# Patient Record
Sex: MALE | Race: WHITE | NOT HISPANIC OR LATINO | Employment: OTHER | URBAN - METROPOLITAN AREA
[De-identification: names, ages, dates, MRNs, and addresses within clinical notes are randomized per-mention and may not be internally consistent; named-entity substitution may affect disease eponyms.]

---

## 2019-09-09 ENCOUNTER — NURSING HOME VISIT (OUTPATIENT)
Dept: GERIATRICS | Facility: OTHER | Age: 84
End: 2019-09-09
Payer: MEDICARE

## 2019-09-09 DIAGNOSIS — G47.00 INSOMNIA, UNSPECIFIED TYPE: ICD-10-CM

## 2019-09-09 DIAGNOSIS — F41.9 ANXIETY: ICD-10-CM

## 2019-09-09 DIAGNOSIS — F01.50 VASCULAR DEMENTIA WITHOUT BEHAVIORAL DISTURBANCE (HCC): Primary | ICD-10-CM

## 2019-09-09 DIAGNOSIS — R26.2 AMBULATORY DYSFUNCTION: ICD-10-CM

## 2019-09-09 DIAGNOSIS — F32.89 OTHER DEPRESSION: ICD-10-CM

## 2019-09-09 PROBLEM — F03.90 DEMENTIA (HCC): Status: ACTIVE | Noted: 2019-09-09

## 2019-09-09 PROBLEM — F32.A DEPRESSION: Status: ACTIVE | Noted: 2019-09-09

## 2019-09-09 PROCEDURE — 99336 PR DOM/R-HOME E/M EST PT MOD HI SEVERITY 40 MINUTES: CPT | Performed by: NURSE PRACTITIONER

## 2019-09-09 NOTE — PROGRESS NOTES
[facility]  [address]  [phone]  [place]   POS: 31: SNF/Short Term Rehab        NAME: Vivienne Marquez  AGE: 80 y o  SEX: male    DATE OF ENCOUNTER: 9/9/2019    Assessment and Plan     Dementia  Continue supportive care, redirection, reorientation, assist with ADLs as needed, memantine  Depression  Stable  Continue Wellbutrin, BuSpar, citalopram     Anxiety  Continue Ativan  Ambulatory dysfunction  Fall precautions  Insomnia  Continue melatonin  Chief Complaint      Progress Note    History of Present Illness     Resident seen examined  Stable  Denies chest pain, shortness of breath, abdominal pain, nausea, vomiting, constipation, diarrhea, headache, dizziness, pain  He ambulates without assistance  Staff denies any recent falls  Staff denies any complaints  He is unable to provide history due to dementia  He participates in activities  Medications reviewed  Review of Systems     ROS as per noted in HPI  Objective     Vitals:  Blood pressure 111/62, pulse 65, respirations 18, temperature 97 7°  Physical Exam   Constitutional: He appears well-developed and well-nourished  Neck: Neck supple  Cardiovascular: Normal rate and normal heart sounds  Exam reveals no gallop and no friction rub  No murmur heard  Pulmonary/Chest: Effort normal and breath sounds normal  He has no wheezes  He has no rales  Abdominal: Soft  Bowel sounds are normal  There is no tenderness  There is no rebound and no guarding  Musculoskeletal: Normal range of motion  He exhibits no edema  Neurological: He is alert  He is oriented to self and to date  He needed a hint for name of place  Skin: Skin is warm and dry  Nursing note and vitals reviewed  Current Medications   Medications were reviewed and updated in facility chart       PREETI Mcconnell  9/9/2019 12:36 PM

## 2019-10-11 ENCOUNTER — TELEPHONE (OUTPATIENT)
Dept: OTHER | Facility: OTHER | Age: 84
End: 2019-10-11

## 2019-10-12 NOTE — TELEPHONE ENCOUNTER
Sent this Message to Dr Madie Horta via [address] [place] [address] Rd connect @ 8519    [phone]/ Diaz Gold from [address]/ Pt   Soledad Fry  / Medication needed, drop Shipment    Told Tiny to call back in 20-30 minutes if no call back from Provider

## 2019-11-10 ENCOUNTER — TELEPHONE (OUTPATIENT)
Dept: OTHER | Facility: OTHER | Age: 84
End: 2019-11-10

## 2019-11-14 ENCOUNTER — NURSING HOME VISIT (OUTPATIENT)
Dept: GERIATRICS | Facility: OTHER | Age: 84
End: 2019-11-14
Payer: MEDICARE

## 2019-11-14 DIAGNOSIS — F01.50 VASCULAR DEMENTIA WITHOUT BEHAVIORAL DISTURBANCE (HCC): Primary | ICD-10-CM

## 2019-11-14 DIAGNOSIS — F41.9 ANXIETY: ICD-10-CM

## 2019-11-14 DIAGNOSIS — F32.89 OTHER DEPRESSION: ICD-10-CM

## 2019-11-14 DIAGNOSIS — G47.00 INSOMNIA, UNSPECIFIED TYPE: ICD-10-CM

## 2019-11-14 DIAGNOSIS — R26.2 AMBULATORY DYSFUNCTION: ICD-10-CM

## 2019-11-14 PROCEDURE — 99336 PR DOM/R-HOME E/M EST PT MOD HI SEVERITY 40 MINUTES: CPT | Performed by: FAMILY MEDICINE

## 2019-11-15 NOTE — PROGRESS NOTES
[facility]  [address]  [phone]  [facility] SNF List: [facility]      NAME: Reji Mendosa  AGE: 80 y o  SEX: male [id number]    DATE OF ENCOUNTER: 11/18/2019    Assessment and Plan   1  Dementia, moderate to severe, vascular type: no behaviors noted     - redirection, reorientation, assistance with ADLs  - tolerating Memantine well     - cont Buspirone and Bupropion  2  Amb dysfunction: fall precautions  3  Depression: stable on Lexapro  4  Anxiety: stable on Lorazepam PRN and Buspirone  5  Insomnia: cont Melatonin     - sleep hygiene discussed with staff  Disposition: he is a long term resident at [address] [place] [address] dementia [facility]  - Counseling Documentation: patient was counseled regarding: prognosis    Chief Complaint     Resident seen during routine follow up visit  History of Present Illness     HPI   Resident seen and examined at bedside  Stable, he can't give much history due to dementia  He walks around mostly, does not use assistive devices  Staff has no complaints at this time  He has no falls or hospitalizations  He need assistance with ADLs, able to feed himself  He likes to participate in activities  He denies any pain  The following portions of the patient's history were reviewed and updated as appropriate: allergies, current medications, past family history, past medical history, past social history, past surgical history and problem list     Review of Systems     Review of Systems   Unable to perform ROS: Dementia   as in HPI  Active Problem List     Patient Active Problem List   Diagnosis    Dementia ([facility] [address] )    Depression    Anxiety    Ambulatory dysfunction    Insomnia       Objective     Vitals: T: 97 9, P: 76, R: 16, BP: 120/74, 94% on RA  Physical Exam   Constitutional: He appears well-developed and well-nourished  No distress  HENT:   Head: Normocephalic  Mouth/Throat: Oropharynx is clear and moist  No oropharyngeal exudate  Eyes: Conjunctivae and EOM are normal  Right eye exhibits no discharge  Left eye exhibits no discharge  No scleral icterus  Neck: Normal range of motion  Neck supple  Cardiovascular: Normal rate, regular rhythm and normal heart sounds  Exam reveals no gallop and no friction rub  No murmur heard  Pulmonary/Chest: Effort normal and breath sounds normal  No respiratory distress  He has no wheezes  He exhibits no tenderness  Abdominal: Soft  Bowel sounds are normal  He exhibits no distension  There is no tenderness  Musculoskeletal: Normal range of motion  He exhibits no edema, tenderness or deformity  Neurological: He is alert  No cranial nerve deficit or sensory deficit  Oriented to self  Skin: Skin is warm and dry  He is not diaphoretic  Psychiatric: He has a normal mood and affect  Confused, minimally verbal     Nursing note and vitals reviewed  Pertinent Laboratory/Diagnostic Studies:  He had CBC, BMP, TSH done on 10/11/19, within normal limits  Current Medications   Medications reviewed and updated in facility chart  DOS: 11/18/2019  Facility: [facility] SNF List: [facility]  BILLING CODE: [id number]  Nursing Home Place of Service: nursing home place of service: POS 32 Unskilled- No Part A Coverage  Diagnoses:   Diagnosis ICD-10-CM Associated Orders   1  Vascular dementia without behavioral disturbance (HCC) F01 50    2  Other depression F32 89    3  Insomnia, unspecified type G47 00    4  Anxiety F41 9    5   Ambulatory dysfunction R26 2

## 2020-01-09 ENCOUNTER — NURSING HOME VISIT (OUTPATIENT)
Dept: GERIATRICS | Facility: OTHER | Age: 85
End: 2020-01-09
Payer: MEDICARE

## 2020-01-09 DIAGNOSIS — F01.50 VASCULAR DEMENTIA WITHOUT BEHAVIORAL DISTURBANCE (HCC): Primary | ICD-10-CM

## 2020-01-09 DIAGNOSIS — F32.89 OTHER DEPRESSION: ICD-10-CM

## 2020-01-09 DIAGNOSIS — F41.9 ANXIETY: ICD-10-CM

## 2020-01-09 PROCEDURE — 99335 PR DOM/R-HOME E/M EST PT LW MOD SEVERITY 25 MINUTES: CPT | Performed by: PHYSICIAN ASSISTANT

## 2020-01-09 RX ORDER — BUPROPION HYDROCHLORIDE 100 MG/1
100 TABLET ORAL 2 TIMES DAILY
COMMUNITY
End: 2020-10-08 | Stop reason: SDUPTHER

## 2020-01-09 RX ORDER — LORAZEPAM 0.5 MG/1
0.5 TABLET ORAL 2 TIMES DAILY
COMMUNITY
End: 2020-03-27 | Stop reason: SDUPTHER

## 2020-01-09 RX ORDER — BUSPIRONE HYDROCHLORIDE 10 MG/1
10 TABLET ORAL 3 TIMES DAILY
COMMUNITY
End: 2020-10-08 | Stop reason: SDUPTHER

## 2020-01-09 RX ORDER — ESCITALOPRAM OXALATE 20 MG/1
20 TABLET ORAL DAILY
COMMUNITY
End: 2020-10-08 | Stop reason: SDUPTHER

## 2020-01-09 RX ORDER — MEMANTINE HYDROCHLORIDE 10 MG/1
10 TABLET ORAL 2 TIMES DAILY
COMMUNITY
End: 2020-07-23 | Stop reason: ALTCHOICE

## 2020-01-09 NOTE — PROGRESS NOTES
[facility]  [address]  [phone]  [facility]   Code 13        NAME: Linnette Mora  AGE: 80 y o  SEX: male    DATE OF ENCOUNTER: 1/9/2020    Assessment and Plan     Dementia  Continue redirection and reorientation  Continue to assist in ADLs at facility  Continue memantine 10mg PO BID  Depression  Continue buproprion 100mg PO QD  Continue lexapro 20mg PO QD  Anxiety  Continue lorazepam 0 5mg BID and 0 5mg Q24H prn for anxiety  All medications and routine orders were reviewed and updated as needed  Chief Complaint     long term Follow-up     History of Present Illness     MP is an 79 yo CM with multiple medical comorbidities including but not limited to vascular dementia, anxiety, and depression, interviewed and examined in collaboration with nursing for JARRETT follow-up  Unable to obtain history from pt due to dementia  No concerns from nursing  The patient's allergies, past medical, surgical, social and family history were reviewed and unchanged  Review of Systems     Review of Systems   Unable to perform ROS: Dementia         Objective     Vitals: 98 [id number]    Physical Exam   Constitutional: No distress  HENT:   Head: Normocephalic and atraumatic  Nose: Nose normal    Mouth/Throat: Oropharynx is clear and moist  No oropharyngeal exudate  Eyes: Pupils are equal, round, and reactive to light  Conjunctivae are normal  Right eye exhibits no discharge  Left eye exhibits no discharge  No scleral icterus  Cardiovascular: Normal rate and regular rhythm  Exam reveals no gallop and no friction rub  No murmur heard  Pulmonary/Chest: Effort normal and breath sounds normal  No stridor  No respiratory distress  He has no wheezes  He has no rales  Abdominal: Soft  Bowel sounds are normal  He exhibits no distension  There is no tenderness  There is no rebound and no guarding  Musculoskeletal: He exhibits no edema or tenderness     5/5 strength BUE and BLE    FROM BUE, BLE    Able to ambulate without assistive device  Neurological: He is alert  Confused, often referring to his being here to visit a friend's mother  Skin: He is not diaphoretic  Psychiatric:   Pleasant and cooperative during exam  Able to follow one step commands  Nursing note and vitals reviewed  Pertinent Laboratory/Diagnostic Studies:  Labs:  CMP, TSH, CBC 10/11/19 reviewed in facility chart     Current Medications   Medications reviewed and updated see facility chart for details        Current Outpatient Medications:     buPROPion (WELLBUTRIN) 100 mg tablet, Take 100 mg by mouth daily, Disp: , Rfl:     busPIRone (BUSPAR) 10 mg tablet, Take 10 mg by mouth 3 (three) times a day, Disp: , Rfl:     escitalopram (LEXAPRO) 20 mg tablet, Take 20 mg by mouth daily, Disp: , Rfl:     LORazepam (ATIVAN) 0 5 mg tablet, Take 0 5 mg by mouth 2 (two) times a day Take 1 tab PO BID AND 1 tab Q24H prn for anxiety, Disp: , Rfl:     memantine (NAMENDA) 10 mg tablet, Take 10 mg by mouth 2 (two) times a day, Disp: , Rfl:       Zack Olivera PA-C  1/9/2020 11:29 AM

## 2020-01-09 NOTE — ASSESSMENT & PLAN NOTE
Continue redirection and reorientation  Continue to assist in ADLs at facility  Continue memantine 10mg PO BID

## 2020-03-12 ENCOUNTER — NURSING HOME VISIT (OUTPATIENT)
Dept: GERIATRICS | Facility: OTHER | Age: 85
End: 2020-03-12
Payer: MEDICARE

## 2020-03-12 DIAGNOSIS — G47.00 INSOMNIA, UNSPECIFIED TYPE: ICD-10-CM

## 2020-03-12 DIAGNOSIS — F41.9 ANXIETY: ICD-10-CM

## 2020-03-12 DIAGNOSIS — F01.50 VASCULAR DEMENTIA WITHOUT BEHAVIORAL DISTURBANCE (HCC): Primary | ICD-10-CM

## 2020-03-12 DIAGNOSIS — R26.2 AMBULATORY DYSFUNCTION: ICD-10-CM

## 2020-03-12 DIAGNOSIS — F32.89 OTHER DEPRESSION: ICD-10-CM

## 2020-03-12 PROCEDURE — 99336 PR DOM/R-HOME E/M EST PT MOD HI SEVERITY 40 MINUTES: CPT | Performed by: FAMILY MEDICINE

## 2020-03-27 DIAGNOSIS — F41.9 ANXIETY: Primary | ICD-10-CM

## 2020-03-27 RX ORDER — LORAZEPAM 0.5 MG/1
0.5 TABLET ORAL 2 TIMES DAILY
Qty: 60 TABLET | Refills: 0 | Status: SHIPPED | OUTPATIENT
Start: 2020-03-27 | End: 2020-04-13 | Stop reason: SDUPTHER

## 2020-04-13 DIAGNOSIS — F41.9 ANXIETY: ICD-10-CM

## 2020-04-13 RX ORDER — LORAZEPAM 1 MG/1
1 TABLET ORAL 2 TIMES DAILY
Qty: 60 TABLET | Refills: 0 | Status: SHIPPED | OUTPATIENT
Start: 2020-04-13 | End: 2020-04-30 | Stop reason: ALTCHOICE

## 2020-04-30 DIAGNOSIS — F41.9 ANXIETY: Primary | ICD-10-CM

## 2020-04-30 RX ORDER — LORAZEPAM 0.5 MG/1
0.5 TABLET ORAL 2 TIMES DAILY
Qty: 60 TABLET | Refills: 0 | Status: SHIPPED | OUTPATIENT
Start: 2020-04-30 | End: 2020-06-29 | Stop reason: SDUPTHER

## 2020-05-21 ENCOUNTER — TELEMEDICINE (OUTPATIENT)
Dept: GERIATRICS | Facility: OTHER | Age: 85
End: 2020-05-21
Payer: MEDICARE

## 2020-05-21 DIAGNOSIS — R26.2 AMBULATORY DYSFUNCTION: ICD-10-CM

## 2020-05-21 DIAGNOSIS — F41.9 ANXIETY: ICD-10-CM

## 2020-05-21 DIAGNOSIS — G47.00 INSOMNIA, UNSPECIFIED TYPE: ICD-10-CM

## 2020-05-21 DIAGNOSIS — F32.89 OTHER DEPRESSION: ICD-10-CM

## 2020-05-21 DIAGNOSIS — F01.50 VASCULAR DEMENTIA WITHOUT BEHAVIORAL DISTURBANCE (HCC): Primary | ICD-10-CM

## 2020-05-21 PROCEDURE — 99442 PR PHYS/QHP TELEPHONE EVALUATION 11-20 MIN: CPT | Performed by: FAMILY MEDICINE

## 2020-06-29 DIAGNOSIS — F41.9 ANXIETY: ICD-10-CM

## 2020-06-29 RX ORDER — LORAZEPAM 0.5 MG/1
0.5 TABLET ORAL 2 TIMES DAILY
Qty: 60 TABLET | Refills: 0 | Status: SHIPPED | OUTPATIENT
Start: 2020-06-29 | End: 2020-10-29 | Stop reason: SDUPTHER

## 2020-07-23 ENCOUNTER — TELEMEDICINE (OUTPATIENT)
Dept: GERIATRICS | Facility: OTHER | Age: 85
End: 2020-07-23
Payer: MEDICARE

## 2020-07-23 VITALS
OXYGEN SATURATION: 97 % | SYSTOLIC BLOOD PRESSURE: 114 MMHG | TEMPERATURE: 98.9 F | RESPIRATION RATE: 20 BRPM | DIASTOLIC BLOOD PRESSURE: 71 MMHG | HEART RATE: 81 BPM

## 2020-07-23 DIAGNOSIS — F01.50 VASCULAR DEMENTIA WITHOUT BEHAVIORAL DISTURBANCE (HCC): Primary | ICD-10-CM

## 2020-07-23 DIAGNOSIS — F32.89 OTHER DEPRESSION: ICD-10-CM

## 2020-07-23 DIAGNOSIS — F41.9 ANXIETY: ICD-10-CM

## 2020-07-23 PROCEDURE — 99446 NTRPROF PH1/NTRNET/EHR 5-10: CPT | Performed by: PHYSICIAN ASSISTANT

## 2020-07-23 NOTE — PROGRESS NOTES
Virtual Regular Visit      Assessment/Plan:    Problem List Items Addressed This Visit        Nervous and Auditory    Dementia ([facility] [address] ) - Primary     - Continue redirection and reorientation   - continue to participate in engaging, group activities  - Continue to assist with ADLs at facility   - Continue fall precautions              Other    Depression     - Continue Wellbutrin 100mg PO BID  - Continue lexapro 20 mg PO QD  - Denies SI/HI         Anxiety     - Continue lorazepam 0 5 mg PO BID   - Continue Buspar 10 mg PO TID                     Reason for visit is   Chief Complaint   Patient presents with    Virtual Regular Visit        Encounter provider Jr Murphy PA-C    Provider located at [address] [place] [address] [address] [facility]   [phone] [address]   Code 13     Recent Visits  No visits were found meeting these conditions  Showing recent visits within past 7 days and meeting all other requirements     Today's Visits  Date Type Provider Dept   07/23/20 Telemedicine Jr Murphy PA-C  Nursing Home Vir   Showing today's visits and meeting all other requirements     Future Appointments  No visits were found meeting these conditions  Showing future appointments within next 150 days and meeting all other requirements        The patient was identified by name and date of birth  Lia Shay and nurse were informed that this is a telemedicine visit and that the visit is being conducted through [address] [place] [address] and patient was informed that this is not a secure, HIPAA-complaint platform  They agrees to proceed     My office door was closed  No one else was in the room  They acknowledged consent and understanding of privacy and security of the video platform  The patient has agreed to participate and understands they can discontinue the visit at any time  Patient is aware this is a billable service  Subjective  Lia Shay is a 80 y o  male with multiple medical comorbidities including but not limited to vascular dementia, depression, and anxiety, interviewed and examined in collaboration with nursing for care home follow-up  He is unable to provide history due to dementia  Nursing without concerns at this time  He has had no recent falls  No recent hospitalizations  Past Medical History:   Diagnosis Date    Insomnia 9/9/2019       History reviewed  No pertinent surgical history  Current Outpatient Medications   Medication Sig Dispense Refill    buPROPion (WELLBUTRIN) 100 mg tablet Take 100 mg by mouth 2 (two) times a day      busPIRone (BUSPAR) 10 mg tablet Take 10 mg by mouth 3 (three) times a day      escitalopram (LEXAPRO) 20 mg tablet Take 20 mg by mouth daily      LORazepam (ATIVAN) 0 5 mg tablet Take 1 tablet (0 5 mg total) by mouth 2 (two) times a day 60 tablet 0     No current facility-administered medications for this visit  Allergies   Allergen Reactions    Bee Venom        Review of Systems   Unable to perform ROS: Dementia       Video Exam    Vitals:    07/23/20 1602   BP: 114/71   Pulse: 81   Resp: 20   Temp: 98 9 °F (37 2 °C)   SpO2: 97%       Physical Exam   Constitutional: No distress  Chronically ill appearing elderly male lying comfortably in bed    HENT:   Head: Normocephalic and atraumatic  Pulmonary/Chest: Effort normal    Neurological: He is alert  Oriented to self only    Skin: He is not diaphoretic  Psychiatric:   Pleasant, unable to fully cooperate with exam due to dementia   Nursing note and vitals reviewed  I spent 10 minutes directly with the patient during this visit      VIRTUAL VISIT DISCLAIMER    Patience Kim and nurse acknowledge that he has consented to an online visit or consultation   They understands that the online visit is based solely on information provided by him, and that, in the absence of a face-to-face physical evaluation by the physician, the diagnosis he receives is both limited and provisional in terms of accuracy and completeness  This is not intended to replace a full medical face-to-face evaluation by the physician  Cody Webber and nurse understand and accept these terms

## 2020-07-23 NOTE — ASSESSMENT & PLAN NOTE
- Continue redirection and reorientation   - continue to participate in engaging, group activities  - Continue to assist with ADLs at facility   - Continue fall precautions

## 2020-09-17 ENCOUNTER — NURSING HOME VISIT (OUTPATIENT)
Dept: GERIATRICS | Facility: OTHER | Age: 85
End: 2020-09-17
Payer: MEDICARE

## 2020-09-17 DIAGNOSIS — F41.9 ANXIETY: ICD-10-CM

## 2020-09-17 DIAGNOSIS — G47.00 INSOMNIA, UNSPECIFIED TYPE: ICD-10-CM

## 2020-09-17 DIAGNOSIS — F01.50 VASCULAR DEMENTIA WITHOUT BEHAVIORAL DISTURBANCE (HCC): Primary | ICD-10-CM

## 2020-09-17 DIAGNOSIS — F32.89 OTHER DEPRESSION: ICD-10-CM

## 2020-09-17 DIAGNOSIS — R26.2 AMBULATORY DYSFUNCTION: ICD-10-CM

## 2020-09-17 PROCEDURE — 99336 PR DOM/R-HOME E/M EST PT MOD HI SEVERITY 40 MINUTES: CPT | Performed by: FAMILY MEDICINE

## 2020-09-17 NOTE — PROGRESS NOTES
[facility]  [address]  [phone]  [facility]  Long term      NAME: Joseph Leon  AGE: 80 y o  SEX: male [id number]    DATE OF ENCOUNTER: 9/18/2020    Assessment and Plan     1  Vascular dementia without behavioral disturbance (HCC)     - redirection, reorientation     - assistance with ADLs    2  Other depression     - stable     - cont Lexapro 20 mg po qd     - cont Bupropion 100 mg po bid    3  Insomnia, unspecified type     - improving     - cont Melatonin 5 mg po qhs    4  Anxiety     - stable     - cont Buspar 10 mg po TID     - cont Lorazepam 0 5 mg po bid    5  Ambulatory dysfunction/fall      - Right rib x ray ordered     - start Tylenol 975 mg po TIDx 5 days , then PRN     - start Aspercreme topical to right ribs BID      - Counseling Documentation: patient was counseled regarding: prognosis    Chief Complaint     Routine long term follow up visit  History of Present Illness     Joseph Leon, a 81 y/o male seen and evaluated during routine follow up visit  He fell yesterday, c/o right sided lower rib pain, shooting pain, worsens with movement and breathing  He walks independent, no assistive devices used  Staff has no concerns at this time  He needs remainders and minimal assistance with ADLs  The following portions of the patient's history were reviewed and updated as appropriate: allergies, current medications, past family history, past medical history, past social history, past surgical history and problem list     Review of Systems     Review of Systems   Constitutional: Positive for activity change and fatigue  HENT: Positive for hearing loss  Negative for trouble swallowing  Eyes: Negative  Respiratory: Negative  Cardiovascular: Negative  Gastrointestinal: Negative  Genitourinary: Negative  Musculoskeletal: Positive for arthralgias, gait problem and myalgias  Neurological: Positive for weakness     Psychiatric/Behavioral: Negative  All other systems reviewed and are negative  As in HPI  Active Problem List     Patient Active Problem List   Diagnosis    Dementia ([facility] [address] )    Depression    Anxiety    Ambulatory dysfunction    Insomnia       Objective     Vitals: T; 97 8, P: 70, R: 16, BP: 142/84, 94% on RA, Wt: 154 2 lbs    Physical Exam  Vitals signs and nursing note reviewed  Constitutional:       General: He is not in acute distress  Appearance: Normal appearance  He is well-developed  He is not diaphoretic  HENT:      Head: Normocephalic and atraumatic  Nose: Nose normal       Mouth/Throat:      Mouth: Mucous membranes are moist       Pharynx: Oropharynx is clear  No oropharyngeal exudate  Eyes:      General: No scleral icterus  Right eye: No discharge  Left eye: No discharge  Extraocular Movements: Extraocular movements intact  Conjunctiva/sclera: Conjunctivae normal    Neck:      Musculoskeletal: Normal range of motion and neck supple  Cardiovascular:      Rate and Rhythm: Normal rate and regular rhythm  Heart sounds: Normal heart sounds  No murmur  No friction rub  No gallop  Pulmonary:      Effort: Pulmonary effort is normal  No respiratory distress  Breath sounds: Normal breath sounds  No wheezing  Chest:      Chest wall: No tenderness  Abdominal:      General: Bowel sounds are normal  There is no distension  Palpations: Abdomen is soft  Tenderness: There is no abdominal tenderness  There is no guarding  Musculoskeletal: Normal range of motion  General: Tenderness present  No deformity  Right lower leg: No edema  Left lower leg: No edema  Comments: Tenderness over right lower rib cage  Skin:     General: Skin is warm and dry  Neurological:      General: No focal deficit present  Mental Status: He is alert and oriented to person, place, and time  Mental status is at baseline        Cranial Nerves: No cranial nerve deficit  Motor: No abnormal muscle tone  Coordination: Coordination normal    Psychiatric:         Mood and Affect: Mood normal          Behavior: Behavior normal          Pertinent Laboratory/Diagnostic Studies:  Refer to facility chart  Current Medications   Medications reviewed and updated in facility chart

## 2020-09-18 ENCOUNTER — TELEPHONE (OUTPATIENT)
Dept: OTHER | Facility: OTHER | Age: 85
End: 2020-09-18

## 2020-09-18 NOTE — TELEPHONE ENCOUNTER
[facility] Pt: Luis Enrique Ruffin : 10/8/1930 Msg: Nasreen Timi results they want to go over     On Call Provider Paged

## 2020-10-08 DIAGNOSIS — F32.89 OTHER DEPRESSION: ICD-10-CM

## 2020-10-08 DIAGNOSIS — F41.9 ANXIETY: Primary | ICD-10-CM

## 2020-10-08 RX ORDER — BUSPIRONE HYDROCHLORIDE 10 MG/1
10 TABLET ORAL 3 TIMES DAILY
Qty: 270 TABLET | Refills: 0 | Status: SHIPPED | OUTPATIENT
Start: 2020-10-08 | End: 2020-12-23 | Stop reason: SDUPTHER

## 2020-10-08 RX ORDER — ESCITALOPRAM OXALATE 20 MG/1
20 TABLET ORAL DAILY
Qty: 90 TABLET | Refills: 0 | Status: SHIPPED | OUTPATIENT
Start: 2020-10-08 | End: 2020-12-23 | Stop reason: SDUPTHER

## 2020-10-08 RX ORDER — BUPROPION HYDROCHLORIDE 100 MG/1
100 TABLET ORAL 2 TIMES DAILY
Qty: 180 TABLET | Refills: 0 | Status: SHIPPED | OUTPATIENT
Start: 2020-10-08 | End: 2020-12-23 | Stop reason: SDUPTHER

## 2020-10-29 ENCOUNTER — TELEMEDICINE (OUTPATIENT)
Dept: GERIATRICS | Facility: OTHER | Age: 85
End: 2020-10-29
Payer: MEDICARE

## 2020-10-29 VITALS
TEMPERATURE: 97.4 F | RESPIRATION RATE: 20 BRPM | OXYGEN SATURATION: 99 % | DIASTOLIC BLOOD PRESSURE: 74 MMHG | SYSTOLIC BLOOD PRESSURE: 127 MMHG | WEIGHT: 154 LBS | HEART RATE: 76 BPM

## 2020-10-29 DIAGNOSIS — F41.9 ANXIETY: ICD-10-CM

## 2020-10-29 DIAGNOSIS — F01.50 VASCULAR DEMENTIA WITHOUT BEHAVIORAL DISTURBANCE (HCC): Primary | ICD-10-CM

## 2020-10-29 DIAGNOSIS — G47.00 INSOMNIA, UNSPECIFIED TYPE: ICD-10-CM

## 2020-10-29 DIAGNOSIS — F32.89 OTHER DEPRESSION: ICD-10-CM

## 2020-10-29 PROCEDURE — 99335 PR DOM/R-HOME E/M EST PT LW MOD SEVERITY 25 MINUTES: CPT | Performed by: PHYSICIAN ASSISTANT

## 2020-10-29 RX ORDER — LORAZEPAM 0.5 MG/1
0.5 TABLET ORAL 2 TIMES DAILY
Qty: 60 TABLET | Refills: 0 | Status: SHIPPED | OUTPATIENT
Start: 2020-10-29 | End: 2020-10-30 | Stop reason: SDUPTHER

## 2020-10-30 DIAGNOSIS — F41.9 ANXIETY: ICD-10-CM

## 2020-10-30 RX ORDER — LORAZEPAM 0.5 MG/1
0.5 TABLET ORAL 2 TIMES DAILY
Qty: 10 TABLET | Refills: 0 | Status: SHIPPED | OUTPATIENT
Start: 2020-10-30 | End: 2020-12-02 | Stop reason: SDUPTHER

## 2020-12-02 DIAGNOSIS — F41.9 ANXIETY: ICD-10-CM

## 2020-12-02 RX ORDER — LORAZEPAM 0.5 MG/1
0.5 TABLET ORAL 2 TIMES DAILY
Qty: 60 TABLET | Refills: 0 | Status: SHIPPED | OUTPATIENT
Start: 2020-12-02 | End: 2020-12-03 | Stop reason: SDUPTHER

## 2020-12-03 DIAGNOSIS — F41.9 ANXIETY: ICD-10-CM

## 2020-12-03 RX ORDER — LORAZEPAM 0.5 MG/1
0.5 TABLET ORAL 2 TIMES DAILY
Qty: 12 TABLET | Refills: 0 | Status: SHIPPED | OUTPATIENT
Start: 2020-12-03 | End: 2021-02-03 | Stop reason: SDUPTHER

## 2020-12-23 ENCOUNTER — TELEMEDICINE (OUTPATIENT)
Dept: GERIATRICS | Facility: OTHER | Age: 85
End: 2020-12-23
Payer: MEDICARE

## 2020-12-23 DIAGNOSIS — F32.89 OTHER DEPRESSION: ICD-10-CM

## 2020-12-23 DIAGNOSIS — F41.9 ANXIETY: ICD-10-CM

## 2020-12-23 DIAGNOSIS — F01.50 VASCULAR DEMENTIA WITHOUT BEHAVIORAL DISTURBANCE (HCC): Primary | ICD-10-CM

## 2020-12-23 PROCEDURE — 99336 PR DOM/R-HOME E/M EST PT MOD HI SEVERITY 40 MINUTES: CPT | Performed by: FAMILY MEDICINE

## 2020-12-23 RX ORDER — ESCITALOPRAM OXALATE 20 MG/1
20 TABLET ORAL DAILY
Qty: 90 TABLET | Refills: 0 | Status: SHIPPED | OUTPATIENT
Start: 2020-12-23 | End: 2021-07-29 | Stop reason: ALTCHOICE

## 2020-12-23 RX ORDER — BUPROPION HYDROCHLORIDE 100 MG/1
100 TABLET ORAL 2 TIMES DAILY
Qty: 180 TABLET | Refills: 0 | Status: SHIPPED | OUTPATIENT
Start: 2020-12-23 | End: 2020-12-24 | Stop reason: SDUPTHER

## 2020-12-23 RX ORDER — BUSPIRONE HYDROCHLORIDE 10 MG/1
10 TABLET ORAL 3 TIMES DAILY
Qty: 270 TABLET | Refills: 0 | Status: SHIPPED | OUTPATIENT
Start: 2020-12-23 | End: 2022-03-17

## 2020-12-23 NOTE — PROGRESS NOTES
Virtual Regular Visit  [facility]  [facility]    Assessment/Plan:    1  Dementia, mild to moderate, likely Alzheimer's dx:     - assistance with ADLs     - Fall precautions    2  Depression/Anxiety:     - stable     - cont Buspar 10 mg po tid     - cont Bupropion 100 mg po bid     - decrease Lexapro to 10 mg po qd     - change Lorazepam 0 5 mg as needed  Problem List Items Addressed This Visit     None           Reason for visit is routine long term follow up visit  Chief Complaint   Patient presents with    Virtual Regular Visit        Encounter provider Asim Arellano MD    Provider located at [address] [place] [address] VIRTUAL  [address] [place] [address] RD  [address] [facility]  [phone]      Recent Visits  No visits were found meeting these conditions  Showing recent visits within past 7 days and meeting all other requirements     Today's Visits  Date Type Provider Dept   12/23/20 Telemedicine Asim Arellano MD  Nursing Home [facility]   Showing today's visits and meeting all other requirements     Future Appointments  No visits were found meeting these conditions  Showing future appointments within next 150 days and meeting all other requirements        The patient was identified by name and date of birth  Bay Diane and staff was informed that this is a telemedicine visit and that the visit is being conducted through [address] [place] [address] and patient was informed that this is not a secure, HIPAA-compliant platform  He agrees to proceed     My office door was closed  No one else was in the room  He acknowledged consent and understanding of privacy and security of the video platform  The patient has agreed to participate and understands they can discontinue the visit at any time  Patient is aware this is a billable service  Subjective/HPI  Bay Diane is a 80 y o  male is a long term resident at [facility] [employer]  Says "I'm doing okay"  He is independent of most of the ADLs   He doesnot use any assistive devices  He is eating and sleeping well  He is aware that this is holiday season  He is wishing "Claudia Harsh [place] and Happy New Year"  He denies any pain  He had no recent falls or hospitalizations  Staff have no concerns at this time  Past Medical History:   Diagnosis Date    Insomnia 9/9/2019       No past surgical history on file  Current Outpatient Medications   Medication Sig Dispense Refill    buPROPion (WELLBUTRIN) 100 mg tablet Take 1 tablet (100 mg total) by mouth 2 (two) times a day 180 tablet 0    busPIRone (BUSPAR) 10 mg tablet Take 1 tablet (10 mg total) by mouth 3 (three) times a day 270 tablet 0    escitalopram (LEXAPRO) 20 mg tablet Take 1 tablet (20 mg total) by mouth daily 90 tablet 0    LORazepam (ATIVAN) 0 5 mg tablet Take 1 tablet (0 5 mg total) by mouth 2 (two) times a day 12 tablet 0     No current facility-administered medications for this visit  Allergies   Allergen Reactions    Bee Venom        Review of Systems   Constitutional: Negative for activity change and fatigue  HENT: Negative for dental problem, hearing loss and trouble swallowing  Eyes: Negative  Respiratory: Negative  Cardiovascular: Negative  Gastrointestinal: Negative  Genitourinary: Negative  Musculoskeletal: Negative for arthralgias and gait problem  Neurological: Negative for weakness  Psychiatric/Behavioral: Negative  All other systems reviewed and are negative  As in HPI  Video Exam    Vitals: T; 98 9, P: 91, R: 16, BP: 112/70, 94% on RA    Physical Exam  Vitals signs and nursing note reviewed  Constitutional:       General: He is not in acute distress  Appearance: He is well-developed  He is not diaphoretic  HENT:      Head: Normocephalic and atraumatic  Nose: Nose normal       Mouth/Throat:      Mouth: Mucous membranes are moist       Pharynx: Oropharynx is clear  No oropharyngeal exudate     Eyes:      General: No scleral icterus  Right eye: No discharge  Left eye: No discharge  Extraocular Movements: Extraocular movements intact  Conjunctiva/sclera: Conjunctivae normal    Neck:      Musculoskeletal: Normal range of motion  Musculoskeletal: Normal range of motion  General: No tenderness or deformity  Right lower leg: No edema  Left lower leg: No edema  Skin:     General: Skin is warm and dry  Neurological:      Mental Status: He is alert and oriented to person, place, and time  Mental status is at baseline  Psychiatric:         Mood and Affect: Mood normal          Behavior: Behavior normal           I spent >30 minutes with patient today in which greater than 50% of the time was spent in counseling/coordination of care regarding fall preventions  VIRTUAL VISIT DISCLAIMER    Bonnie Rehman acknowledges that he has consented to an online visit or consultation  He understands that the online visit is based solely on information provided by him, and that, in the absence of a face-to-face physical evaluation by the physician, the diagnosis he receives is both limited and provisional in terms of accuracy and completeness  This is not intended to replace a full medical face-to-face evaluation by the physician  Bonnie Rehman understands and accepts these terms

## 2020-12-24 DIAGNOSIS — F32.89 OTHER DEPRESSION: ICD-10-CM

## 2020-12-28 RX ORDER — BUPROPION HYDROCHLORIDE 100 MG/1
100 TABLET ORAL 2 TIMES DAILY
Qty: 180 TABLET | Refills: 1 | Status: SHIPPED | OUTPATIENT
Start: 2020-12-28 | End: 2021-01-11 | Stop reason: SDUPTHER

## 2020-12-29 ENCOUNTER — TELEPHONE (OUTPATIENT)
Dept: GERIATRICS | Age: 85
End: 2020-12-29

## 2021-01-11 DIAGNOSIS — F32.89 OTHER DEPRESSION: ICD-10-CM

## 2021-01-11 RX ORDER — BUPROPION HYDROCHLORIDE 100 MG/1
100 TABLET ORAL 2 TIMES DAILY
Qty: 180 TABLET | Refills: 1 | Status: SHIPPED | OUTPATIENT
Start: 2021-01-11 | End: 2021-01-12 | Stop reason: ALTCHOICE

## 2021-01-12 RX ORDER — BUPROPION HYDROCHLORIDE 100 MG/1
100 TABLET, EXTENDED RELEASE ORAL 2 TIMES DAILY
Qty: 180 TABLET | Refills: 0 | Status: SHIPPED | OUTPATIENT
Start: 2021-01-12

## 2021-01-13 RX ORDER — ACETAMINOPHEN 325 MG/1
975 TABLET ORAL EVERY 8 HOURS PRN
COMMUNITY

## 2021-01-13 RX ORDER — LIDOCAINE 4 G/G
1 PATCH TOPICAL DAILY
COMMUNITY
End: 2022-02-16 | Stop reason: DRUGHIGH

## 2021-01-13 RX ORDER — EPINEPHRINE 0.15 MG/.3ML
0.15 INJECTION INTRAMUSCULAR ONCE
COMMUNITY

## 2021-01-13 RX ORDER — CHOLECALCIFEROL (VITAMIN D3) 125 MCG
CAPSULE ORAL
COMMUNITY
End: 2021-07-29 | Stop reason: ALTCHOICE

## 2021-02-03 DIAGNOSIS — F41.9 ANXIETY: ICD-10-CM

## 2021-02-03 RX ORDER — LORAZEPAM 0.5 MG/1
0.5 TABLET ORAL 2 TIMES DAILY
Qty: 60 TABLET | Refills: 0 | Status: SHIPPED | OUTPATIENT
Start: 2021-02-03 | End: 2021-03-03 | Stop reason: SDUPTHER

## 2021-02-11 ENCOUNTER — NURSING HOME VISIT (OUTPATIENT)
Dept: GERIATRICS | Facility: OTHER | Age: 86
End: 2021-02-11
Payer: MEDICARE

## 2021-02-11 DIAGNOSIS — F01.50 VASCULAR DEMENTIA WITHOUT BEHAVIORAL DISTURBANCE (HCC): Primary | ICD-10-CM

## 2021-02-11 DIAGNOSIS — M19.011 PRIMARY OSTEOARTHRITIS OF BOTH SHOULDERS: ICD-10-CM

## 2021-02-11 DIAGNOSIS — F41.9 ANXIETY: ICD-10-CM

## 2021-02-11 DIAGNOSIS — G47.00 INSOMNIA, UNSPECIFIED TYPE: ICD-10-CM

## 2021-02-11 DIAGNOSIS — M19.012 PRIMARY OSTEOARTHRITIS OF BOTH SHOULDERS: ICD-10-CM

## 2021-02-11 DIAGNOSIS — F32.89 OTHER DEPRESSION: ICD-10-CM

## 2021-02-11 PROCEDURE — 99336 PR DOM/R-HOME E/M EST PT MOD HI SEVERITY 40 MINUTES: CPT | Performed by: FAMILY MEDICINE

## 2021-02-11 NOTE — PROGRESS NOTES
[address] [facility]  [address]  [address]  [phone]  [address]  [address]      NAME: Brody Perez  AGE: 80 y o  SEX: male [id number]    DATE OF ENCOUNTER: 2/11/2021    Assessment and Plan     1  Vascular dementia without behavioral disturbance (HCC)     -  Mild to moderate degree     -  Redirection reorientation as needed     -  Assistance and supervision with ADLs     -  Fall precautions     -  Monitor monthly weights    2  Anxiety     -  Stable     -  Continue lorazepam mg twice a day, attempt to wean off     -  Continue buspirone 10 mg p o  3 times a day    3  Other depression     -  Stable     -  Continue bupropion 100 mg twice a day     -  Continue escitalopram 20 mg p o  daily    4  Insomnia, unspecified type     -  Sleep hygiene discussed with staff     -  Continue melatonin 5 mg at bedtime    5  Primary osteoarthritis of both shoulders     -  ROM exercises recommended     -  Continue Tylenol as needed      - Counseling Documentation: patient was counseled regarding: prognosis    Chief Complaint     Routine long term follow up visit  History of Present Illness     HPI  Brody Perez, a 81 y/o male seen during routine follow up visit, stable  He is able to give some history  He does not have any complaints this time except for chronic shoulder pain  He does not use any assistive devices  He needs mod assistance with ADLs  staff have no concerns at this time  Denies any falls or recent hospitalizations  The following portions of the patient's history were reviewed and updated as appropriate: allergies, current medications, past family history, past medical history, past social history, past surgical history and problem list     Review of Systems     Review of Systems   Constitutional: Positive for activity change and fatigue  HENT: Positive for hearing loss  Negative for dental problem and trouble swallowing  Eyes: Negative  Respiratory: Negative      Cardiovascular: Negative  Gastrointestinal: Negative  Genitourinary: Negative  Musculoskeletal: Positive for arthralgias and gait problem  Neurological: Negative for weakness  Psychiatric/Behavioral: Negative  All other systems reviewed and are negative  As in HPI  Active Problem List     Patient Active Problem List   Diagnosis    Dementia ([facility] [address] )    Depression    Anxiety    Ambulatory dysfunction    Insomnia       Objective     Vitals: T: 97 4, P; 74, R: 16, BP: 110/68, 96% on RA    Physical Exam  Vitals signs and nursing note reviewed  Constitutional:       General: He is not in acute distress  Appearance: Normal appearance  He is well-developed  He is not diaphoretic  HENT:      Head: Normocephalic and atraumatic  Nose: Nose normal       Mouth/Throat:      Mouth: Mucous membranes are moist       Pharynx: Oropharynx is clear  No oropharyngeal exudate  Eyes:      General: No scleral icterus  Right eye: No discharge  Left eye: No discharge  Extraocular Movements: Extraocular movements intact  Conjunctiva/sclera: Conjunctivae normal       Pupils: Pupils are equal, round, and reactive to light  Comments: Wears glasses  Neck:      Musculoskeletal: Normal range of motion and neck supple  Cardiovascular:      Rate and Rhythm: Normal rate and regular rhythm  Heart sounds: Normal heart sounds  No murmur  No friction rub  No gallop  Pulmonary:      Effort: Pulmonary effort is normal  No respiratory distress  Breath sounds: Normal breath sounds  No wheezing  Chest:      Chest wall: No tenderness  Abdominal:      General: Bowel sounds are normal  There is no distension  Palpations: Abdomen is soft  Tenderness: There is no abdominal tenderness  There is no guarding  Musculoskeletal: Normal range of motion  General: No tenderness or deformity  Right lower leg: No edema  Left lower leg: No edema     Skin:     General: Skin is warm and dry  Neurological:      General: No focal deficit present  Mental Status: He is alert  Mental status is at baseline  Cranial Nerves: No cranial nerve deficit  Motor: No abnormal muscle tone  Coordination: Coordination normal       Comments: Oriented to person and place  Verbal, able to give good history  Psychiatric:         Mood and Affect: Mood normal          Behavior: Behavior normal          Pertinent Laboratory/Diagnostic Studies:  Refer to facility chart  Current Medications   Medications reviewed and updated in facility chart

## 2021-02-23 PROBLEM — M19.011 PRIMARY OSTEOARTHRITIS OF BOTH SHOULDERS: Status: ACTIVE | Noted: 2021-02-23

## 2021-02-23 PROBLEM — M19.012 PRIMARY OSTEOARTHRITIS OF BOTH SHOULDERS: Status: ACTIVE | Noted: 2021-02-23

## 2021-03-03 DIAGNOSIS — F41.9 ANXIETY: ICD-10-CM

## 2021-03-03 RX ORDER — LORAZEPAM 0.5 MG/1
0.5 TABLET ORAL 2 TIMES DAILY
Qty: 60 TABLET | Refills: 0 | Status: SHIPPED | OUTPATIENT
Start: 2021-03-03 | End: 2021-04-01 | Stop reason: SDUPTHER

## 2021-04-01 DIAGNOSIS — F41.9 ANXIETY: ICD-10-CM

## 2021-04-01 RX ORDER — LORAZEPAM 0.5 MG/1
0.5 TABLET ORAL 2 TIMES DAILY
Qty: 60 TABLET | Refills: 0 | Status: SHIPPED | OUTPATIENT
Start: 2021-04-01 | End: 2021-04-19

## 2021-04-04 ENCOUNTER — TELEPHONE (OUTPATIENT)
Dept: OTHER | Facility: OTHER | Age: 86
End: 2021-04-04

## 2021-04-15 DIAGNOSIS — F32.89 OTHER DEPRESSION: ICD-10-CM

## 2021-04-15 DIAGNOSIS — F41.9 ANXIETY: ICD-10-CM

## 2021-04-19 DIAGNOSIS — F41.9 ANXIETY: ICD-10-CM

## 2021-04-19 RX ORDER — ESCITALOPRAM OXALATE 20 MG/1
20 TABLET ORAL DAILY
Qty: 90 TABLET | Refills: 3 | OUTPATIENT
Start: 2021-04-19

## 2021-04-19 RX ORDER — BUSPIRONE HYDROCHLORIDE 10 MG/1
TABLET ORAL
Qty: 270 TABLET | Refills: 0 | OUTPATIENT
Start: 2021-04-19

## 2021-04-19 RX ORDER — BUSPIRONE HYDROCHLORIDE 10 MG/1
10 TABLET ORAL 3 TIMES DAILY
Qty: 270 TABLET | Refills: 0 | OUTPATIENT
Start: 2021-04-19

## 2021-04-19 RX ORDER — LORAZEPAM 0.5 MG/1
TABLET ORAL
Qty: 60 TABLET | Refills: 3 | Status: SHIPPED | OUTPATIENT
Start: 2021-04-19 | End: 2021-09-08 | Stop reason: SDUPTHER

## 2021-06-03 ENCOUNTER — NURSING HOME VISIT (OUTPATIENT)
Dept: GERIATRICS | Facility: OTHER | Age: 86
End: 2021-06-03
Payer: MEDICARE

## 2021-06-03 DIAGNOSIS — M19.011 PRIMARY OSTEOARTHRITIS OF BOTH SHOULDERS: ICD-10-CM

## 2021-06-03 DIAGNOSIS — F41.9 ANXIETY: ICD-10-CM

## 2021-06-03 DIAGNOSIS — F01.50 VASCULAR DEMENTIA WITHOUT BEHAVIORAL DISTURBANCE (HCC): Primary | ICD-10-CM

## 2021-06-03 DIAGNOSIS — M19.012 PRIMARY OSTEOARTHRITIS OF BOTH SHOULDERS: ICD-10-CM

## 2021-06-03 DIAGNOSIS — F32.89 OTHER DEPRESSION: ICD-10-CM

## 2021-06-03 PROCEDURE — 99336 PR DOM/R-HOME E/M EST PT MOD HI SEVERITY 40 MINUTES: CPT | Performed by: FAMILY MEDICINE

## 2021-06-03 NOTE — PROGRESS NOTES
[facility]  [address]  [phone]  [address]  [address]      NAME: Joy Gallardo  AGE: 80 y o  SEX: male [phone]    DATE OF ENCOUNTER: 6/3/2021    Assessment and Plan     1  Vascular dementia without behavioral disturbance ([facility] [address] )     -  Redirection, reorientation     -  Assistance and reminders with ADLs     -  Fall precautions in place     -  Monitor weights    2  Other depression     -  Stable     -  Continue escitalopram 10 mg p o  daily     -  Continue bupropion 100 mg p o  b i d     3  Primary osteoarthritis of both shoulders     -  Stable     -  Continue Tylenol as needed    4  Anxiety    -  Stable     -  Continue buspirone 10 mg p o  t i d      -  Continue lorazepam 0 5 mg p o  b i d       - Counseling Documentation: patient was counseled regarding: prognosis    Chief Complaint      routine long-term follow-up visit  History of Present Illness     HPI  Joy Gallardo, a 79 y/o male a long-term resident at [facility], seen and examined, stable  He is pleasant and cooperative  He found walking in hallways, no assistive devices used  No falls or recent hospitalizations  He is eating and sleeping well  He denies any pain  staff have no concerns at this time  The following portions of the patient's history were reviewed and updated as appropriate: allergies, current medications, past family history, past medical history, past social history, past surgical history and problem list     Review of Systems     Review of Systems   Unable to perform ROS: Dementia   HENT: Negative for dental problem and trouble swallowing  Eyes: Negative for visual disturbance  Respiratory: Negative for cough, chest tightness and shortness of breath  Cardiovascular: Negative for chest pain, palpitations and leg swelling  Gastrointestinal: Negative for abdominal pain, constipation, diarrhea, nausea and vomiting  Musculoskeletal: Negative for arthralgias and gait problem  Neurological: Negative for weakness  As in HPI  Active Problem List     Patient Active Problem List   Diagnosis    Dementia ([facility] [address] )    Depression    Anxiety    Ambulatory dysfunction    Insomnia    Primary osteoarthritis of both shoulders       Objective     Vitals: T: 97 4, P: 79, R: 16, BP: 109/60, 94% on RA    Physical Exam  Vitals and nursing note reviewed  Constitutional:       General: He is not in acute distress  Appearance: Normal appearance  He is well-developed  He is not diaphoretic  HENT:      Head: Normocephalic and atraumatic  Nose: Nose normal       Mouth/Throat:      Mouth: Mucous membranes are moist       Pharynx: Oropharynx is clear  No oropharyngeal exudate  Eyes:      General: No scleral icterus  Right eye: No discharge  Left eye: No discharge  Extraocular Movements: Extraocular movements intact  Conjunctiva/sclera: Conjunctivae normal    Cardiovascular:      Rate and Rhythm: Normal rate and regular rhythm  Heart sounds: Normal heart sounds  No murmur heard  No friction rub  No gallop  Pulmonary:      Effort: Pulmonary effort is normal  No respiratory distress  Breath sounds: Normal breath sounds  No wheezing  Chest:      Chest wall: No tenderness  Abdominal:      General: Bowel sounds are normal  There is no distension  Palpations: Abdomen is soft  Tenderness: There is no abdominal tenderness  There is no guarding  Musculoskeletal:         General: No tenderness or deformity  Normal range of motion  Cervical back: Normal range of motion and neck supple  Right lower leg: No edema  Left lower leg: No edema  Comments:  Does not use any assistive devices  Skin:     General: Skin is warm and dry  Neurological:      Mental Status: He is alert  Mental status is at baseline  Cranial Nerves: No cranial nerve deficit  Motor: No abnormal muscle tone        Coordination: Coordination normal  Comments:   Oriented to self   verbal   answers to questions     Psychiatric:         Mood and Affect: Mood normal          Behavior: Behavior normal          Pertinent Laboratory/Diagnostic Studies:   refer to facility chart  Current Medications   Medications reviewed and updated in facility chart

## 2021-06-05 ENCOUNTER — TELEPHONE (OUTPATIENT)
Dept: OTHER | Facility: OTHER | Age: 86
End: 2021-06-05

## 2021-06-05 NOTE — TELEPHONE ENCOUNTER
The pt needs a new order of Ativan because the pharmacy is not going to have it on time  The pt needs a drop shipment until on or before the 10 th and the pt takes it BID  The on call provider was paged regarding the new order

## 2021-06-06 NOTE — TELEPHONE ENCOUNTER
1) Joseph Leon (10/8/1930) - called by nursing staff requesting a refill of his routine lorazepam ordered  I called in a 1 month refill of his lorazepam, plus a drop shipment, to the pharmacy

## 2021-07-29 ENCOUNTER — NURSING HOME VISIT (OUTPATIENT)
Dept: GERIATRICS | Facility: OTHER | Age: 86
End: 2021-07-29
Payer: MEDICARE

## 2021-07-29 DIAGNOSIS — M19.012 PRIMARY OSTEOARTHRITIS OF BOTH SHOULDERS: ICD-10-CM

## 2021-07-29 DIAGNOSIS — F01.50 VASCULAR DEMENTIA WITHOUT BEHAVIORAL DISTURBANCE (HCC): Primary | ICD-10-CM

## 2021-07-29 DIAGNOSIS — M19.011 PRIMARY OSTEOARTHRITIS OF BOTH SHOULDERS: ICD-10-CM

## 2021-07-29 DIAGNOSIS — F32.89 OTHER DEPRESSION: ICD-10-CM

## 2021-07-29 DIAGNOSIS — F41.9 ANXIETY: ICD-10-CM

## 2021-07-29 DIAGNOSIS — M79.651 THIGH PAIN, MUSCULOSKELETAL, RIGHT: ICD-10-CM

## 2021-07-29 PROCEDURE — 99336 PR DOM/R-HOME E/M EST PT MOD HI SEVERITY 40 MINUTES: CPT | Performed by: NURSE PRACTITIONER

## 2021-07-29 RX ORDER — ESCITALOPRAM OXALATE 10 MG/1
10 TABLET ORAL DAILY
COMMUNITY

## 2021-07-29 NOTE — ASSESSMENT & PLAN NOTE
Stable  Patient denies any mood/sleep and appetite changes  Acknowledged that when sleep hours are disrupted he finds it hard to sleep  Continue Bupropion 100mg BID  Nursing to continue to monitor

## 2021-07-29 NOTE — PROGRESS NOTES
[address], [address], [place], [address] [facility]  [phone]    NAME: Pina Zurita  AGE: 80 y o  SEX: male    Progress Note    Location: [facility]  POS: 13    Assessment/Plan:    Dementia  Stable  Continue to redirect and reorient as often as needed  Anxiety  Stable  Continue Lorazepam 0 5mg BID + PRN  Continue Buspar 10mg TID    Depression  Stable  Patient denies any mood/sleep and appetite changes  Acknowledged that when sleep hours are disrupted he finds it hard to sleep  Continue Bupropion 100mg BID  Nursing to continue to monitor  Primary osteoarthritis of both shoulders  Per patient does not like to take his pain reliever  Encouraged to do so if needed to relieved the pain  Continue Acetaminophen 975mg Q8 hours PRN    Thigh pain, musculoskeletal, right  Start Aspercreme 4% patch daily  Nursing to assess for pain Q shift  Offer PRN Acetaminophen  Consult PT/OT    Chief complaint / Reason for visit: Follow-up visit    History of Present Illness: This is a 80 y o  male patient admitted at [facility] for Dementia  Patient is seen and examined today to follow-up acute and chronic medical conditions: Depression, OA to B/L Shoulders and anxiety  Patient is out of bed for this visit - patient independently ambulatory - denies any problem with ambulation  Patient is hospital help with clear coherent speech - oriented to name/birtgday/date/place  Patient reported acute pain to Right mid anterior thigh - described as worse in the morning, improved and resolved with ambulation  Patient denies any hx of trauma or injury/ fall  On assessment, no erythema, non tender on gentle palpation, no swelling noted  Patient denies any other acute medical concerns for this visit during ROS assessment - "Otherwise I feel pretty good"  Per nursing, other than the right thigh pain, no acute medical concerns for this visit      Review of Systems:  Per history of present illness, all other systems reviewed and negative  HISTORY:  Medical Hx: Reviewed, unchanged  Family Hx: Reviewed, unchanged  Soc Hx: Reviewed,  unchanged    ALLERGY: Reviewed, unchanged  Allergies   Allergen Reactions    Bee Venom         PHYSICAL EXAM:  Vital Signs: T97 8F -P77 -R17 BP: 124/59 SpO2: 94% RA  Weight: 154 0 lbs (7/29/2021)    General: NAD  Head: Atraumatic  Normocephalic  Eye Exam: anicteric sclera, no discharge, PERRLA, No injection  Wear Glasses  Oral Exam: moist mucous membranes, no buccaloropharyngeal erythema, palatine tonsils WNL  Neck Exam: no anterior cervical lymphadenopathy noted, neck supple  Cardiovascular: regular rate, regular rhythm, no murmurs, rubs, or gallops  Pulmonary: no wheeze, no rhonchi, no rales  No chest tenderness  Abdominal: soft, non-tender, nondistended, bowel sounds audible x 4 quadrants  : Non distended bladder  Extremities and skin: no edema noted, no rashes  Intact skin  Slight tenderness to anterior mid right thigh  Neurological: alert, cooperative and responsive, Oriented x 3, moving all 4 extremities symmetrically    Laboratory results / Imaging reviewed: No new lab results to review at this time after Oct, 2020 results    Current Medications: All medications reviewed and updated in Nursing Home Chart    Please note:  Voice-recognition software may have been used in the preparation of this document  Occasional wrong word or "sound-alike" substitutions may have occurred due to the inherent limitations of voice recognition software  Interpretation should be guided by context      PREETI Rodríguez  7/29/2021

## 2021-07-29 NOTE — ASSESSMENT & PLAN NOTE
Per patient does not like to take his pain reliever  Encouraged to do so if needed to relieved the pain  Continue Acetaminophen 975mg Q8 hours PRN

## 2021-07-29 NOTE — ASSESSMENT & PLAN NOTE
Start Aspercreme 4% patch daily  Nursing to assess for pain Q shift  Offer PRN Acetaminophen  Consult PT/OT

## 2021-09-08 ENCOUNTER — TELEPHONE (OUTPATIENT)
Dept: OTHER | Facility: OTHER | Age: 86
End: 2021-09-08

## 2021-09-08 DIAGNOSIS — F41.9 ANXIETY: ICD-10-CM

## 2021-09-08 RX ORDER — LORAZEPAM 0.5 MG/1
TABLET ORAL
Qty: 60 TABLET | Refills: 3 | Status: SHIPPED | OUTPATIENT
Start: 2021-09-08 | End: 2021-12-21 | Stop reason: SDUPTHER

## 2021-09-27 ENCOUNTER — NURSING HOME VISIT (OUTPATIENT)
Dept: GERIATRICS | Facility: OTHER | Age: 86
End: 2021-09-27
Payer: MEDICARE

## 2021-09-27 DIAGNOSIS — R26.2 AMBULATORY DYSFUNCTION: ICD-10-CM

## 2021-09-27 DIAGNOSIS — F41.9 ANXIETY: ICD-10-CM

## 2021-09-27 DIAGNOSIS — F01.50 VASCULAR DEMENTIA WITHOUT BEHAVIORAL DISTURBANCE (HCC): Primary | ICD-10-CM

## 2021-09-27 DIAGNOSIS — M79.651 THIGH PAIN, MUSCULOSKELETAL, RIGHT: ICD-10-CM

## 2021-09-27 PROCEDURE — 99336 PR DOM/R-HOME E/M EST PT MOD HI SEVERITY 40 MINUTES: CPT | Performed by: FAMILY MEDICINE

## 2021-09-28 NOTE — PROGRESS NOTES
[facility]  [address]  [phone]  [address]  [address]      NAME: Darlin Edwards  AGE: 80 y o  SEX: male [id number]    DATE OF ENCOUNTER: 9/27/2021    Assessment and Plan     1  Vascular dementia without behavioral disturbance (HCC)     - redirection, reorientation     - assistance with ADLs, supervision    2  Anxiety/depression     - cont Bupropion 100 mg po bid     - cont Buspirone 10 mg po tid     - cont Escitalopram 10 mg po qd     - cont Lorazepam 0 5 mg po bid    3  Ambulatory dysfunction     - fall precautions in place    4  Thigh pain, musculoskeletal, right     - cont Aspercreme as ordered     - cont Tylenol 975 mg po q8 prn        - Counseling Documentation: patient was counseled regarding: prognosis    Chief Complaint     Routine long term follow up visit  History of Present Illness     HPI  Darlin Edwards, a 79 y/o male is a long term resident at [facility], seen and examined at bedside, stable  He is able to give some history  He is c/o right leg pain, improving, still has band like tightness in the thigh  He is able to walk, doesnot use assistive devices  He denies any falls  He is independent of ADLs under supervision  He is eating and sleeping well  Staff have no concerns at this time  The following portions of the patient's history were reviewed and updated as appropriate: allergies, current medications, past family history, past medical history, past social history, past surgical history and problem list     Review of Systems     Review of Systems   Constitutional: Positive for activity change  Negative for fatigue  HENT: Positive for hearing loss  Negative for dental problem and trouble swallowing  Eyes: Negative  Respiratory: Negative  Cardiovascular: Negative  Gastrointestinal: Negative  Genitourinary: Negative  Musculoskeletal: Positive for arthralgias and gait problem  Neurological: Positive for weakness  Psychiatric/Behavioral: Negative  As in HPI  Active Problem List     Patient Active Problem List   Diagnosis    Dementia ([facility] [address] )    Depression    Anxiety    Ambulatory dysfunction    Insomnia    Primary osteoarthritis of both shoulders    Thigh pain, musculoskeletal, right       Objective     Vitals: T: 98 2, P: 98, R: 16, BP: 122/66, 95% on RA    Physical Exam  Vitals and nursing note reviewed  Constitutional:       General: He is not in acute distress  Appearance: Normal appearance  He is well-developed  He is not diaphoretic  HENT:      Head: Normocephalic and atraumatic  Nose: Nose normal       Mouth/Throat:      Mouth: Mucous membranes are moist       Pharynx: Oropharynx is clear  No oropharyngeal exudate  Eyes:      General: No scleral icterus  Right eye: No discharge  Left eye: No discharge  Extraocular Movements: Extraocular movements intact  Conjunctiva/sclera: Conjunctivae normal    Cardiovascular:      Rate and Rhythm: Normal rate and regular rhythm  Heart sounds: Normal heart sounds  No murmur heard  Pulmonary:      Effort: Pulmonary effort is normal  No respiratory distress  Breath sounds: Normal breath sounds  No wheezing  Chest:      Chest wall: No tenderness  Abdominal:      General: Bowel sounds are normal  There is no distension  Palpations: Abdomen is soft  Tenderness: There is no abdominal tenderness  There is no guarding  Musculoskeletal:         General: No tenderness or deformity  Normal range of motion  Right lower leg: No edema  Left lower leg: No edema  Skin:     General: Skin is warm and dry  Neurological:      Mental Status: He is alert  Cranial Nerves: No cranial nerve deficit  Motor: No abnormal muscle tone  Coordination: Coordination normal       Comments: Oriented to self and place  Able to follow commands     Psychiatric:         Mood and Affect: Mood normal  Behavior: Behavior normal          Pertinent Laboratory/Diagnostic Studies:  Refer to facility chart  Labs done at 8/8/21    Current Medications   Medications reviewed and updated in facility chart

## 2021-11-18 ENCOUNTER — NURSING HOME VISIT (OUTPATIENT)
Dept: GERIATRICS | Facility: OTHER | Age: 86
End: 2021-11-18
Payer: MEDICARE

## 2021-11-18 DIAGNOSIS — F01.50 VASCULAR DEMENTIA WITHOUT BEHAVIORAL DISTURBANCE (HCC): Primary | ICD-10-CM

## 2021-11-18 DIAGNOSIS — M19.012 PRIMARY OSTEOARTHRITIS OF BOTH SHOULDERS: ICD-10-CM

## 2021-11-18 DIAGNOSIS — N49.2 FURUNCLE OF SCROTUM: ICD-10-CM

## 2021-11-18 DIAGNOSIS — M79.651 THIGH PAIN, MUSCULOSKELETAL, RIGHT: ICD-10-CM

## 2021-11-18 DIAGNOSIS — F41.9 ANXIETY: ICD-10-CM

## 2021-11-18 DIAGNOSIS — M19.011 PRIMARY OSTEOARTHRITIS OF BOTH SHOULDERS: ICD-10-CM

## 2021-11-18 DIAGNOSIS — F32.89 OTHER DEPRESSION: ICD-10-CM

## 2021-11-18 PROCEDURE — 99336 PR DOM/R-HOME E/M EST PT MOD HI SEVERITY 40 MINUTES: CPT | Performed by: NURSE PRACTITIONER

## 2021-11-21 PROBLEM — N49.2: Status: ACTIVE | Noted: 2021-11-21

## 2021-12-21 DIAGNOSIS — F41.9 ANXIETY: ICD-10-CM

## 2021-12-21 RX ORDER — LORAZEPAM 0.5 MG/1
0.5 TABLET ORAL 2 TIMES DAILY
Qty: 60 TABLET | Refills: 0 | Status: SHIPPED | OUTPATIENT
Start: 2021-12-21 | End: 2022-02-16 | Stop reason: SDUPTHER

## 2022-01-13 ENCOUNTER — NURSING HOME VISIT (OUTPATIENT)
Dept: GERIATRICS | Facility: OTHER | Age: 87
End: 2022-01-13
Payer: MEDICARE

## 2022-01-13 DIAGNOSIS — G47.00 INSOMNIA, UNSPECIFIED TYPE: ICD-10-CM

## 2022-01-13 DIAGNOSIS — F32.89 OTHER DEPRESSION: ICD-10-CM

## 2022-01-13 DIAGNOSIS — F01.50 VASCULAR DEMENTIA WITHOUT BEHAVIORAL DISTURBANCE (HCC): Primary | ICD-10-CM

## 2022-01-13 DIAGNOSIS — F41.9 ANXIETY: ICD-10-CM

## 2022-01-13 PROCEDURE — 99335 PR DOM/R-HOME E/M EST PT LW MOD SEVERITY 25 MINUTES: CPT | Performed by: NURSE PRACTITIONER

## 2022-01-16 VITALS
RESPIRATION RATE: 18 BRPM | HEART RATE: 72 BPM | SYSTOLIC BLOOD PRESSURE: 122 MMHG | WEIGHT: 147.6 LBS | OXYGEN SATURATION: 98 % | DIASTOLIC BLOOD PRESSURE: 68 MMHG | TEMPERATURE: 97.2 F

## 2022-01-16 RX ORDER — LANOLIN ALCOHOL/MO/W.PET/CERES
6 CREAM (GRAM) TOPICAL
COMMUNITY
End: 2022-03-17 | Stop reason: ALTCHOICE

## 2022-01-16 NOTE — PROGRESS NOTES
[facility]  [address]  [phone]  [address]  [address]    NAME: Fredi Bullock  AGE: 80 y o  SEX: male   DATE OF ENCOUNTER: 1/13/2022    Assessment and Plan     1  Vascular dementia without behavioral disturbance (HCC)  Assessment & Plan:  · Stable   · Ambulatory without device  · AAOx 2 on exam- forgetful pleasant   · No concerns from staff   Provide redirection, reorientation, distraction techniques  Fall Precautions  Assist with ADLs/IADLs  Avoid deliriogenic medications such as tramadol, benzodiazepines, anticholinergics, benadryl  Encourage Hydration/ Nutrition  Implement sleep hygiene, limit night time interuptions   Encourage participation in group activities        2  Anxiety  Assessment & Plan:  · Stable  · Continue Buspirone 10mg TID  · Continue BuPROPion 100 mg Q 12 hrs   · Lorazepam 0 5mg BID       3  Other depression  Assessment & Plan:  · Stable   · Smiling on exam - working on a puzzle   · Continue Lexapro 10 mg daily  · Continue buPROPion  mg Q 12 hrs       4  Insomnia, unspecified type  Assessment & Plan:  · Patient states he is sleeping well   · Continue PRN Melatonin          All medications and routine orders were reviewed and updated as needed  Chief Complaint     LTC follow up visit     History of Present Illness     Fredi Bullock is a 80year old male, he is a LTC resident of [address] [place] [address]  Past Medical Hx including but not limited to Dementia Anxiety/Depression, Ambulatory dysfuncion, Insomnia  seen in collaboration with nursing for medical mgmt and LTC follow up  Seen and examined at bedside today  Patient is a poor historian due to Alzheimer's/Dementia  He is working on a puzzle  History is obtained from review of records and staff and patient report  Patient denies any pain on exam  He states he does get some Right thigh pain which is not new and states Tylenol helps  Patient states he is eating and drinking well   Patient appears well nourished  Staff have no concerns and states Mr Mk Hirsch is doing well, no bowel or bladder issues  The patient's allergies, past medical, surgical, social and family history were reviewed and unchanged  Review of Systems     Review of Systems   Constitutional: Negative for activity change, appetite change, fatigue and fever  HENT: Negative for ear pain, rhinorrhea and trouble swallowing  Eyes: Negative for pain and visual disturbance  Respiratory: Negative for cough, shortness of breath and wheezing  Cardiovascular: Negative for chest pain and palpitations  Gastrointestinal: Negative for abdominal distention, abdominal pain, blood in stool, constipation, diarrhea, nausea and vomiting  Genitourinary: Negative for decreased urine volume, difficulty urinating, dysuria, frequency and hematuria  Musculoskeletal: Positive for myalgias  Negative for arthralgias, back pain and gait problem  Skin: Negative for color change and rash  Neurological: Negative for dizziness, syncope, weakness, light-headedness, numbness and headaches  Psychiatric/Behavioral: Negative for dysphoric mood and sleep disturbance  Objective     Vitals:   Vitals:    01/13/22 1329   BP: 122/68   Pulse: 72   Resp: 18   Temp: (!) 97 2 °F (36 2 °C)   SpO2: 98%         Physical Exam  Vitals and nursing note reviewed  Constitutional:       General: He is not in acute distress  Appearance: Normal appearance  HENT:      Head: Normocephalic and atraumatic  Nose: No congestion or rhinorrhea  Mouth/Throat:      Mouth: Mucous membranes are moist    Eyes:      General: No scleral icterus  Extraocular Movements: Extraocular movements intact  Conjunctiva/sclera: Conjunctivae normal       Pupils: Pupils are equal, round, and reactive to light  Cardiovascular:      Rate and Rhythm: Normal rate and regular rhythm  Pulses: Normal pulses  Heart sounds: Normal heart sounds   No murmur heard  Pulmonary:      Effort: Pulmonary effort is normal       Breath sounds: Normal breath sounds  No wheezing, rhonchi or rales  Abdominal:      General: Bowel sounds are normal  There is no distension  Palpations: Abdomen is soft  Tenderness: There is no abdominal tenderness  Musculoskeletal:         General: No swelling or signs of injury  Lymphadenopathy:      Cervical: No cervical adenopathy  Skin:     General: Skin is warm and dry  Findings: No erythema  Neurological:      Mental Status: He is alert  Mental status is at baseline  He is disoriented  Sensory: No sensory deficit  Motor: No weakness  Gait: Gait normal       Comments: AAOx person and place - clear speech  Forgetful  Psychiatric:         Mood and Affect: Mood normal          Behavior: Behavior normal          Pertinent Laboratory/Diagnostic Studies:  Reviewed in facility chart-stable     Current Medications   Medications reviewed and updated see facility [facility] for details        Current Outpatient Medications:     melatonin 3 mg, Take 6 mg by mouth daily at bedtime as needed, Disp: , Rfl:     acetaminophen (TYLENOL) 325 mg tablet, Take 975 mg by mouth every 8 (eight) hours as needed for mild pain, Disp: , Rfl:     buPROPion (WELLBUTRIN SR) 100 mg 12 hr tablet, Take 1 tablet (100 mg total) by mouth 2 (two) times a day, Disp: 180 tablet, Rfl: 0    busPIRone (BUSPAR) 10 mg tablet, Take 1 tablet (10 mg total) by mouth 3 (three) times a day, Disp: 270 tablet, Rfl: 0    EPINEPHrine (EPIPEN JR) 0 15 mg/0 3 mL SOAJ, Inject 0 15 mg into a muscle once, Disp: , Rfl:     escitalopram (LEXAPRO) 10 mg tablet, Take 10 mg by mouth daily, Disp: , Rfl:     Lidocaine 4 % PTCH, Apply 1 patch topically daily  Apply to Right anterior mid thigh daily (On AM/ OFF HS), Disp: , Rfl:     LORazepam (ATIVAN) 0 5 mg tablet, Take 1 tablet (0 5 mg total) by mouth 2 (two) times a day Take 1 tab po twice a day, Disp: 60 tablet, Rfl: 0     Plan discussed with Dr Cely Amaral noted agreement with assessment and plan  Please note:  Voice-recognition software may have been used in the preparation of this document  Occasional wrong word or "sound-alike" substitutions may have occurred due to the inherent limitations of voice recognition software  Interpretation should be guided by PREETI Godinez  1/13/2022 1:31 PM

## 2022-01-16 NOTE — ASSESSMENT & PLAN NOTE
· Stable   · Ambulatory without device  · AAOx 2 on exam- forgetful pleasant   · No concerns from staff   Provide redirection, reorientation, distraction techniques  Fall Precautions  Assist with ADLs/IADLs  Avoid deliriogenic medications such as tramadol, benzodiazepines, anticholinergics, benadryl  Encourage Hydration/ Nutrition  Implement sleep hygiene, limit night time interuptions   Encourage participation in group activities

## 2022-01-16 NOTE — ASSESSMENT & PLAN NOTE
· Stable  · Continue Buspirone 10mg TID  · Continue BuPROPion 100 mg Q 12 hrs   · Lorazepam 0 5mg BID

## 2022-01-16 NOTE — ASSESSMENT & PLAN NOTE
· Stable   · Smiling on exam - working on a puzzle   · Continue Lexapro 10 mg daily  · Continue buPROPion  mg Q 12 hrs

## 2022-02-16 DIAGNOSIS — F41.9 ANXIETY: ICD-10-CM

## 2022-02-16 DIAGNOSIS — M19.011 PRIMARY OSTEOARTHRITIS OF BOTH SHOULDERS: Primary | ICD-10-CM

## 2022-02-16 DIAGNOSIS — M19.012 PRIMARY OSTEOARTHRITIS OF BOTH SHOULDERS: Primary | ICD-10-CM

## 2022-02-16 RX ORDER — LIDOCAINE HCL 4% 4 G/100G
1 CREAM TOPICAL 4 TIMES DAILY PRN
Qty: 80 ML | Refills: 2 | Status: SHIPPED | OUTPATIENT
Start: 2022-02-16

## 2022-02-16 RX ORDER — LIDOCAINE HCL 4% 4 G/100G
1 CREAM TOPICAL 4 TIMES DAILY PRN
COMMUNITY
End: 2022-02-16 | Stop reason: SDUPTHER

## 2022-02-16 RX ORDER — LORAZEPAM 0.5 MG/1
0.5 TABLET ORAL 2 TIMES DAILY
Qty: 60 TABLET | Refills: 0 | Status: SHIPPED | OUTPATIENT
Start: 2022-02-16 | End: 2022-02-25 | Stop reason: SDUPTHER

## 2022-02-24 ENCOUNTER — TELEPHONE (OUTPATIENT)
Dept: OTHER | Facility: OTHER | Age: 87
End: 2022-02-24

## 2022-02-24 NOTE — TELEPHONE ENCOUNTER
Paula from [facility] called to report the patient is vomiting and has loose BM  Tiger Text  On call  Physician  Arabella Schumacher

## 2022-02-25 DIAGNOSIS — F41.9 ANXIETY: ICD-10-CM

## 2022-02-25 RX ORDER — LORAZEPAM 0.5 MG/1
0.5 TABLET ORAL 2 TIMES DAILY
Qty: 60 TABLET | Refills: 0 | Status: SHIPPED | OUTPATIENT
Start: 2022-02-25 | End: 2022-05-03 | Stop reason: SDUPTHER

## 2022-03-17 ENCOUNTER — NURSING HOME VISIT (OUTPATIENT)
Dept: GERIATRICS | Facility: OTHER | Age: 87
End: 2022-03-17
Payer: MEDICARE

## 2022-03-17 VITALS
TEMPERATURE: 97.6 F | RESPIRATION RATE: 18 BRPM | WEIGHT: 147 LBS | SYSTOLIC BLOOD PRESSURE: 148 MMHG | OXYGEN SATURATION: 95 % | HEART RATE: 82 BPM | DIASTOLIC BLOOD PRESSURE: 77 MMHG

## 2022-03-17 DIAGNOSIS — M19.012 PRIMARY OSTEOARTHRITIS OF BOTH SHOULDERS: ICD-10-CM

## 2022-03-17 DIAGNOSIS — M79.651 THIGH PAIN, MUSCULOSKELETAL, RIGHT: ICD-10-CM

## 2022-03-17 DIAGNOSIS — K59.01 SLOW TRANSIT CONSTIPATION: ICD-10-CM

## 2022-03-17 DIAGNOSIS — M19.011 PRIMARY OSTEOARTHRITIS OF BOTH SHOULDERS: ICD-10-CM

## 2022-03-17 DIAGNOSIS — F01.50 VASCULAR DEMENTIA WITHOUT BEHAVIORAL DISTURBANCE (HCC): Primary | ICD-10-CM

## 2022-03-17 DIAGNOSIS — F41.8 ANXIETY WITH DEPRESSION: ICD-10-CM

## 2022-03-17 PROCEDURE — 99335 PR DOM/R-HOME E/M EST PT LW MOD SEVERITY 25 MINUTES: CPT | Performed by: NURSE PRACTITIONER

## 2022-03-17 RX ORDER — DOCUSATE SODIUM 100 MG/1
100 CAPSULE, LIQUID FILLED ORAL DAILY
COMMUNITY
End: 2022-04-04 | Stop reason: SDUPTHER

## 2022-03-17 RX ORDER — BUSPIRONE HYDROCHLORIDE 10 MG/1
10 TABLET ORAL 2 TIMES DAILY
Qty: 180 TABLET | Refills: 3
Start: 2022-03-17

## 2022-03-17 NOTE — PROGRESS NOTES
[facility]  [address]  [phone]  Manjeet Courts  Code 13        NAME: Tremayne Chiang  AGE: 80 y o  SEX: male CODE STATUS: CPR    DATE OF ENCOUNTER: 3/17/2022    Assessment and Plan     1  Vascular dementia without behavioral disturbance (HCC)  Assessment & Plan:  · Stable   · Ambulatory without device  · AAOx 2 on exam- forgetful pleasant   · No concerns from staff   Provide redirection, reorientation, distraction techniques  Fall Precautions  Assist with ADLs/IADLs  Avoid deliriogenic medications such as tramadol, benzodiazepines, anticholinergics, benadryl  Encourage Hydration/ Nutrition  Implement sleep hygiene, limit night time interuptions   Encourage participation in group activities        2  Thigh pain, musculoskeletal, right  Assessment & Plan:  · Chronic - able to bear weight   · Aspercreme 4% patch daily  · Nursing to assess for pain Q shift  · Offer PRN Acetaminophen        3  Anxiety with depression  Assessment & Plan:  · Stable  · Continue BusPIRone (Buspar) 10mg TID  · Continue BuPROPion  mg Q 12 hrs   · Lorazepam 0 5mg BID   · Lexapro 10 mg daily    Orders:  -     busPIRone (BUSPAR) 10 mg tablet; Take 1 tablet (10 mg total) by mouth 2 (two) times a day    4  Primary osteoarthritis of both shoulders  Assessment & Plan:  · Denies pain on exam  · Continue Tylenol PRN   · Can use Lidocaine 4% cream PRN       5  Slow transit constipation  Assessment & Plan:  · Patient c/o occasional constipation  · States he had bm yesterday  · No concerns from staff  · Start colace 100 mg daily - hold for loose stools          All medications and routine orders were reviewed and updated as needed  Chief Complaint     LTC follow up visit     History of Present Illness     Tremayne Chiang is a 80year old male, he is a LTC resident of [address] [place] [address]   Past Medical Hx including but not limited to Dementia Anxiety/Depression, Ambulatory dysfuncion, Insomnia  seen in collaboration with nursing for medical mgmt and LTC follow up  Seen and examined in activities room  Patient is sitting at table, he uses a rollator at times to ambulate  No reported falls per staff  Patient is a poor historian due to Alzheimer's/Dementia  Patient continues with complaints of his Right thigh pain, this is chronic, states Tylenol helps at times  Describes pain as "shooting" and it comes and goes  He states he had hip surgery in past on right side  He is able to bear weight  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  Will trial Lidocaine cream BID to thigh  Patient states he is eating and drinking well  Patient appears well nourished  He also complains of occasional constipation  Staff have no concerns and states Mr Carlo Henley is doing well, no bowel or bladder issues  The patient's allergies, past medical, surgical, social and family history were reviewed and unchanged  Review of Systems     Review of Systems   Unable to perform ROS: Dementia   Constitutional: Negative for fatigue  Respiratory: Negative for cough and shortness of breath  Cardiovascular: Negative for chest pain and leg swelling  Gastrointestinal: Positive for constipation  Negative for abdominal pain, diarrhea and nausea  Genitourinary: Negative for difficulty urinating, flank pain and frequency  Musculoskeletal: Positive for myalgias  Negative for arthralgias, back pain and gait problem  Neurological: Negative for dizziness and weakness  Objective     Vitals:   Vitals:    03/17/22 1951   BP: 148/77   Pulse: 82   Resp: 18   Temp: 97 6 °F (36 4 °C)   SpO2: 95%         Physical Exam  Vitals and nursing note reviewed  Constitutional:       General: He is not in acute distress  Appearance: Normal appearance  HENT:      Head: Normocephalic and atraumatic  Nose: No congestion or rhinorrhea        Mouth/Throat:      Mouth: Mucous membranes are moist    Eyes:      General: No scleral icterus  Extraocular Movements: Extraocular movements intact  Conjunctiva/sclera: Conjunctivae normal       Pupils: Pupils are equal, round, and reactive to light  Cardiovascular:      Rate and Rhythm: Normal rate and regular rhythm  Pulses: Normal pulses  Heart sounds: Normal heart sounds  No murmur heard  Pulmonary:      Effort: Pulmonary effort is normal       Breath sounds: Normal breath sounds  No wheezing, rhonchi or rales  Abdominal:      General: Bowel sounds are normal  There is no distension  Palpations: Abdomen is soft  Tenderness: There is no abdominal tenderness  Musculoskeletal:         General: No swelling or signs of injury  Lymphadenopathy:      Cervical: No cervical adenopathy  Skin:     General: Skin is warm and dry  Findings: No erythema  Neurological:      Mental Status: He is alert  Mental status is at baseline  He is disoriented  Sensory: No sensory deficit  Motor: No weakness  Gait: Gait normal       Comments: AAOx person and place - clear speech  Forgetful  Psychiatric:         Mood and Affect: Mood normal          Behavior: Behavior normal          Pertinent Laboratory/Diagnostic Studies:  Reviewed in facility chart-stable     Current Medications   Medications reviewed and updated see facility [facility] for details        Current Outpatient Medications:     docusate sodium (COLACE) 100 mg capsule, Take 100 mg by mouth in the morning, Disp: , Rfl:     acetaminophen (TYLENOL) 325 mg tablet, Take 975 mg by mouth every 8 (eight) hours as needed for mild pain, Disp: , Rfl:     buPROPion (WELLBUTRIN SR) 100 mg 12 hr tablet, Take 1 tablet (100 mg total) by mouth 2 (two) times a day, Disp: 180 tablet, Rfl: 0    busPIRone (BUSPAR) 10 mg tablet, Take 1 tablet (10 mg total) by mouth 2 (two) times a day, Disp: 180 tablet, Rfl: 3    EPINEPHrine (EPIPEN JR) 0 15 mg/0 3 mL SOAJ, Inject 0 15 mg into a muscle once, Disp: , Rfl:    escitalopram (LEXAPRO) 10 mg tablet, Take 10 mg by mouth daily, Disp: , Rfl:     Lidocaine HCl 4 % CREA, Apply 1 application topically 4 (four) times a day as needed (arthritis pain) Please deliver to [facility] in [place], [place], Disp: 80 mL, Rfl: 2    LORazepam (ATIVAN) 0 5 mg tablet, Take 1 tablet (0 5 mg total) by mouth 2 (two) times a day Take 1 tab po twice a day, Disp: 60 tablet, Rfl: 0     Plan discussed with Dr Jarred Malin noted agreement with assessment and plan  Please note:  Voice-recognition software may have been used in the preparation of this document  Occasional wrong word or "sound-alike" substitutions may have occurred due to the inherent limitations of voice recognition software  Interpretation should be guided by PREETI Roberts  3/17/2022 7:12 AM

## 2022-03-17 NOTE — ASSESSMENT & PLAN NOTE
· Stable  · Continue BusPIRone (Buspar) 10mg TID  · Continue BuPROPion  mg Q 12 hrs   · Lorazepam 0 5mg BID   · Lexapro 10 mg daily

## 2022-03-17 NOTE — ASSESSMENT & PLAN NOTE
· Chronic - able to bear weight   · Aspercreme 4% patch daily  · Nursing to assess for pain Q shift  · Offer PRN Acetaminophen

## 2022-03-18 NOTE — ASSESSMENT & PLAN NOTE
· Patient c/o occasional constipation  · States he had bm yesterday  · No concerns from staff  · Start colace 100 mg daily - hold for loose stools

## 2022-04-04 DIAGNOSIS — K59.01 SLOW TRANSIT CONSTIPATION: Primary | ICD-10-CM

## 2022-04-04 RX ORDER — DOCUSATE SODIUM 100 MG/1
100 CAPSULE, LIQUID FILLED ORAL DAILY
Qty: 90 CAPSULE | Refills: 3 | Status: SHIPPED | OUTPATIENT
Start: 2022-04-04

## 2022-05-01 ENCOUNTER — TELEPHONE (OUTPATIENT)
Dept: OTHER | Facility: OTHER | Age: 87
End: 2022-05-01

## 2022-05-03 DIAGNOSIS — F41.9 ANXIETY: ICD-10-CM

## 2022-05-03 RX ORDER — LORAZEPAM 0.5 MG/1
0.5 TABLET ORAL 2 TIMES DAILY
Qty: 60 TABLET | Refills: 0 | Status: SHIPPED | OUTPATIENT
Start: 2022-05-03 | End: 2022-05-11 | Stop reason: SDUPTHER

## 2022-05-12 ENCOUNTER — NURSING HOME VISIT (OUTPATIENT)
Dept: GERIATRICS | Facility: OTHER | Age: 87
End: 2022-05-12
Payer: MEDICARE

## 2022-05-12 DIAGNOSIS — M79.651 THIGH PAIN, MUSCULOSKELETAL, RIGHT: ICD-10-CM

## 2022-05-12 DIAGNOSIS — F41.8 ANXIETY WITH DEPRESSION: ICD-10-CM

## 2022-05-12 DIAGNOSIS — M19.011 PRIMARY OSTEOARTHRITIS OF BOTH SHOULDERS: ICD-10-CM

## 2022-05-12 DIAGNOSIS — F32.89 OTHER DEPRESSION: ICD-10-CM

## 2022-05-12 DIAGNOSIS — M19.012 PRIMARY OSTEOARTHRITIS OF BOTH SHOULDERS: ICD-10-CM

## 2022-05-12 DIAGNOSIS — F01.50 VASCULAR DEMENTIA WITHOUT BEHAVIORAL DISTURBANCE (HCC): Primary | ICD-10-CM

## 2022-05-12 PROCEDURE — 99336 PR DOM/R-HOME E/M EST PT MOD HI SEVERITY 40 MINUTES: CPT | Performed by: NURSE PRACTITIONER

## 2022-05-12 NOTE — PROGRESS NOTES
[address], [address] [place] [address], [address] [place] [address]  [phone]    NAME: Cristiano Simon  AGE: 80 y o  SEX: male    Progress Note    Location:   POS: 32 ([facility])    Assessment/Plan:    Dementia  Stable  Continue to redirect and reorient as often as needed      Anxiety  Continue Lorazepam 0 5mg BID  Continue Buspar 10mg TID     Depression  Stable  Patient denies any mood/sleep and appetite changes  Continue Bupropion 100mg BID/ Escitalopram 10mg daily     Primary osteoarthritis of both shoulders  Patient denies any pain on this visit  Continue Acetaminophen 975mg Q8 hours PRN     Thigh pain, musculoskeletal, right  Still have "pulling" sensation when standing up but resolved when walking  Per patient, feels awkward and have to be careful putting his Right foot down when ambulating  Continue Aspercreme 4% patch daily  Consult PT for evaluation and management    Chief complaint / Reason for visit: Follow-up visit    History of Present Illness: This is a 80 y  o  male patient admitted at [facility] for Dementia  Patient is seen and examined today to follow-up acute and chronic medical conditions: Depression, OA to B/L Shoulders and anxiety      Patient is out of bed for this visit - actively ambulatory - alert, cooperative, calm, pleasant and not in distress  Patient is verbal with clear coherent speech - oriented to name/birthday/current date and place  Patient reported persistent/ chronic Right thigh mid anterior thigh "not feeling right", non tender and resolves when ambulating  Per patient it "feels awkward when he stands up and I have to put my right foot carefully" when he ambulates for fear of falling  On assessment, no erythema, no nodules/mass/wound/abscess seen  Both thighs equal in size and muscle mass  Will consult PT  Otherwise patient denies any other acute medical concerns with this visit during ROS assessment    Per nursing no acute medical concerns for this visit as well - described as a baseline and stable  Review of Systems:  Per history of present illness, all other systems reviewed and negative  HISTORY:  Medical Hx: Reviewed, unchanged  Family Hx: Reviewed, unchanged  Soc Hx: Reviewed,  unchanged    ALLERGY: Reviewed, unchanged  Allergies   Allergen Reactions    Bee Venom         PHYSICAL EXAM:  Vital Signs: T97 6F -P68 -R19 BP: 128/60 SpO2: 99% RA  Weight: 147 6 lbs (4/6/2022) <= 147 0 lbs (3/7/2022) <= 149 8 lbs (3/8/2022)    General: NAD  Head: Atraumatic  Normocephalic  Eye Exam: anicteric sclera, no discharge, PERRLA, No injection  Wear glasses  Oral Exam: moist mucous membranes, no buccaloropharyngeal erythema, palatine tonsils WNL  Neck Exam: no anterior cervical lymphadenopathy noted, neck supple  Cardiovascular: regular rate, regular rhythm, no murmurs, rubs, or gallops  Pulmonary: no wheeze, no rhonchi, no rales  No chest tenderness  Abdominal: soft, non-tender, nondistended, bowel sounds audible x 4 quadrants  : Non distended bladder  Continent  Extremities and skin: no edema noted, no rashes  Neurological: alert, cooperative and responsive, Oriented x 3, moving all 4 extremities symmetrically  Independently ambulatory without AD  Laboratory results / Imaging reviewed: Hard copy/ies in medical chart:    * CBC wo diff (3/18/2022) = WNL    * CMP (3/18/2022) = WNL except:  Alb: 3 4 (L)    * Mg level (3/18/2022) = 2 3 (H)    Current Medications: All medications reviewed and updated in Nursing Home Chart    Please note:  Voice-recognition software may have been used in the preparation of this document  Occasional wrong word or "sound-alike" substitutions may have occurred due to the inherent limitations of voice recognition software  Interpretation should be guided by context      PREETI Montoya  5/12/2022

## 2022-05-14 DIAGNOSIS — F41.9 ANXIETY: ICD-10-CM

## 2022-05-17 DIAGNOSIS — F41.9 ANXIETY: ICD-10-CM

## 2022-05-17 RX ORDER — LORAZEPAM 0.5 MG/1
0.5 TABLET ORAL 2 TIMES DAILY
Qty: 10 TABLET | Refills: 0 | Status: SHIPPED | OUTPATIENT
Start: 2022-05-17 | End: 2022-07-14 | Stop reason: SDUPTHER

## 2022-05-17 RX ORDER — LORAZEPAM 0.5 MG/1
0.5 TABLET ORAL 2 TIMES DAILY
Qty: 60 TABLET | Refills: 0 | Status: CANCELLED | OUTPATIENT
Start: 2022-05-17

## 2022-05-17 NOTE — TELEPHONE ENCOUNTER
Patient uses [facility] , script was sent 5/5/22- spoke with [facility]- they state delivery is in transit but listed as "expect a delay"- I did send a 10 day supply to local [facility] for staff to  until delivery is made

## 2022-07-14 ENCOUNTER — NURSING HOME VISIT (OUTPATIENT)
Dept: GERIATRICS | Facility: OTHER | Age: 87
End: 2022-07-14
Payer: MEDICARE

## 2022-07-14 VITALS
RESPIRATION RATE: 18 BRPM | OXYGEN SATURATION: 97 % | DIASTOLIC BLOOD PRESSURE: 77 MMHG | TEMPERATURE: 98.6 F | SYSTOLIC BLOOD PRESSURE: 128 MMHG | HEART RATE: 71 BPM

## 2022-07-14 DIAGNOSIS — F01.50 VASCULAR DEMENTIA WITHOUT BEHAVIORAL DISTURBANCE (HCC): Primary | ICD-10-CM

## 2022-07-14 DIAGNOSIS — R26.2 AMBULATORY DYSFUNCTION: ICD-10-CM
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
## 2022-07-14 DIAGNOSIS — F41.9 ANXIETY: ICD-10-CM

## 2022-07-14 PROCEDURE — 99335 PR DOM/R-HOME E/M EST PT LW MOD SEVERITY 25 MINUTES: CPT | Performed by: NURSE PRACTITIONER

## 2022-07-14 RX ORDER — LORAZEPAM 0.5 MG/1
0.5 TABLET ORAL 2 TIMES DAILY
Qty: 60 TABLET | Refills: 0 | Status: SHIPPED | OUTPATIENT
Start: 2022-07-14 | End: 2022-08-19 | Stop reason: SDUPTHER

## 2022-07-14 NOTE — ASSESSMENT & PLAN NOTE
· Long term use for Hx of Anxiety  · Per staff he is doing well on this dose  · Needs Refill- sent e-script to [address] for 30 day supply

## 2022-07-14 NOTE — ASSESSMENT & PLAN NOTE
· Ambulating around facility without device  · One reported fall in the last 6 months without injury  · Patient denies any pain on exam  · Nursing have no concerns- resident was able to get himself up on his own after fall - denies LOC or headstrike

## 2022-07-14 NOTE — PROGRESS NOTES
[facility]  [address]  [phone]  [address]je[address] Courts  Code 13        NAME: Hailey Ramirez  AGE: 80 y o  SEX: male CODE STATUS: CPR    DATE OF ENCOUNTER: 7/14/2022    Assessment and Plan     1  Vascular dementia without behavioral disturbance (HCC)  Assessment & Plan:  · Stable   · Ambulatory without device  · AAOx 2 on exam- forgetful pleasant   · No concerns from staff   Provide redirection, reorientation, distraction techniques  Fall Precautions  Assist with ADLs/IADLs  Avoid deliriogenic medications such as tramadol, benzodiazepines, anticholinergics, benadryl  Encourage Hydration/ Nutrition  Implement sleep hygiene, limit night time interuptions   Encourage participation in group activities        2  Anxiety  Assessment & Plan:  · Long term use for Hx of Anxiety  · Per staff he is doing well on this dose  · Needs Refill- sent e-script to [address] for 30 day supply    Orders:  -     LORazepam (ATIVAN) 0 5 mg tablet; Take 1 tablet (0 5 mg total) by mouth 2 (two) times a day Take 1 tab po twice a day    3  Ambulatory dysfunction  Assessment & Plan:  · Ambulating around facility without device  · One reported fall in the last 6 months without injury  · Patient denies any pain on exam  · Nursing have no concerns- resident was able to get himself up on his own after fall - denies LOC or headstrike       4  Fall, initial encounter  Assessment & Plan:  · Nursing reports patient fell this week, no injuries  · Patient states his right knee "gave out"   · No swelling/pain/heat noted on exam to Right knee- good ROM  · Continue Tylenol PRN and Aspercream PRN  · Fall Precautions          All medications and routine orders were reviewed and updated as needed  Chief Complaint     LTC follow up visit     History of Present Illness     Hailey Ramirez is a 80year old male, he is a LTC resident of [address] [place] [address]   Past Medical Hx including but not limited to Dementia Anxiety/Depression, Ambulatory dysfuncion, Insomnia  seen in collaboration with nursing for medical mgmt and LTC follow up  Seen and examined at [facility]  Patient ambulating in [place], carrying a watering can  Patient states his knee gave out this week and he fell down, he denies any pain/tenderness/swelling to knee  He denies any pain at all on exam  He states he feels well and offers no complaints  Staff state he was able to get up on his own after fall  Staff have no concerns at this time, they are requesting a refill for his Ativan  The patient's allergies, past medical, surgical, social and family history were reviewed and unchanged  Review of Systems     Review of Systems   Unable to perform ROS: Dementia   Constitutional: Negative for fatigue  Respiratory: Negative for cough and shortness of breath  Cardiovascular: Negative for chest pain and leg swelling  Gastrointestinal: Negative for abdominal pain, constipation, diarrhea and nausea  Genitourinary: Negative for difficulty urinating, flank pain and frequency  Musculoskeletal: Negative for arthralgias, back pain, gait problem and myalgias  Neurological: Negative for dizziness and weakness  Objective     Vitals:   Vitals:    07/14/22 1525   BP: 128/77   Pulse: 71   Resp: 18   Temp: 98 6 °F (37 °C)   SpO2: 97%         Physical Exam  Vitals and nursing note reviewed  Constitutional:       General: He is not in acute distress  Appearance: Normal appearance  HENT:      Head: Normocephalic and atraumatic  Nose: No congestion or rhinorrhea  Mouth/Throat:      Mouth: Mucous membranes are moist    Eyes:      General: No scleral icterus  Extraocular Movements: Extraocular movements intact  Conjunctiva/sclera: Conjunctivae normal       Pupils: Pupils are equal, round, and reactive to light  Cardiovascular:      Rate and Rhythm: Normal rate and regular rhythm  Pulses: Normal pulses        Heart sounds: Normal heart sounds  No murmur heard  Pulmonary:      Effort: Pulmonary effort is normal       Breath sounds: Normal breath sounds  No wheezing, rhonchi or rales  Abdominal:      General: Bowel sounds are normal  There is no distension  Palpations: Abdomen is soft  Tenderness: There is no abdominal tenderness  Musculoskeletal:         General: No swelling or signs of injury  Lymphadenopathy:      Cervical: No cervical adenopathy  Skin:     General: Skin is warm and dry  Findings: No erythema  Neurological:      Mental Status: He is alert  Mental status is at baseline  He is disoriented  Sensory: No sensory deficit  Motor: No weakness  Gait: Gait normal       Comments: AAOx person and place - clear speech  Forgetful  Psychiatric:         Mood and Affect: Mood normal          Behavior: Behavior normal          Pertinent Laboratory/Diagnostic Studies:  Reviewed in facility chart-stable     Current Medications   Medications reviewed and updated see facility [facility] for details        Current Outpatient Medications:     LORazepam (ATIVAN) 0 5 mg tablet, Take 1 tablet (0 5 mg total) by mouth 2 (two) times a day Take 1 tab po twice a day, Disp: 60 tablet, Rfl: 0    acetaminophen (TYLENOL) 325 mg tablet, Take 975 mg by mouth every 8 (eight) hours as needed for mild pain, Disp: , Rfl:     buPROPion (WELLBUTRIN SR) 100 mg 12 hr tablet, Take 1 tablet (100 mg total) by mouth 2 (two) times a day, Disp: 180 tablet, Rfl: 0    busPIRone (BUSPAR) 10 mg tablet, Take 1 tablet (10 mg total) by mouth 2 (two) times a day, Disp: 180 tablet, Rfl: 3    docusate sodium (COLACE) 100 mg capsule, Take 1 capsule (100 mg total) by mouth in the morning, Disp: 90 capsule, Rfl: 3    EPINEPHrine (EPIPEN JR) 0 15 mg/0 3 mL SOAJ, Inject 0 15 mg into a muscle once, Disp: , Rfl:     escitalopram (LEXAPRO) 10 mg tablet, Take 10 mg by mouth daily, Disp: , Rfl:     Lidocaine HCl 4 % CREA, Apply 1 application topically 4 (four) times a day as needed (arthritis pain) Please deliver to [facility] in [place], [place], Disp: 80 mL, Rfl: 2     Plan discussed with Dr Sara Rosalse noted agreement with assessment and plan  Please note:  Voice-recognition software may have been used in the preparation of this document  Occasional wrong word or "sound-alike" substitutions may have occurred due to the inherent limitations of voice recognition software  Interpretation should be guided by context           PREETI Burns  7/14/2022 10:55 AM

## 2022-07-14 NOTE — ASSESSMENT & PLAN NOTE
· Nursing reports patient fell this week, no injuries  · Patient states his right knee "gave out"   · No swelling/pain/heat noted on exam to Right knee- good ROM  · Continue Tylenol PRN and Aspercream PRN  · Fall Precautions

## 2022-08-10 ENCOUNTER — TELEPHONE (OUTPATIENT)
Dept: OTHER | Facility: OTHER | Age: 87
End: 2022-08-10

## 2022-08-10 NOTE — TELEPHONE ENCOUNTER
Resident was choking healbertlich was performed, resident is still complaining that something is still stuck at his throat

## 2022-08-19 DIAGNOSIS — F41.9 ANXIETY: ICD-10-CM

## 2022-08-19 RX ORDER — LORAZEPAM 0.5 MG/1
0.5 TABLET ORAL 2 TIMES DAILY
Qty: 60 TABLET | Refills: 0 | Status: SHIPPED | OUTPATIENT
Start: 2022-08-19 | End: 2022-10-13 | Stop reason: SDUPTHER

## 2022-09-15 ENCOUNTER — NURSING HOME VISIT (OUTPATIENT)
Dept: GERIATRICS | Facility: OTHER | Age: 87
End: 2022-09-15
Payer: MEDICARE

## 2022-09-15 DIAGNOSIS — R13.10 DYSPHAGIA, UNSPECIFIED TYPE: ICD-10-CM

## 2022-09-15 DIAGNOSIS — R26.2 AMBULATORY DYSFUNCTION: ICD-10-CM

## 2022-09-15 DIAGNOSIS — F41.8 ANXIETY WITH DEPRESSION: ICD-10-CM

## 2022-09-15 DIAGNOSIS — M21.371 RIGHT FOOT DROP: Primary | ICD-10-CM

## 2022-09-15 DIAGNOSIS — K59.01 SLOW TRANSIT CONSTIPATION: ICD-10-CM

## 2022-09-15 DIAGNOSIS — F01.50 VASCULAR DEMENTIA WITHOUT BEHAVIORAL DISTURBANCE (HCC): ICD-10-CM

## 2022-09-15 PROCEDURE — 99335 PR DOM/R-HOME E/M EST PT LW MOD SEVERITY 25 MINUTES: CPT | Performed by: NURSE PRACTITIONER

## 2022-09-15 NOTE — ASSESSMENT & PLAN NOTE
Currently ambulates without assistive devices  Observed patient ambulating today with a limp due to right lower extremity weakness and right footdrop noted    Physical therapy department is aware and will be eating patient a brace for right foot and patient's daughter will be making an appointment for patient to see orthopedics  With history of fall in July  Fall precautions

## 2022-09-15 NOTE — ASSESSMENT & PLAN NOTE
Continue 24/7 care and support at [address] [place] [address] and oriented to self, place, and current situation    Verbalized his birth date accurately  Continue supportive care, reorientation, and redirection as needed  Encourage patient to participate in activities  Encourage good sleep hygiene  Avoid deliriogenic medications  Monitor for pain and ensure that pain is adequately controlled

## 2022-09-15 NOTE — PROGRESS NOTES
[facility]  [facility] [address]  Facility:  [address]  POS:  13  Progress Note    Name:  Ferny Link         Age: 80 y o  Sex: Male                Code Status: CPR     Date of encounter: 9/15/2022    Assessment/plan:     Right foot drop  New onset  Patient unable to flex right foot on exam   Patient's daughter will be making an appointment with Orthopedics  Patient is at risk for falls  Fall precautions  Brace ordered for right foot until seen by Orthopedics    Dementia  Continue 24/7 care and support at [facility]  Alert and oriented to self, place, and current situation  Verbalized his birth date accurately  Continue supportive care, reorientation, and redirection as needed  Encourage patient to participate in activities  Encourage good sleep hygiene  Avoid deliriogenic medications  Monitor for pain and ensure that pain is adequately controlled    Ambulatory dysfunction  Currently ambulates without assistive devices  Observed patient ambulating today with a limp due to right lower extremity weakness and right footdrop noted  Physical therapy department is aware and will be eating patient a brace for right foot and patient's daughter will be making an appointment for patient to see orthopedics  With history of fall in July  Fall precautions    Slow transit constipation  Controlled with Colace 100 mg daily  Ensure adequate hydration and nutrition    Anxiety with depression  Patient participates with group activities and Mood appears stable on exam  Continue buspirone 10 mg b i d  Continue bupropion 100 mg b i d    Continue Lexapro 10 mg daily  Monitor electrolytes  Continue psychosocial support    Dysphagia  Recently completed speech therapy sessions and diet has been upgraded to regular    Chief Complaint/Reason for visit: LTC follow up visit    History of Present Illness:  26-year-old male seen and examined for LTC follow up of chronic medical conditions  Patient is resident of [facility]  At time of examination, patient is seated in chair, and appears in no distress  Prior to exam, patient was participating in group exercise  Patient reports that he is concerned with being unable to flex right foot  Right foot drop noted on exam along with right lower extremity weakness  Observed patient ambulating with a limp  Denies having right hip pain  With history of fall in July  PT is aware of right foot drop  Patient's daughter will be making an orthopedic appointment for patient  Patient recently completed speech therapy and his diet has been upgraded to regular  Vital signs are stable  Past Medical History: unchanged from history and physical  Past Medical History:   Diagnosis Date    Insomnia 9/9/2019   No past surgical history on file  The patient's allergies, past medical, surgical, social, and family history were reviewed and unchanged  Review of systems: Review of Systems   Constitutional: Negative for chills and diaphoresis  HENT: Positive for trouble swallowing (Recent)  Negative for congestion  Eyes: Positive for visual disturbance  Respiratory: Negative for cough and shortness of breath  Cardiovascular: Negative for chest pain  Gastrointestinal: Negative for abdominal pain, constipation and diarrhea  Musculoskeletal: Positive for arthralgias and gait problem  Neurological: Negative for dizziness (Occasional when rising too fast from sitting position), syncope, speech difficulty, light-headedness, numbness and headaches  Psychiatric/Behavioral: Negative for behavioral problems, dysphoric mood and hallucinations  The patient is nervous/anxious (Occasionally)  All other systems reviewed and are negative  Medications: All medication orders were reviewed and updated   Please refer to facility [facility] for details    Current Outpatient Medications:     acetaminophen (TYLENOL) 325 mg tablet, Take 975 mg by mouth every 8 (eight) hours as needed for mild pain, Disp: , Rfl:     buPROPion (WELLBUTRIN SR) 100 mg 12 hr tablet, Take 1 tablet (100 mg total) by mouth 2 (two) times a day, Disp: 180 tablet, Rfl: 0    busPIRone (BUSPAR) 10 mg tablet, Take 1 tablet (10 mg total) by mouth 2 (two) times a day, Disp: 180 tablet, Rfl: 3    docusate sodium (COLACE) 100 mg capsule, Take 1 capsule (100 mg total) by mouth in the morning, Disp: 90 capsule, Rfl: 3    EPINEPHrine (EPIPEN JR) 0 15 mg/0 3 mL SOAJ, Inject 0 15 mg into a muscle once, Disp: , Rfl:     escitalopram (LEXAPRO) 10 mg tablet, Take 10 mg by mouth daily, Disp: , Rfl:     Lidocaine HCl 4 % CREA, Apply 1 application topically 4 (four) times a day as needed (arthritis pain) Please deliver to [facility] in [place], [place], Disp: 80 mL, Rfl: 2    LORazepam (ATIVAN) 0 5 mg tablet, Take 1 tablet (0 5 mg total) by mouth 2 (two) times a day Take 1 tab po twice a day, Disp: 60 tablet, Rfl: 0     Allergies: Reviewed and unchanged  Consults reviewed: Other  Labs/Diagnostics (reviewed by this provider): Copy in Chart    Imaging Reviewed:  None today    Physical Exam    Weight:  144 2 lb Temp:  97 6     BP:  120/60       Pulse:  60   Resp:  16  Constitutional: Well-nourished and Normocephalic  Orientation:Person and Place     Physical Exam  Vitals reviewed  Constitutional:       General: He is not in acute distress  Appearance: He is not toxic-appearing or diaphoretic  Comments: Well groomed elderly male who appears with chronic illness  In no distress  HENT:      Head: Normocephalic  Nose: No congestion or rhinorrhea  Mouth/Throat:      Mouth: Mucous membranes are moist       Pharynx: No oropharyngeal exudate  Eyes:      General: No scleral icterus  Right eye: No discharge  Left eye: No discharge  Extraocular Movements: Extraocular movements intact        Conjunctiva/sclera: Conjunctivae normal       Pupils: Pupils are equal, round, and reactive to light  Comments: Wears glasses  Cardiovascular:      Rate and Rhythm: Normal rate and regular rhythm  Pulses: Normal pulses  Pulmonary:      Effort: Pulmonary effort is normal  No respiratory distress  Breath sounds: Normal breath sounds  No wheezing, rhonchi or rales  Abdominal:      General: Bowel sounds are normal  There is no distension  Palpations: Abdomen is soft  Tenderness: There is no abdominal tenderness  There is no guarding  Musculoskeletal:      Cervical back: Neck supple  No rigidity  Right lower leg: No edema  Left lower leg: No edema  Comments: Moves all 4 extremities  Currently with right foot drop  Ambulating with a limp due to right lower extremity weakness  Unable to flex right foot on exam    Lymphadenopathy:      Cervical: No cervical adenopathy  Skin:     General: Skin is warm and dry  Capillary Refill: Capillary refill takes less than 2 seconds  Neurological:      Mental Status: He is alert  Mental status is at baseline  Comments: Alert and oriented to self, place, and current situation  Verbalized birthdate accurately  Psychiatric:         Mood and Affect: Mood normal          Behavior: Behavior normal          Thought Content: Thought content normal        This note was completed in part utilizing m-[employer] fluency direct voice recognition software  Grammatical errors, random word insertion, spelling mistakes, and incomplete sentences may be an occasional consequence of the system secondary to software limitations, ambient noise and hardware issues  At the time of dictation, efforts were made to edit, clarify and/or correct errors  Please read the chart carefully and recognize, using context, where substitutions have occurred    If you have any questions or concerns about the context, text or information contained within the body of this dictation, please contact myself, the provider, for further clarification      [address]  2/07/20306:12 PM

## 2022-09-15 NOTE — ASSESSMENT & PLAN NOTE
Patient participates with group activities and Mood appears stable on exam  Continue buspirone 10 mg b i d  Continue bupropion 100 mg b i d    Continue Lexapro 10 mg daily  Monitor electrolytes  Continue psychosocial support

## 2022-10-13 DIAGNOSIS — F41.9 ANXIETY: ICD-10-CM

## 2022-10-13 RX ORDER — LORAZEPAM 0.5 MG/1
0.5 TABLET ORAL DAILY
Qty: 30 TABLET | Refills: 0 | Status: SHIPPED | OUTPATIENT
Start: 2022-10-13

## 2022-11-10 DIAGNOSIS — F41.9 ANXIETY: ICD-10-CM

## 2022-11-10 RX ORDER — LORAZEPAM 0.5 MG/1
0.5 TABLET ORAL DAILY
Qty: 30 TABLET | Refills: 0 | Status: SHIPPED | OUTPATIENT
Start: 2022-11-10

## 2022-11-17 ENCOUNTER — NURSING HOME VISIT (OUTPATIENT)
Dept: GERIATRICS | Facility: OTHER | Age: 87
End: 2022-11-17

## 2022-11-17 DIAGNOSIS — F01.B0 MODERATE VASCULAR DEMENTIA WITHOUT BEHAVIORAL DISTURBANCE, PSYCHOTIC DISTURBANCE, MOOD DISTURBANCE, OR ANXIETY: ICD-10-CM

## 2022-11-17 DIAGNOSIS — R26.2 AMBULATORY DYSFUNCTION: Primary | ICD-10-CM

## 2022-11-17 DIAGNOSIS — M21.371 RIGHT FOOT DROP: ICD-10-CM

## 2022-11-17 DIAGNOSIS — K59.01 SLOW TRANSIT CONSTIPATION: ICD-10-CM

## 2022-11-17 DIAGNOSIS — F41.8 ANXIETY WITH DEPRESSION: ICD-10-CM

## 2022-11-17 NOTE — PROGRESS NOTES
[facility]  [address]  [phone]  [facility]  POS 13      NAME: Maria Elena Ya  AGE: 80 y o  SEX: male [id number]    DATE OF ENCOUNTER: 11/30/2022    Assessment and Plan     1  Ambulatory dysfunction  - fall precautions in place    2  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, psychotic disturbance, mood disturbance, or anxiety  - redirection, reorientation  - assistance with ADLs  - can self feed with setup    3  Right foot drop  - cont to wear brace    4  Anxiety with depression  - cont escitalopram 10 mg po qd  - cont buspirone 10 mg po bid    5  Slow transit constipation  - cont colace 100 mg po qd      - Counseling Documentation: patient was counseled regarding: prognosis    Chief Complaint     Routine Long term follow-up visit  History of Present Illness     HPI  Maria Elena Ya, a 79 y/o male is a long term resident at [facility] dementia unit  He was seen and examined at bedside, stable  He is able to answer questions  He need mod assistance with ADLs  He doesnot use any assistive devices  He is eating and sleeping well  Staff have no concerns at this time  The following portions of the patient's history were reviewed and updated as appropriate: allergies, current medications, past family history, past medical history, past social history, past surgical history and problem list     Review of Systems     Review of Systems   HENT: Positive for hearing loss  Negative for trouble swallowing  Respiratory: Negative for cough and shortness of breath  Cardiovascular: Negative for chest pain and leg swelling  Gastrointestinal: Negative for abdominal pain, constipation, diarrhea, nausea and vomiting  Musculoskeletal: Positive for gait problem  Skin: Negative  Neurological: Positive for weakness  Psychiatric/Behavioral: Positive for confusion  As in HPI      Active Problem List     Patient Active Problem List   Diagnosis   • Dementia ([facility] )   • Depression   • Anxiety with depression   • Ambulatory dysfunction   • Insomnia   • Primary osteoarthritis of both shoulders   • Thigh pain, musculoskeletal, right   • Furuncle of scrotum   • Slow transit constipation   • Anxiety   • Fall   • Right foot drop   • Dysphagia       Objective     Vitals: T: 97 7, P: 68, R: 16, BP: 136/73, 95% on RA, Wt: 141 8 lbs    Physical Exam  Vitals and nursing note reviewed  Constitutional:       General: He is not in acute distress  Appearance: Normal appearance  He is well-developed and well-nourished  He is not diaphoretic  HENT:      Head: Normocephalic and atraumatic  Nose: Nose normal       Mouth/Throat:      Mouth: Oropharynx is clear and moist  Mucous membranes are moist       Pharynx: Oropharynx is clear  No oropharyngeal exudate  Eyes:      General: No scleral icterus  Right eye: No discharge  Left eye: No discharge  Extraocular Movements: Extraocular movements intact and EOM normal       Conjunctiva/sclera: Conjunctivae normal    Cardiovascular:      Rate and Rhythm: Normal rate and regular rhythm  Heart sounds: Normal heart sounds  No murmur heard  Pulmonary:      Effort: Pulmonary effort is normal  No respiratory distress  Breath sounds: Normal breath sounds  No wheezing  Chest:      Chest wall: No tenderness  Abdominal:      General: Bowel sounds are normal  There is no distension  Palpations: Abdomen is soft  Tenderness: There is no abdominal tenderness  There is no guarding  Musculoskeletal:         General: No tenderness or edema  Normal range of motion  Cervical back: Normal range of motion and neck supple  Right lower leg: No edema  Left lower leg: No edema  Skin:     General: Skin is warm and dry  Neurological:      Mental Status: He is alert  Cranial Nerves: No cranial nerve deficit  Motor: No abnormal muscle tone        Coordination: Coordination normal  Comments: Oriented to person and place  Able to follow commands  Right foot drop, brace on  Psychiatric:         Mood and Affect: Mood and affect and mood normal          Behavior: Behavior normal          Pertinent Laboratory/Diagnostic Studies:  Refer to facility chart  Current Medications   Medications reviewed and updated in facility chart

## 2022-11-30 RX ORDER — DOCUSATE SODIUM 100 MG/1
100 CAPSULE, LIQUID FILLED ORAL DAILY
COMMUNITY

## 2022-12-20 DIAGNOSIS — F41.9 ANXIETY: ICD-10-CM

## 2022-12-20 RX ORDER — LORAZEPAM 0.5 MG/1
0.5 TABLET ORAL DAILY
Qty: 30 TABLET | Refills: 0 | Status: SHIPPED | OUTPATIENT
Start: 2022-12-20

## 2023-01-11 PROBLEM — G56.03 BILATERAL CARPAL TUNNEL SYNDROME: Status: ACTIVE | Noted: 2023-01-11

## 2023-01-12 ENCOUNTER — NURSING HOME VISIT (OUTPATIENT)
Dept: GERIATRICS | Facility: OTHER | Age: 88
End: 2023-01-12

## 2023-01-12 DIAGNOSIS — M21.371 RIGHT FOOT DROP: ICD-10-CM

## 2023-01-12 DIAGNOSIS — M25.532 BILATERAL WRIST PAIN: ICD-10-CM

## 2023-01-12 DIAGNOSIS — F01.B0 MODERATE VASCULAR DEMENTIA WITHOUT BEHAVIORAL DISTURBANCE, PSYCHOTIC DISTURBANCE, MOOD DISTURBANCE, OR ANXIETY: Primary | ICD-10-CM

## 2023-01-12 DIAGNOSIS — F32.89 OTHER DEPRESSION: ICD-10-CM

## 2023-01-12 DIAGNOSIS — M25.531 BILATERAL WRIST PAIN: ICD-10-CM

## 2023-01-12 DIAGNOSIS — F41.8 ANXIETY WITH DEPRESSION: ICD-10-CM

## 2023-01-12 NOTE — PROGRESS NOTES
[facility]  [address]  [phone]  [address]  Code 13        NAME: Cristy Hart  AGE: 80 y o  SEX: male CODE STATUS: CPR    DATE OF ENCOUNTER: 1/12/2023    Assessment and Plan     1  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, psychotic disturbance, mood disturbance, or anxiety  Assessment & Plan:  · Resides at [facility] assisted living memory care  · Also with hx of Anxiety   · Ambulatory without device  · AAOx 2 on exam- forgetful pleasant   · Nursing noted new onset of hallucinations- mild- patient states he "hears someone on other side of wall, calls his name at times"  · Has been on Lexapro, Buspar, Wellbutrin and Ativan long term  We have lowered his dose of Ativan about 3 months ago as well as his BuSpar from 3 times daily dosing to twice daily dosing  · No reports of seizure or tremors- no reported behaviors   · Possible progression of Demenita vs metabolic or infectious cause however no fevers/chills- at baseline per staff  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      Orders:  -     buPROPion (WELLBUTRIN SR) 100 mg 12 hr tablet;  Take 1 tablet (100 mg total) by mouth in the morning for 7 days Do not start before January 23, 2023     2  Right foot drop  Assessment & Plan:  · Noted in September 2022  · Unknown etiology - no focal deficits   · Does have a history of right thigh musculoskeletal pain beginning in July 2021 prior to experiencing right foot drop -patient had denied any falls or trauma however is a poor historian  · Continue PT  · Continue Brace while walking   · No imaging available for review unsure if done prior  · Spoke with nursing-Will obtain plain film x-ray of right foot/ankle, right hip sacrum and right knee due to right foot drop  · Can consider outpatient follow-up with orthopedics versus neurology depending on labs and imaging results and family approval        3  Anxiety with depression  Assessment & Plan:  · In the setting of Alzheimer's dementia  · Staff state his anxiety appears to be well controlled however new onset hallucinations, staff have noted some mild hallucinations which have increased in frequency over the last month  · Continue BusPIRone (Buspar) 10mg BID  · Will taper off of Wellbutrin SR-decrease to 100 mg daily x1 week then discontinue    · Lorazepam 0 5mg Daily(dose had been decreased 3 months ago)  · Lexapro 10 mg daily  · Monitor effectiveness/changes in behaviors notify MD/NP    Orders:  -     buPROPion (WELLBUTRIN SR) 100 mg 12 hr tablet; Take 1 tablet (100 mg total) by mouth in the morning for 7 days Do not start before January 23, 2023     4  Bilateral wrist pain  Assessment & Plan:  · Patient c/o b/l wrist aching pain associated with numbness in his hands   · Patient states, "It's carpal tunnel, running hot water over my hands makes it better"   · Poor historian due to Alzheimer's - no documented hx or work up for carpal tunnel   · DDx include arthritis vs carpal tunnel vs cervical radiculopathy vs neuropathy   · Already on Tylenol which can help with arthritis     Plan:   · PT/OT to eval   · PT/Nusing to obtain wrist splints   · Can consider starting Gabapentin for neuropathy if splints and tylenol are ineffective   · Will obtain ESR, CRP and vitamin B12 levels due to paresthesias      5   Other depression  Assessment & Plan:  · Stable   · Continue Lexapro 10 mg daily   · Bupropion (wellbutrin SR) 100 mg BID   · Buspirone (Buspar) 10 mg BID          All medications and routine orders were reviewed and updated as needed  Chief Complaint     LTC follow up visit     History of Present Illness     Renae Walsh is a 20-year-old male, he is a LTC resident of [address] [place] [address]  Past Medical Hx including but not limited to Dementia Anxiety/Depression, Ambulatory dysfuncion, Insomnia  seen in collaboration with nursing for medical mgmt and LTC follow up  Seen and examined at [facility]  Patient is seen in activities room  Patient is complaining of aching and numbness to his bilateral hands, states running hot water over them helps  Patient believes he has carpal tunnel  Staff have noted right foot drop since September has been seen by PT brace has been obtained  Will order imaging and lab work to determine etiology  Nursing have also noted a slowly progressing onset over the past few months of auditory hallucinations  Patient is hearing voices from next-door through the wall calling his name  Patient denies any other pain on exam today  Patient continues to ambulate without assistive device  Patient staff state he is eating well staying hydrated  No bowel or bladder concerns  Staff have no other concerns at this time  The patient's allergies, past medical, surgical, social and family history were reviewed and unchanged  Review of Systems     Review of Systems   Unable to perform ROS: Dementia   Constitutional: Negative for fatigue  Respiratory: Negative for cough and shortness of breath  Cardiovascular: Negative for chest pain and leg swelling  Gastrointestinal: Negative for abdominal pain, constipation, diarrhea and nausea  Genitourinary: Negative for difficulty urinating, flank pain and frequency  Musculoskeletal: Positive for arthralgias  Negative for back pain, gait problem and myalgias     Neurological: Negative for dizziness and weakness  Objective     Vitals:   Vitals:    01/12/23 1807   BP: 136/73   Pulse: 68   Resp: 18   Temp: (!) 97 2 °F (36 2 °C)   SpO2: 95%         Physical Exam  Vitals and nursing note reviewed  Constitutional:       General: He is not in acute distress  Appearance: Normal appearance  HENT:      Head: Normocephalic and atraumatic  Nose: No congestion or rhinorrhea  Mouth/Throat:      Mouth: Mucous membranes are moist    Eyes:      General: No scleral icterus  Extraocular Movements: Extraocular movements intact  Conjunctiva/sclera: Conjunctivae normal       Pupils: Pupils are equal, round, and reactive to light  Cardiovascular:      Rate and Rhythm: Normal rate and regular rhythm  Pulses: Normal pulses  Heart sounds: Normal heart sounds  No murmur heard  Pulmonary:      Effort: Pulmonary effort is normal       Breath sounds: Normal breath sounds  No wheezing, rhonchi or rales  Abdominal:      General: Bowel sounds are normal  There is no distension  Palpations: Abdomen is soft  Tenderness: There is no abdominal tenderness  Musculoskeletal:         General: No swelling or signs of injury  Comments: Right foot drop  Bilateral hand weakness   Lymphadenopathy:      Cervical: No cervical adenopathy  Skin:     General: Skin is warm and dry  Findings: No erythema  Neurological:      Mental Status: He is alert  Mental status is at baseline  He is disoriented  Sensory: No sensory deficit  Motor: Weakness present  Gait: Gait abnormal       Comments: AAOx person and place - clear speech  Forgetful  Psychiatric:         Mood and Affect: Mood normal          Behavior: Behavior normal          Pertinent Laboratory/Diagnostic Studies:  Reviewed in facility chart-stable     Current Medications   Medications reviewed and updated see facility [facility] for details        Current Outpatient Medications:   •  [START ON 1/23/2023] buPROPion [facility] SR) 100 mg 12 hr tablet, Take 1 tablet (100 mg total) by mouth in the morning for 7 days Do not start before January 23, 2023 , Disp: , Rfl: 0  •  acetaminophen (TYLENOL) 325 mg tablet, Take 975 mg by mouth every 8 (eight) hours as needed for mild pain, Disp: , Rfl:   •  busPIRone (BUSPAR) 10 mg tablet, Take 1 tablet (10 mg total) by mouth 2 (two) times a day, Disp: 180 tablet, Rfl: 3  •  docusate sodium (COLACE) 100 mg capsule, Take 1 capsule (100 mg total) by mouth in the morning, Disp: 90 capsule, Rfl: 3  •  EPINEPHrine (EPIPEN JR) 0 15 mg/0 3 mL SOAJ, Inject 0 15 mg into a muscle once, Disp: , Rfl:   •  escitalopram (LEXAPRO) 10 mg tablet, Take 10 mg by mouth daily, Disp: , Rfl:   •  Lidocaine HCl 4 % CREA, Apply 1 application topically 4 (four) times a day as needed (arthritis pain) Please deliver to [facility] in [address], Disp: 80 mL, Rfl: 2  •  LORazepam (ATIVAN) 0 5 mg tablet, Take 1 tablet (0 5 mg total) by mouth in the morning, Disp: 30 tablet, Rfl: 0     Plan discussed with Dr Freeman Wallace noted agreement with assessment and plan  Please note:  Voice-recognition software may have been used in the preparation of this document  Occasional wrong word or "sound-alike" substitutions may have occurred due to the inherent limitations of voice recognition software  Interpretation should be guided by amee Hendricks  1/12/2023

## 2023-01-17 DIAGNOSIS — F41.9 ANXIETY: ICD-10-CM

## 2023-01-17 RX ORDER — LORAZEPAM 0.5 MG/1
0.5 TABLET ORAL DAILY
Qty: 30 TABLET | Refills: 0 | Status: SHIPPED | OUTPATIENT
Start: 2023-01-17

## 2023-01-19 VITALS
BODY MASS INDEX: 27.59 KG/M2 | WEIGHT: 146 LBS | DIASTOLIC BLOOD PRESSURE: 73 MMHG | HEART RATE: 68 BPM | RESPIRATION RATE: 18 BRPM | SYSTOLIC BLOOD PRESSURE: 136 MMHG | OXYGEN SATURATION: 95 % | TEMPERATURE: 97.2 F

## 2023-01-19 PROBLEM — G56.03 BILATERAL CARPAL TUNNEL SYNDROME: Status: RESOLVED | Noted: 2023-01-11 | Resolved: 2023-01-19

## 2023-01-19 PROBLEM — M25.532 BILATERAL WRIST PAIN: Status: ACTIVE | Noted: 2023-01-19

## 2023-01-19 PROBLEM — M25.531 BILATERAL WRIST PAIN: Status: ACTIVE | Noted: 2023-01-19

## 2023-01-19 NOTE — ASSESSMENT & PLAN NOTE
· Stable   · Continue Lexapro 10 mg daily   · Bupropion (wellbutrin SR) 100 mg BID   · Buspirone (Buspar) 10 mg BID

## 2023-01-19 NOTE — ASSESSMENT & PLAN NOTE
· Resides at [address] [place] [address] living memory care  · Also with hx of Anxiety   · Ambulatory without device  · AAOx 2 on exam- forgetful pleasant   · Nursing noted new onset of hallucinations- mild- patient states he "hears someone on other side of wall, calls his name at times"  · Has been on Lexapro, Buspar, Wellbutrin and Ativan long term  We have lowered his dose of Ativan about 3 months ago as well as his BuSpar from 3 times daily dosing to twice daily dosing  · No reports of seizure or tremors- no reported behaviors   · Possible progression of Demenita vs metabolic or infectious cause however no fevers/chills- at baseline per staff   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

## 2023-01-19 NOTE — ASSESSMENT & PLAN NOTE
· Patient c/o b/l wrist aching pain associated with numbness in his hands   · Patient states, "It's carpal tunnel, running hot water over my hands makes it better"   · Poor historian due to Alzheimer's - no documented hx or work up for carpal tunnel   · DDx include arthritis vs carpal tunnel vs cervical radiculopathy vs neuropathy   · Already on Tylenol which can help with arthritis     Plan:   · PT/OT to eval   · PT/Nusing to obtain wrist splints   · Can consider starting Gabapentin for neuropathy if splints and tylenol are ineffective   · Will obtain ESR, CRP and vitamin B12 levels due to paresthesias

## 2023-01-19 NOTE — ASSESSMENT & PLAN NOTE
· In the setting of Alzheimer's dementia  · Staff state his anxiety appears to be well controlled however new onset hallucinations, staff have noted some mild hallucinations which have increased in frequency over the last month  · Continue BusPIRone (Buspar) 10mg BID  · Will taper off of Wellbutrin SR-decrease to 100 mg daily x1 week then discontinue    · Lorazepam 0 5mg Daily(dose had been decreased 3 months ago)  · Lexapro 10 mg daily  · Monitor effectiveness/changes in behaviors notify MD/NP

## 2023-01-19 NOTE — ASSESSMENT & PLAN NOTE
· Noted in September 2022  · Unknown etiology - no focal deficits   · Does have a history of right thigh musculoskeletal pain beginning in July 2021 prior to experiencing right foot drop -patient had denied any falls or trauma however is a poor historian  · Continue PT  · Continue Brace while walking   · No imaging available for review unsure if done prior  · Spoke with nursing-Will obtain plain film x-ray of right foot/ankle, right hip sacrum and right knee due to right foot drop  · Can consider outpatient follow-up with orthopedics versus neurology depending on labs and imaging results and family approval

## 2023-01-22 RX ORDER — BUPROPION HYDROCHLORIDE 100 MG/1
100 TABLET, EXTENDED RELEASE ORAL DAILY
Refills: 0
Start: 2023-01-23 | End: 2023-01-30

## 2023-03-09 ENCOUNTER — NURSING HOME VISIT (OUTPATIENT)
Dept: GERIATRICS | Facility: OTHER | Age: 88
End: 2023-03-09

## 2023-03-09 DIAGNOSIS — F01.B0 MODERATE VASCULAR DEMENTIA WITHOUT BEHAVIORAL DISTURBANCE, PSYCHOTIC DISTURBANCE, MOOD DISTURBANCE, OR ANXIETY (HCC): Primary | ICD-10-CM

## 2023-03-09 DIAGNOSIS — F41.9 ANXIETY: ICD-10-CM

## 2023-03-09 DIAGNOSIS — K59.01 SLOW TRANSIT CONSTIPATION: ICD-10-CM

## 2023-03-09 DIAGNOSIS — F32.89 OTHER DEPRESSION: ICD-10-CM

## 2023-03-09 DIAGNOSIS — M21.371 RIGHT FOOT DROP: ICD-10-CM

## 2023-03-09 NOTE — PROGRESS NOTES
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, psychotic disturbance, mood disturbance, or anxiety  - redirection, reorientation  - assistance with ADLs  - Fall precautions in place    2  Other depression  - stable  - cont Escitalopram 10 mg po qd    3  Anxiety  - stable  - cont buspirone 10 mg po bid  - cont Lorazepam 0 5 mg po qd    4  Right foot drop  - improving    5  Slow transit constipation  - cont colace 100 mg po qd    - Counseling Documentation: patient was counseled regarding: risk factor reductions    Chief Complaint     Routine Long term follow-up visit  History of Present Illness     HPI  Jackie Ramos, a 81 y/o male is a long term resident at [email or web address], seen and examined, stable  He just came out of the activities  He is able to give some history  Says his leg pain is fine, carpal tunell is not bothering any more, his glasses need to be checked  He wants to be more polite with the people, as other people have more issues than him  Staff have no concerns at this time, he walks independently  The following portions of the patient's history were reviewed and updated as appropriate: allergies, current medications, past family history, past medical history, past social history, past surgical history and problem list     Review of Systems     Review of Systems   Constitutional: Negative for activity change and fatigue  HENT: Positive for hearing loss  Eyes: Negative  Respiratory: Negative  Cardiovascular: Negative  Gastrointestinal: Negative  Genitourinary: Negative  Musculoskeletal: Positive for arthralgias  Negative for gait problem  Neurological: Negative for weakness  Psychiatric/Behavioral: Negative  As in HPI      Active Problem List Patient Active Problem List   Diagnosis   • Dementia ([facility] [place] [address] )   • Depression   • Anxiety with depression   • Ambulatory dysfunction   • Insomnia   • Primary osteoarthritis of both shoulders   • Thigh pain, musculoskeletal, right   • Furuncle of scrotum   • Slow transit constipation   • Anxiety   • Fall   • Right foot drop   • Dysphagia   • Bilateral wrist pain       Objective     Vitals: T: 97 3, P: 68, R: 16, BP: 106/56, 95% on RA, Wt: 148 8 lbs    Physical Exam  Vitals and nursing note reviewed  Constitutional:       General: He is not in acute distress  Appearance: Normal appearance  He is well-developed  He is not diaphoretic  HENT:      Head: Normocephalic and atraumatic  Nose: Nose normal       Mouth/Throat:      Mouth: Mucous membranes are moist       Pharynx: Oropharynx is clear  No oropharyngeal exudate  Eyes:      General: No scleral icterus  Right eye: No discharge  Left eye: No discharge  Conjunctiva/sclera: Conjunctivae normal    Cardiovascular:      Rate and Rhythm: Normal rate and regular rhythm  Heart sounds: Normal heart sounds  No murmur heard  Pulmonary:      Effort: Pulmonary effort is normal  No respiratory distress  Breath sounds: Normal breath sounds  No wheezing  Chest:      Chest wall: No tenderness  Abdominal:      General: Bowel sounds are normal  There is no distension  Palpations: Abdomen is soft  Tenderness: There is no abdominal tenderness  There is no guarding  Musculoskeletal:         General: No tenderness or deformity  Normal range of motion  Cervical back: Normal range of motion and neck supple  Right lower leg: No edema  Left lower leg: No edema  Skin:     General: Skin is warm and dry  Neurological:      Mental Status: He is alert  Cranial Nerves: No cranial nerve deficit  Motor: No abnormal muscle tone        Coordination: Coordination normal       Comments: Oriented to self  Verbal, goal oriented  Able to follow commands  Holds good conversation   Psychiatric:         Mood and Affect: Mood normal          Behavior: Behavior normal          Pertinent Laboratory/Diagnostic Studies:  Refer to facility chart  Current Medications   Medications reviewed and updated in facility chart

## 2023-05-04 ENCOUNTER — NURSING HOME VISIT (OUTPATIENT)
Dept: GERIATRICS | Facility: OTHER | Age: 88
End: 2023-05-04

## 2023-05-04 DIAGNOSIS — F01.B0 MODERATE VASCULAR DEMENTIA WITHOUT BEHAVIORAL DISTURBANCE, PSYCHOTIC DISTURBANCE, MOOD DISTURBANCE, OR ANXIETY (HCC): Primary | ICD-10-CM

## 2023-05-04 DIAGNOSIS — K59.01 SLOW TRANSIT CONSTIPATION: ICD-10-CM

## 2023-05-04 DIAGNOSIS — F41.8 ANXIETY WITH DEPRESSION: ICD-10-CM

## 2023-05-04 DIAGNOSIS — M21.371 RIGHT FOOT DROP: ICD-10-CM

## 2023-05-04 DIAGNOSIS — R05.2 SUBACUTE COUGH: ICD-10-CM

## 2023-05-04 DIAGNOSIS — K21.9 GASTROESOPHAGEAL REFLUX DISEASE WITHOUT ESOPHAGITIS: ICD-10-CM

## 2023-05-04 NOTE — PROGRESS NOTES
[facility]  [address]  [phone]  [address]  [address]        NAME: Jazmine Allison  AGE: 80 y o  SEX: male CODE STATUS: CPR    DATE OF ENCOUNTER: 05/04/23    Assessment and Plan     1  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, psychotic disturbance, mood disturbance, or anxiety ([facility])  Assessment & Plan:  · Resides at [facility] assisted living memory care  · Also with hx of Anxiety   · Ambulatory without device- has foot drop on right   · AAOx 2 on exam- forgetful pleasant   · Wellbutrin discontinued slowly - improvement in hallucinations  · Reviewed CBC,BMP, VIt B 12 all WNL  Plan  · Continue Delirium Precautions  Provide redirection, reorientation, distraction techniques  Fall Precautions  Assist with ADLs/IADLs  Avoid deliriogenic medications such as tramadol, benzodiazepines, anticholinergics, benadryl  Encourage Hydration/ Nutrition  Implement sleep hygiene, limit night time interuptions   Encourage participation in group activities        2  Right foot drop  Assessment & Plan:  · Noted in September 2022  · Unknown etiology - no focal deficits   · Does have a history of right thigh musculoskeletal pain beginning in July 2021 prior to experiencing right foot drop -patient had denied any falls or trauma however is a poor historian  · Continue PT  · Continue Brace while walking   · Reviewed x-ray of right foot/ankle, right hip sacrum and right knee - all WNL except some degenerative changes to sacrum and stable right hip arthroplasty       3  Anxiety with depression  Assessment & Plan:  · In the setting of Alzheimer's dementia  · Mood Stable   · Continue BusPIRone (Buspar) 10mg BID  · Lorazepam 0 5mg Daily  · Lexapro 10 mg daily        4  Slow transit constipation  Assessment & Plan:  Controlled with colace 100 mg daily       5   Gastroesophageal reflux disease without esophagitis  Assessment & Plan:  Patient c/o cough with pain and points to epigastric area and moves hand up towards throat  Cough is dry  Will treat with Famotidine 20 mg daily x 8 weeks       6  Subacute cough  Assessment & Plan:  Patient congested on exam   Also c/o epigastric pain   Started Pepcid  Will start Flonase 2 sprays daily x 7 days and Sosa-tussin prn   Afebrile  Lungs clear   97 % on room air          All medications and routine orders were reviewed and updated as needed  Chief Complaint     LTC follow up visit     History of Present Illness     Otha Gilford is a 51-year-old male, he is a LTC resident of [address] [place] [address]  Past Medical Hx including but not limited to Dementia Anxiety/Depression, Ambulatory dysfuncion, Insomnia  seen in collaboration with nursing for medical mgmt and LTC follow up  Seen and examined at [facility]  Patient is seen in health center today  Patient is c/o cough and epigastric pain  He denies any N/V/D, he denies any CP/SOB/Dizziness  He is eating well and staying hydrated per pateint and staff  He offers no other complaints at this time, states he is getting stronger with PT but still has foot drop  Nursing state he is no longer having hallucinations  Patient denies any other pain on exam today  Patient continues to ambulate without assistive device  Per staff/patient - No bowel or bladder concerns  Staff have no other concerns at this time  The patient's allergies, past medical, surgical, social and family history were reviewed and unchanged  Review of Systems     Review of Systems   Unable to perform ROS: Dementia   Constitutional: Negative for fatigue  Respiratory: Positive for cough  Negative for shortness of breath  Cardiovascular: Negative for chest pain and leg swelling  Gastrointestinal: Negative for abdominal pain, constipation, diarrhea and nausea  Genitourinary: Negative for difficulty urinating, flank pain and frequency  Musculoskeletal: Positive for arthralgias   Negative for back pain, gait problem and myalgias  Neurological: Negative for dizziness and weakness  Objective     Vitals:   Vitals:    05/04/23 1614   BP: 158/81   Pulse: 76   Resp: 18   Temp: (!) 96 6 °F (35 9 °C)   SpO2: 97%         Physical Exam  Vitals and nursing note reviewed  Constitutional:       General: He is not in acute distress  Appearance: Normal appearance  HENT:      Head: Normocephalic and atraumatic  Nose: No congestion or rhinorrhea  Mouth/Throat:      Mouth: Mucous membranes are moist    Eyes:      General: No scleral icterus  Extraocular Movements: Extraocular movements intact  Conjunctiva/sclera: Conjunctivae normal       Pupils: Pupils are equal, round, and reactive to light  Cardiovascular:      Rate and Rhythm: Normal rate and regular rhythm  Pulses: Normal pulses  Heart sounds: Normal heart sounds  No murmur heard  Pulmonary:      Effort: Pulmonary effort is normal       Breath sounds: Normal breath sounds  No wheezing, rhonchi or rales  Abdominal:      General: Bowel sounds are normal  There is no distension  Palpations: Abdomen is soft  Tenderness: There is no abdominal tenderness  Musculoskeletal:         General: No swelling or signs of injury  Comments: Right foot drop  Bilateral hand weakness   Lymphadenopathy:      Cervical: No cervical adenopathy  Skin:     General: Skin is warm and dry  Findings: No erythema  Neurological:      Mental Status: He is alert  Mental status is at baseline  He is disoriented  Sensory: No sensory deficit  Motor: Weakness present  Gait: Gait abnormal       Comments: AAOx person and place - clear speech  Forgetful  Psychiatric:         Mood and Affect: Mood normal          Behavior: Behavior normal          Pertinent Laboratory/Diagnostic Studies:  Reviewed in facility chart-stable     Current Medications   Medications reviewed and updated see facility [facility] for details        Current Outpatient "Medications:     famotidine (PEPCID) 20 mg tablet, Take 20 mg by mouth in the morning, Disp: , Rfl:     fluticasone (FLONASE) 50 mcg/act nasal spray, 1 spray into each nostril daily, Disp: , Rfl:     guaiFENesin (ROBITUSSIN) 100 MG/5ML oral liquid, Take 200 mg by mouth 3 (three) times a day as needed, Disp: , Rfl:     acetaminophen (TYLENOL) 325 mg tablet, Take 975 mg by mouth every 8 (eight) hours as needed for mild pain, Disp: , Rfl:     buPROPion (WELLBUTRIN SR) 100 mg 12 hr tablet, Take 1 tablet (100 mg total) by mouth in the morning for 7 days Do not start before January 23, 2023 , Disp: , Rfl: 0    busPIRone (BUSPAR) 10 mg tablet, Take 1 tablet (10 mg total) by mouth 2 (two) times a day, Disp: 180 tablet, Rfl: 3    docusate sodium (COLACE) 100 mg capsule, Take 1 capsule (100 mg total) by mouth in the morning, Disp: 90 capsule, Rfl: 3    EPINEPHrine (EPIPEN JR) 0 15 mg/0 3 mL SOAJ, Inject 0 15 mg into a muscle once, Disp: , Rfl:     escitalopram (LEXAPRO) 10 mg tablet, Take 10 mg by mouth daily, Disp: , Rfl:     Lidocaine HCl 4 % CREA, Apply 1 application topically 4 (four) times a day as needed (arthritis pain) Please deliver to [facility] in [place], [place], Disp: 80 mL, Rfl: 2    LORazepam (ATIVAN) 0 5 mg tablet, Take 1 tablet (0 5 mg total) by mouth in the morning, Disp: 60 tablet, Rfl: 0       Please note:  Voice-recognition software may have been used in the preparation of this document  Occasional wrong word or \"sound-alike\" substitutions may have occurred due to the inherent limitations of voice recognition software  Interpretation should be guided by PREETI Damian  05/04/23    "

## 2023-05-05 VITALS
RESPIRATION RATE: 18 BRPM | HEART RATE: 76 BPM | SYSTOLIC BLOOD PRESSURE: 158 MMHG | OXYGEN SATURATION: 97 % | TEMPERATURE: 96.6 F | DIASTOLIC BLOOD PRESSURE: 81 MMHG

## 2023-05-05 PROBLEM — K21.9 GASTROESOPHAGEAL REFLUX DISEASE WITHOUT ESOPHAGITIS: Status: ACTIVE | Noted: 2023-05-05

## 2023-05-05 PROBLEM — R05.2 SUBACUTE COUGH: Status: ACTIVE | Noted: 2023-05-05

## 2023-05-05 RX ORDER — FAMOTIDINE 20 MG/1
20 TABLET, FILM COATED ORAL DAILY
COMMUNITY

## 2023-05-05 RX ORDER — GUAIFENESIN 200 MG/10ML
200 LIQUID ORAL 3 TIMES DAILY PRN
COMMUNITY

## 2023-05-05 RX ORDER — FLUTICASONE PROPIONATE 50 MCG
1 SPRAY, SUSPENSION (ML) NASAL DAILY
COMMUNITY

## 2023-05-05 NOTE — ASSESSMENT & PLAN NOTE
Controlled with colace 100 mg daily
Patient c/o cough with pain and points to epigastric area and moves hand up towards throat  Cough is dry  Will treat with Famotidine 20 mg daily x 8 weeks
Patient congested on exam   Also c/o epigastric pain   Started Pepcid  Will start Flonase 2 sprays daily x 7 days and Sosa-tussin prn   Afebrile  Lungs clear   97 % on room air
· In the setting of Alzheimer's dementia  · Mood Stable   · Continue BusPIRone (Buspar) 10mg BID  · Lorazepam 0 5mg Daily  · Lexapro 10 mg daily
· Noted in September 2022  · Unknown etiology - no focal deficits   · Does have a history of right thigh musculoskeletal pain beginning in July 2021 prior to experiencing right foot drop -patient had denied any falls or trauma however is a poor historian  · Continue PT  · Continue Brace while walking   · Reviewed x-ray of right foot/ankle, right hip sacrum and right knee - all WNL except some degenerative changes to sacrum and stable right hip arthroplasty
· Resides at [facility] assisted living memory care  · Also with hx of Anxiety   · Ambulatory without device- has foot drop on right   · AAOx 2 on exam- forgetful pleasant   · Wellbutrin discontinued slowly - improvement in hallucinations  · Reviewed CBC,BMP, VIt B 12 all WNL  Plan  · Continue Delirium Precautions  Provide redirection, reorientation, distraction techniques  Fall Precautions  Assist with ADLs/IADLs  Avoid deliriogenic medications such as tramadol, benzodiazepines, anticholinergics, benadryl  Encourage Hydration/ Nutrition  Implement sleep hygiene, limit night time interuptions   Encourage participation in group activities
operating room

## 2023-05-24 DIAGNOSIS — F41.8 ANXIETY WITH DEPRESSION: ICD-10-CM

## 2023-05-24 RX ORDER — ESCITALOPRAM OXALATE 10 MG/1
10 TABLET ORAL DAILY
Qty: 90 TABLET | Refills: 3 | Status: CANCELLED | OUTPATIENT
Start: 2023-05-24

## 2023-05-24 RX ORDER — ESCITALOPRAM OXALATE 10 MG/1
10 TABLET ORAL DAILY
Qty: 90 TABLET | Refills: 3 | Status: SHIPPED | OUTPATIENT
Start: 2023-05-24

## 2023-05-24 RX ORDER — BUSPIRONE HYDROCHLORIDE 10 MG/1
10 TABLET ORAL 2 TIMES DAILY
Qty: 180 TABLET | Refills: 3 | Status: SHIPPED | OUTPATIENT
Start: 2023-05-24

## 2023-06-08 ENCOUNTER — NURSING HOME VISIT (OUTPATIENT)
Dept: GERIATRICS | Facility: OTHER | Age: 88
End: 2023-06-08

## 2023-06-08 DIAGNOSIS — F01.B0 MODERATE VASCULAR DEMENTIA WITHOUT BEHAVIORAL DISTURBANCE, PSYCHOTIC DISTURBANCE, MOOD DISTURBANCE, OR ANXIETY (HCC): ICD-10-CM

## 2023-06-08 DIAGNOSIS — F33.1 MODERATE EPISODE OF RECURRENT MAJOR DEPRESSIVE DISORDER (HCC): Primary | ICD-10-CM

## 2023-06-08 NOTE — LETTER
"June 26, 2023     [facility]    Patient: Zollie Opitz   YOB: 1930   Date of Visit: 6/8/2023       To Cindy:   Below is my progress note on Zollie Opitz as requested  T    Sincerely,      PREETI Hernandez        CC: No Recipients    PREETI Hernandez  6/26/2023  1:20 PM  Sign when Signing Visit  [facility]  [address]  [phone]  FACILITY: [facility]   Code 13  ACUTE VISIT    Assessment/Plan:    1  Moderate episode of recurrent major depressive disorder [facility])  Assessment & Plan:  · Nursing concerned about patient's change in behavior  · He is less social and restless  · He is making statements that he \"doesn't wasnt to hurt his family like this anymore\"  · Seen at bedside- he is withdrawn, also appears anxious but is having a hard time expressing himself   · He denies pain   · VSS  · Hx of MDD- taken off his wellbutrin in the last 6 months  · Daughter states this is how he was when his depression was bad prior to admission to [facility]  · Discussed with Medical Director who was also available during exam   · Denies SI/HI  Plan:   · Restart Wellbutrin 100 mg BID ( start with 100 mg daily x 1 week then tirate up to BID)   · Continue Buspar (BusPIRone) 10 mg BID   · Continue Lorazepam 0 5 mg daily   · Continue Lexapro 10 mg daily   · Psych consult       2   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, psychotic disturbance, mood disturbance, or anxiety ([facility])  Assessment & Plan:  · Resides at [facility] assisted living memory care  · Also with hx of Anxiety and Depression   · Ambulatory without device- has foot drop on right   · AAOx 2 on exam- forgetful - appears very sad/distrught on exam today   · Wellbutrin was previously discontinued slowly - however he is now having increased depressive symptoms  · Reviewed CBC,BMP, VIt B 12 all WNL  Plan  · Continue Delirium Precautions  · Restart Wellburtrin  • Provide redirection, reorientation, " "distraction techniques  • Fall Precautions  • Assist with ADLs/IADLs  • Avoid deliriogenic medications such as tramadol, benzodiazepines, anticholinergics, benadryl  • Encourage Hydration/ Nutrition  • Implement sleep hygiene, limit night time interuptions   • Encourage participation in group activities               Subjective:     Patient ID: Juan Davis is a 80 y o  male  CODE STATUS: No CPR   Patient is a  LTC resident at [facility]  Seen and evaluated at bedside today for Acute visit per request of nursing for c/o depression and restlessness  PAST MEDICAL HISTORY:  Past Medical History:   Diagnosis Date   • Insomnia 9/9/2019     History reviewed  No pertinent surgical history  Juan Davis is a 80-year-old male, he is a LTC resident of [address] [place] [address]  Past Medical Hx including but not limited to Dementia Anxiety/Depression, Ambulatory dysfuncion, Insomnia  Patient seen in collaboration with nursing for medical mgmt and Acute visit  Seen and examined at [facility] in his bedroom, nurse and Medical director present  Patient is poor historian, he is sitting on edge of bed, states he is sad and worried about \"what he did\" States he \"does not want to hurt his family anymore  \" Daughter states he was like this during his last depressive episode which ultimately pushed her to place him in [facility] for LTC  Patient denies any pain, he denies any CP/SOB/Dizziness  Staff states he is still eating well  Staff state he has become more withdrawn and less social  He offers no other complaints at this time  Per staff/patient - No bowel or bladder concerns  Staff have no other concerns at this time  Review of Systems   Unable to perform ROS: Dementia        Objective:      Physical Exam  Vitals and nursing note reviewed  Exam conducted with a chaperone present  Constitutional:       General: He is not in acute distress  Appearance: Normal appearance     HENT:      Head: Normocephalic and " atraumatic  Nose: No congestion or rhinorrhea  Mouth/Throat:      Mouth: Mucous membranes are moist    Eyes:      General: No scleral icterus  Extraocular Movements: Extraocular movements intact  Conjunctiva/sclera: Conjunctivae normal       Pupils: Pupils are equal, round, and reactive to light  Cardiovascular:      Rate and Rhythm: Normal rate and regular rhythm  Pulses: Normal pulses  Heart sounds: Normal heart sounds  No murmur heard  Pulmonary:      Effort: Pulmonary effort is normal       Breath sounds: Normal breath sounds  No wheezing, rhonchi or rales  Abdominal:      General: Bowel sounds are normal  There is no distension  Palpations: Abdomen is soft  Tenderness: There is no abdominal tenderness  Musculoskeletal:         General: No swelling or signs of injury  Comments: Right foot drop  Bilateral hand weakness   Lymphadenopathy:      Cervical: No cervical adenopathy  Skin:     General: Skin is warm and dry  Findings: No erythema  Neurological:      Mental Status: He is alert and oriented to person, place, and time  Mental status is at baseline  Sensory: No sensory deficit  Motor: No weakness  Gait: Gait normal       Comments: AAOx person and place - clear speech  Forgetful      Psychiatric:         Mood and Affect: Mood normal          Behavior: Behavior normal             Current Outpatient Medications:   •  acetaminophen (TYLENOL) 325 mg tablet, Take 975 mg by mouth every 8 (eight) hours as needed for mild pain, Disp: , Rfl:   •  buPROPion (WELLBUTRIN) 100 mg tablet, Take 1 tablet (100 mg total) by mouth daily at bedtime for 7 days Do not start before June 23, 2023 , Disp: 7 tablet, Rfl: 0  •  [START ON 6/30/2023] buPROPion (WELLBUTRIN) 100 mg tablet, Take 1 tablet (100 mg total) by mouth 2 (two) times a day Do not start before June 30, 2023 , Disp: 180 tablet, Rfl: 3  •  busPIRone (BUSPAR) 10 mg tablet, Take 1 tablet (10 mg "total) by mouth 2 (two) times a day, Disp: 180 tablet, Rfl: 3  •  docusate sodium (COLACE) 100 mg capsule, Take 1 capsule (100 mg total) by mouth in the morning, Disp: 90 capsule, Rfl: 3  •  EPINEPHrine (EPIPEN JR) 0 15 mg/0 3 mL SOAJ, Inject 0 15 mg into a muscle once, Disp: , Rfl:   •  escitalopram (LEXAPRO) 10 mg tablet, Take 1 tablet (10 mg total) by mouth daily, Disp: 90 tablet, Rfl: 3  •  famotidine (PEPCID) 20 mg tablet, Take 20 mg by mouth in the morning, Disp: , Rfl:   •  fluticasone (FLONASE) 50 mcg/act nasal spray, 1 spray into each nostril daily, Disp: , Rfl:   •  guaiFENesin (ROBITUSSIN) 100 MG/5ML oral liquid, Take 200 mg by mouth 3 (three) times a day as needed, Disp: , Rfl:   •  Lidocaine HCl 4 % CREA, Apply 1 application topically 4 (four) times a day as needed (arthritis pain) Please deliver to [facility] in [place], [place], Disp: 80 mL, Rfl: 2  •  LORazepam (ATIVAN) 0 5 mg tablet, Take 1 tablet (0 5 mg total) by mouth in the morning, Disp: 30 tablet, Rfl: 0      Please note:  Voice-recognition software may have been used in the preparation of this document  Occasional wrong word or \"sound-alike\" substitutions may have occurred due to the inherent limitations of voice recognition software  Interpretation should be guided by PREETI De Paz  6/8/2023    "

## 2023-06-15 DIAGNOSIS — F41.9 ANXIETY: ICD-10-CM

## 2023-06-15 RX ORDER — LORAZEPAM 0.5 MG/1
0.5 TABLET ORAL DAILY
Qty: 30 TABLET | Refills: 0 | Status: ON HOLD | OUTPATIENT
Start: 2023-06-15 | End: 2023-07-12 | Stop reason: SDUPTHER

## 2023-06-22 DIAGNOSIS — F32.89 OTHER DEPRESSION: Primary | ICD-10-CM

## 2023-06-22 DIAGNOSIS — F01.B0 MODERATE VASCULAR DEMENTIA WITHOUT BEHAVIORAL DISTURBANCE, PSYCHOTIC DISTURBANCE, MOOD DISTURBANCE, OR ANXIETY (HCC): ICD-10-CM

## 2023-06-22 RX ORDER — BUPROPION HYDROCHLORIDE 100 MG/1
100 TABLET ORAL
Qty: 7 TABLET | Refills: 0 | Status: SHIPPED | OUTPATIENT
Start: 2023-06-23 | End: 2023-06-30

## 2023-06-22 RX ORDER — BUPROPION HYDROCHLORIDE 100 MG/1
100 TABLET ORAL 2 TIMES DAILY
Qty: 180 TABLET | Refills: 3 | Status: SHIPPED | OUTPATIENT
Start: 2023-06-30

## 2023-06-26 NOTE — PROGRESS NOTES
"[facility]    Matthew Foley 79  [phone]  FACILITY: [facility]   Code 13  ACUTE VISIT    Assessment/Plan:    1  Moderate episode of recurrent major depressive disorder [facility])  Assessment & Plan:  · Nursing concerned about patient's change in behavior  · He is less social and restless  · He is making statements that he \"doesn't wasnt to hurt his family like this anymore\"  · Seen at bedside- he is withdrawn, also appears anxious but is having a hard time expressing himself   · He denies pain   · VSS  · Hx of MDD- taken off his wellbutrin in the last 6 months  · Daughter states this is how he was when his depression was bad prior to admission to [facility]  · Discussed with Medical Director who was also available during exam   · Denies SI/HI  Plan:   · Restart Wellbutrin 100 mg BID ( start with 100 mg daily x 1 week then tirate up to BID)   · Continue Buspar (BusPIRone) 10 mg BID   · Continue Lorazepam 0 5 mg daily   · Continue Lexapro 10 mg daily   · Psych consult       2   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, psychotic disturbance, mood disturbance, or anxiety ([facility])  Assessment & Plan:  · Resides at [address] [facility] assisted living memory care  · Also with hx of Anxiety and Depression   · Ambulatory without device- has foot drop on right   · AAOx 2 on exam- forgetful - appears very sad/distrught on exam today   · Wellbutrin was previously discontinued slowly - however he is now having increased depressive symptoms  · Reviewed CBC,BMP, VIt B 12 all WNL  Plan  · Continue Delirium Precautions  · Restart Wellburtrin  Provide redirection, reorientation, distraction techniques  Fall Precautions  Assist with ADLs/IADLs  Avoid deliriogenic medications such as tramadol, benzodiazepines, anticholinergics, benadryl  Encourage Hydration/ Nutrition  Implement sleep hygiene, limit night time interuptions   Encourage participation in group activities               Subjective:      Patient " "ID: Kendrick South is a 80 y o  male  CODE STATUS: No CPR   Patient is a  LTC resident at [facility]  Seen and evaluated at bedside today for Acute visit per request of nursing for c/o depression and restlessness  PAST MEDICAL HISTORY:  Past Medical History:   Diagnosis Date   • Insomnia 9/9/2019     History reviewed  No pertinent surgical history  Kendrick South is a 63-year-old male, he is a LTC resident of [address] [place] [address]  Past Medical Hx including but not limited to Dementia Anxiety/Depression, Ambulatory dysfuncion, Insomnia  Patient seen in collaboration with nursing for medical mgmt and Acute visit  Seen and examined at [facility] in his bedroom, nurse and Medical director present  Patient is poor historian, he is sitting on edge of bed, states he is sad and worried about \"what he did\" States he \"does not want to hurt his family anymore  \" Daughter states he was like this during his last depressive episode which ultimately pushed her to place him in [facility] for LTC  Patient denies any pain, he denies any CP/SOB/Dizziness  Staff states he is still eating well  Staff state he has become more withdrawn and less social  He offers no other complaints at this time  Per staff/patient - No bowel or bladder concerns  Staff have no other concerns at this time  Review of Systems   Unable to perform ROS: Dementia         Objective:       Physical Exam  Vitals and nursing note reviewed  Exam conducted with a chaperone present  Constitutional:       General: He is not in acute distress  Appearance: Normal appearance  HENT:      Head: Normocephalic and atraumatic  Nose: No congestion or rhinorrhea  Mouth/Throat:      Mouth: Mucous membranes are moist    Eyes:      General: No scleral icterus  Extraocular Movements: Extraocular movements intact  Conjunctiva/sclera: Conjunctivae normal       Pupils: Pupils are equal, round, and reactive to light     Cardiovascular:      Rate and " Rhythm: Normal rate and regular rhythm  Pulses: Normal pulses  Heart sounds: Normal heart sounds  No murmur heard  Pulmonary:      Effort: Pulmonary effort is normal       Breath sounds: Normal breath sounds  No wheezing, rhonchi or rales  Abdominal:      General: Bowel sounds are normal  There is no distension  Palpations: Abdomen is soft  Tenderness: There is no abdominal tenderness  Musculoskeletal:         General: No swelling or signs of injury  Comments: Right foot drop  Bilateral hand weakness   Lymphadenopathy:      Cervical: No cervical adenopathy  Skin:     General: Skin is warm and dry  Findings: No erythema  Neurological:      Mental Status: He is alert and oriented to person, place, and time  Mental status is at baseline  Sensory: No sensory deficit  Motor: No weakness  Gait: Gait normal       Comments: AAOx person and place - clear speech  Forgetful      Psychiatric:         Mood and Affect: Mood normal          Behavior: Behavior normal              Current Outpatient Medications:   •  acetaminophen (TYLENOL) 325 mg tablet, Take 975 mg by mouth every 8 (eight) hours as needed for mild pain, Disp: , Rfl:   •  buPROPion (WELLBUTRIN) 100 mg tablet, Take 1 tablet (100 mg total) by mouth daily at bedtime for 7 days Do not start before June 23, 2023 , Disp: 7 tablet, Rfl: 0  •  [START ON 6/30/2023] buPROPion (WELLBUTRIN) 100 mg tablet, Take 1 tablet (100 mg total) by mouth 2 (two) times a day Do not start before June 30, 2023 , Disp: 180 tablet, Rfl: 3  •  busPIRone (BUSPAR) 10 mg tablet, Take 1 tablet (10 mg total) by mouth 2 (two) times a day, Disp: 180 tablet, Rfl: 3  •  docusate sodium (COLACE) 100 mg capsule, Take 1 capsule (100 mg total) by mouth in the morning, Disp: 90 capsule, Rfl: 3  •  EPINEPHrine (EPIPEN JR) 0 15 mg/0 3 mL SOAJ, Inject 0 15 mg into a muscle once, Disp: , Rfl:   •  escitalopram (LEXAPRO) 10 mg tablet, Take 1 tablet (10 mg "total) by mouth daily, Disp: 90 tablet, Rfl: 3  •  famotidine (PEPCID) 20 mg tablet, Take 20 mg by mouth in the morning, Disp: , Rfl:   •  fluticasone (FLONASE) 50 mcg/act nasal spray, 1 spray into each nostril daily, Disp: , Rfl:   •  guaiFENesin (ROBITUSSIN) 100 MG/5ML oral liquid, Take 200 mg by mouth 3 (three) times a day as needed, Disp: , Rfl:   •  Lidocaine HCl 4 % CREA, Apply 1 application topically 4 (four) times a day as needed (arthritis pain) Please deliver to [facility] in [place], [place], Disp: 80 mL, Rfl: 2  •  LORazepam (ATIVAN) 0 5 mg tablet, Take 1 tablet (0 5 mg total) by mouth in the morning, Disp: 30 tablet, Rfl: 0      Please note:  Voice-recognition software may have been used in the preparation of this document  Occasional wrong word or \"sound-alike\" substitutions may have occurred due to the inherent limitations of voice recognition software  Interpretation should be guided by PREETI Geiger  6/8/2023    "

## 2023-06-26 NOTE — ASSESSMENT & PLAN NOTE
"· Nursing concerned about patient's change in behavior  · He is less social and restless  · He is making statements that he \"doesn't wasnt to hurt his family like this anymore\"  · Seen at bedside- he is withdrawn, also appears anxious but is having a hard time expressing himself   · He denies pain   · VSS  · Hx of MDD- taken off his wellbutrin in the last 6 months  · Daughter states this is how he was when his depression was bad prior to admission to [facility]  · Discussed with Medical Director who was also available during exam   · Denies SI/HI  Plan:   · Restart Wellbutrin 100 mg BID ( start with 100 mg daily x 1 week then tirate up to BID)   · Continue Buspar (BusPIRone) 10 mg BID   · Continue Lorazepam 0 5 mg daily   · Continue Lexapro 10 mg daily   · Psych consult   "

## 2023-06-26 NOTE — ASSESSMENT & PLAN NOTE
· Resides at [facility] assisted living memory care  · Also with hx of Anxiety and Depression   · Ambulatory without device- has foot drop on right   · AAOx 2 on exam- forgetful - appears very sad/distrught on exam today   · Wellbutrin was previously discontinued slowly - however he is now having increased depressive symptoms  · Reviewed CBC,BMP, VIt B 12 all WNL  Plan  · Continue Delirium Precautions  · Restart Wellburtrin  Provide redirection, reorientation, distraction techniques  Fall Precautions  Assist with ADLs/IADLs  Avoid deliriogenic medications such as tramadol, benzodiazepines, anticholinergics, benadryl  Encourage Hydration/ Nutrition  Implement sleep hygiene, limit night time interuptions   Encourage participation in group activities

## 2023-07-04 PROBLEM — R05.2 SUBACUTE COUGH: Status: RESOLVED | Noted: 2023-05-05 | Resolved: 2023-07-04

## 2023-07-07 ENCOUNTER — NURSING HOME VISIT (OUTPATIENT)
Dept: GERIATRICS | Facility: OTHER | Age: 88
End: 2023-07-07
Payer: MEDICARE

## 2023-07-07 DIAGNOSIS — K59.01 SLOW TRANSIT CONSTIPATION: ICD-10-CM

## 2023-07-07 DIAGNOSIS — F01.B0 MODERATE VASCULAR DEMENTIA WITHOUT BEHAVIORAL DISTURBANCE, PSYCHOTIC DISTURBANCE, MOOD DISTURBANCE, OR ANXIETY (HCC): ICD-10-CM

## 2023-07-07 DIAGNOSIS — F41.8 ANXIETY WITH DEPRESSION: Primary | ICD-10-CM

## 2023-07-07 DIAGNOSIS — R26.2 AMBULATORY DYSFUNCTION: ICD-10-CM

## 2023-07-07 PROCEDURE — 99349 HOME/RES VST EST MOD MDM 40: CPT | Performed by: FAMILY MEDICINE

## 2023-07-07 NOTE — PROGRESS NOTES
[address]  [phone]  [facility]  POS 13      NAME: Armand Teixeira  AGE: 80 y.o. SEX: male [id number]    DATE OF ENCOUNTER: 7/7/2023    Assessment and Plan     1. Anxiety with depression  - improving  - cont Escitalopram 10 mg po qd  - cont lorazepam 0.5 mg po qam  - cont Buspirone 10 mg po bid    2. 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 Slow transit constipation  - cont colace 100 mg po qd  - encourage po hydration    4. Ambulatory dysfunction  - fall precaution sin place  - doesnot use any assistive devices      - Counseling Documentation: patient was counseled regarding: prognosis    Chief Complaint     Routine Long term follow-up visit. History of Present Illness     HPI  Armand Teixeira, a 79 y/o male is a long term resident at [facility], seen and examined, stable. He found sitting in the family room. He is verbal with intermittent confusion. He has noticeable change in behavior for the last few weeks (restless, anxious withdrawn). His meds were adjusted, his mood and behaviors improving, Still not to his baseline. He is holding conversation, poor eye contact, sleeping and eating okay. Nursing staff have no c/o at this time. The following portions of the patient's history were reviewed and updated as appropriate: allergies, current medications, past family history, past medical history, past social history, past surgical history and problem list.    Review of Systems     Review of Systems   Unable to perform ROS: Dementia   As in HPI.     Active Problem List     Patient Active Problem List   Diagnosis   • Dementia ([facility])   • Depression   • Anxiety with depression   • Ambulatory dysfunction   • Insomnia   • Primary osteoarthritis of both shoulders   • Thigh pain, musculoskeletal, right   • Furuncle of scrotum   • Slow transit constipation   • Anxiety   • Fall   • Right foot drop   • Dysphagia   • Bilateral wrist pain   • Gastroesophageal reflux disease without esophagitis       Objective     Vitals: T: 98.2, P: 81, R: 16, BP: 105/53, 97% on RA, Wt: 133 lbs    Physical Exam  Vitals and nursing note reviewed. Constitutional:       General: He is not in acute distress. Appearance: Normal appearance. He is well-developed. He is not diaphoretic. HENT:      Head: Normocephalic and atraumatic. Nose: Nose normal.      Mouth/Throat:      Mouth: Mucous membranes are moist.      Pharynx: Oropharynx is clear. No oropharyngeal exudate. Eyes:      General: No scleral icterus. Right eye: No discharge. Left eye: No discharge. Conjunctiva/sclera: Conjunctivae normal.   Cardiovascular:      Rate and Rhythm: Normal rate and regular rhythm. Heart sounds: Normal heart sounds. No murmur heard. Pulmonary:      Effort: Pulmonary effort is normal. No respiratory distress. Breath sounds: Normal breath sounds. No wheezing. Chest:      Chest wall: No tenderness. Abdominal:      General: Bowel sounds are normal. There is no distension. Palpations: Abdomen is soft. Tenderness: There is no abdominal tenderness. There is no guarding. Musculoskeletal:         General: No tenderness or deformity. Normal range of motion. Right lower leg: No edema. Left lower leg: No edema. Skin:     General: Skin is warm and dry. Neurological:      Mental Status: He is alert. Cranial Nerves: No cranial nerve deficit. Motor: No abnormal muscle tone. Coordination: Coordination normal.      Comments: Oriented to self  Verbal  Difficult to follow commands     Psychiatric:         Behavior: Behavior normal.      Comments: Poor eye contact, cooperative         Pertinent Laboratory/Diagnostic Studies:  Refer to facility chart.     Current Medications   Medications reviewed and updated in facility chart.

## 2023-07-09 ENCOUNTER — APPOINTMENT (EMERGENCY)
Dept: RADIOLOGY | Facility: HOSPITAL | Age: 88
End: 2023-07-09
Payer: MEDICARE

## 2023-07-09 ENCOUNTER — APPOINTMENT (EMERGENCY)
Dept: CT IMAGING | Facility: HOSPITAL | Age: 88
End: 2023-07-09
Payer: MEDICARE

## 2023-07-09 ENCOUNTER — HOSPITAL ENCOUNTER (INPATIENT)
Facility: HOSPITAL | Age: 88
LOS: 3 days | Discharge: HOME HEALTH | End: 2023-07-12
Attending: EMERGENCY MEDICINE | Admitting: INTERNAL MEDICINE
Payer: MEDICARE

## 2023-07-09 DIAGNOSIS — R77.8 ELEVATED TROPONIN: ICD-10-CM

## 2023-07-09 DIAGNOSIS — J18.1: ICD-10-CM
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
## 2023-07-09 DIAGNOSIS — F33.1 MODERATE EPISODE OF RECURRENT MAJOR DEPRESSIVE DISORDER (HCC): ICD-10-CM

## 2023-07-09 DIAGNOSIS — Z79.899 POLYPHARMACY: ICD-10-CM

## 2023-07-09 DIAGNOSIS — M79.651 THIGH PAIN, MUSCULOSKELETAL, RIGHT: ICD-10-CM

## 2023-07-09 DIAGNOSIS — F01.B2 MODERATE VASCULAR DEMENTIA WITH PSYCHOTIC DISTURBANCE (HCC): ICD-10-CM

## 2023-07-09 DIAGNOSIS — R46.89 BEHAVIORAL CHANGE: Primary | ICD-10-CM

## 2023-07-09 DIAGNOSIS — F41.9 ANXIETY: ICD-10-CM

## 2023-07-09 LAB
2HR DELTA HS TROPONIN: 5 NG/L
4HR DELTA HS TROPONIN: 12 NG/L
ALBUMIN SERPL BCP-MCNC: 4.1 G/DL (ref 3.5–5)
ALP SERPL-CCNC: 63 U/L (ref 34–104)
ALT SERPL W P-5'-P-CCNC: 14 U/L (ref 7–52)
AMMONIA PLAS-SCNC: 36 UMOL/L (ref 18–72)
AMPHETAMINES SERPL QL SCN: NEGATIVE
ANION GAP SERPL CALCULATED.3IONS-SCNC: 10 MMOL/L
APAP SERPL-MCNC: <10 UG/ML (ref 10–20)
AST SERPL W P-5'-P-CCNC: 19 U/L (ref 13–39)
BARBITURATES UR QL: NEGATIVE
BASOPHILS # BLD AUTO: 0.04 THOUSANDS/ÂΜL (ref 0–0.1)
BASOPHILS NFR BLD AUTO: 1 % (ref 0–1)
BENZODIAZ UR QL: NEGATIVE
BILIRUB SERPL-MCNC: 0.43 MG/DL (ref 0.2–1)
BILIRUB UR QL STRIP: NEGATIVE
BUN SERPL-MCNC: 9 MG/DL (ref 5–25)
CALCIUM SERPL-MCNC: 9.4 MG/DL (ref 8.4–10.2)
CARDIAC TROPONIN I PNL SERPL HS: 11 NG/L
CARDIAC TROPONIN I PNL SERPL HS: 16 NG/L
CARDIAC TROPONIN I PNL SERPL HS: 23 NG/L
CHLORIDE SERPL-SCNC: 104 MMOL/L (ref 96–108)
CLARITY UR: CLEAR
CO2 SERPL-SCNC: 25 MMOL/L (ref 21–32)
COCAINE UR QL: NEGATIVE
COLOR UR: NORMAL
CREAT SERPL-MCNC: 0.89 MG/DL (ref 0.6–1.3)
EOSINOPHIL # BLD AUTO: 0.15 THOUSAND/ÂΜL (ref 0–0.61)
EOSINOPHIL NFR BLD AUTO: 2 % (ref 0–6)
ERYTHROCYTE [DISTWIDTH] IN BLOOD BY AUTOMATED COUNT: 13 % (ref 11.6–15.1)
ETHANOL SERPL-MCNC: <10 MG/DL
GFR SERPL CREATININE-BSD FRML MDRD: 74 ML/MIN/1.73SQ M
GLUCOSE SERPL-MCNC: 102 MG/DL (ref 65–140)
GLUCOSE SERPL-MCNC: 108 MG/DL (ref 65–140)
GLUCOSE UR STRIP-MCNC: NEGATIVE MG/DL
HCT VFR BLD AUTO: 39.6 % (ref 36.5–49.3)
HGB BLD-MCNC: 13.3 G/DL (ref 12–17)
HGB UR QL STRIP.AUTO: NEGATIVE
IMM GRANULOCYTES # BLD AUTO: 0.02 THOUSAND/UL (ref 0–0.2)
IMM GRANULOCYTES NFR BLD AUTO: 0 % (ref 0–2)
KETONES UR STRIP-MCNC: NEGATIVE MG/DL
LEUKOCYTE ESTERASE UR QL STRIP: NEGATIVE
LYMPHOCYTES # BLD AUTO: 0.91 THOUSANDS/ÂΜL (ref 0.6–4.47)
LYMPHOCYTES NFR BLD AUTO: 15 % (ref 14–44)
MCH RBC QN AUTO: 32.1 PG (ref 26.8–34.3)
MCHC RBC AUTO-ENTMCNC: 33.6 G/DL (ref 31.4–37.4)
MCV RBC AUTO: 96 FL (ref 82–98)
METHADONE UR QL: NEGATIVE
MONOCYTES # BLD AUTO: 0.85 THOUSAND/ÂΜL (ref 0.17–1.22)
MONOCYTES NFR BLD AUTO: 14 % (ref 4–12)
NEUTROPHILS # BLD AUTO: 4.31 THOUSANDS/ÂΜL (ref 1.85–7.62)
NEUTS SEG NFR BLD AUTO: 68 % (ref 43–75)
NITRITE UR QL STRIP: NEGATIVE
NRBC BLD AUTO-RTO: 0 /100 WBCS
OPIATES UR QL SCN: NEGATIVE
OXYCODONE+OXYMORPHONE UR QL SCN: NEGATIVE
PCP UR QL: NEGATIVE
PH UR STRIP.AUTO: 6.5 [PH]
PLATELET # BLD AUTO: 244 THOUSANDS/UL (ref 149–390)
PMV BLD AUTO: 9.4 FL (ref 8.9–12.7)
POTASSIUM SERPL-SCNC: 3.8 MMOL/L (ref 3.5–5.3)
PROCALCITONIN SERPL-MCNC: <0.05 NG/ML
PROT SERPL-MCNC: 6.3 G/DL (ref 6.4–8.4)
PROT UR STRIP-MCNC: NEGATIVE MG/DL
RBC # BLD AUTO: 4.14 MILLION/UL (ref 3.88–5.62)
SALICYLATES SERPL-MCNC: <5 MG/DL (ref 3–20)
SODIUM SERPL-SCNC: 139 MMOL/L (ref 135–147)
SP GR UR STRIP.AUTO: 1.01 (ref 1–1.03)
THC UR QL: NEGATIVE
TSH SERPL DL<=0.05 MIU/L-ACNC: 4.4 UIU/ML (ref 0.45–4.5)
UROBILINOGEN UR STRIP-ACNC: <2 MG/DL
WBC # BLD AUTO: 6.28 THOUSAND/UL (ref 4.31–10.16)

## 2023-07-09 PROCEDURE — 36415 COLL VENOUS BLD VENIPUNCTURE: CPT | Performed by: EMERGENCY MEDICINE

## 2023-07-09 PROCEDURE — 99223 1ST HOSP IP/OBS HIGH 75: CPT | Performed by: INTERNAL MEDICINE

## 2023-07-09 PROCEDURE — 82140 ASSAY OF AMMONIA: CPT | Performed by: EMERGENCY MEDICINE

## 2023-07-09 PROCEDURE — 84145 PROCALCITONIN (PCT): CPT

## 2023-07-09 PROCEDURE — 84443 ASSAY THYROID STIM HORMONE: CPT | Performed by: EMERGENCY MEDICINE

## 2023-07-09 PROCEDURE — 82948 REAGENT STRIP/BLOOD GLUCOSE: CPT

## 2023-07-09 PROCEDURE — 82077 ASSAY SPEC XCP UR&BREATH IA: CPT | Performed by: EMERGENCY MEDICINE

## 2023-07-09 PROCEDURE — 93005 ELECTROCARDIOGRAM TRACING: CPT

## 2023-07-09 PROCEDURE — 84484 ASSAY OF TROPONIN QUANT: CPT | Performed by: EMERGENCY MEDICINE

## 2023-07-09 PROCEDURE — 80307 DRUG TEST PRSMV CHEM ANLYZR: CPT | Performed by: EMERGENCY MEDICINE

## 2023-07-09 PROCEDURE — 96372 THER/PROPH/DIAG INJ SC/IM: CPT

## 2023-07-09 PROCEDURE — 99285 EMERGENCY DEPT VISIT HI MDM: CPT | Performed by: EMERGENCY MEDICINE

## 2023-07-09 PROCEDURE — G1004 CDSM NDSC: HCPCS

## 2023-07-09 PROCEDURE — 81003 URINALYSIS AUTO W/O SCOPE: CPT | Performed by: EMERGENCY MEDICINE

## 2023-07-09 PROCEDURE — 70450 CT HEAD/BRAIN W/O DYE: CPT

## 2023-07-09 PROCEDURE — 85025 COMPLETE CBC W/AUTO DIFF WBC: CPT | Performed by: EMERGENCY MEDICINE

## 2023-07-09 PROCEDURE — 99285 EMERGENCY DEPT VISIT HI MDM: CPT

## 2023-07-09 PROCEDURE — 71045 X-RAY EXAM CHEST 1 VIEW: CPT

## 2023-07-09 PROCEDURE — 80143 DRUG ASSAY ACETAMINOPHEN: CPT | Performed by: EMERGENCY MEDICINE

## 2023-07-09 PROCEDURE — 80179 DRUG ASSAY SALICYLATE: CPT | Performed by: EMERGENCY MEDICINE

## 2023-07-09 PROCEDURE — 80053 COMPREHEN METABOLIC PANEL: CPT | Performed by: EMERGENCY MEDICINE

## 2023-07-09 RX ORDER — DOCUSATE SODIUM 100 MG/1
100 CAPSULE, LIQUID FILLED ORAL DAILY
Status: DISCONTINUED | OUTPATIENT
Start: 2023-07-09 | End: 2023-07-12 | Stop reason: HOSPADM

## 2023-07-09 RX ORDER — FLUTICASONE PROPIONATE 50 MCG
1 SPRAY, SUSPENSION (ML) NASAL DAILY
Status: DISCONTINUED | OUTPATIENT
Start: 2023-07-09 | End: 2023-07-12 | Stop reason: HOSPADM

## 2023-07-09 RX ORDER — BUPROPION HYDROCHLORIDE 100 MG/1
100 TABLET ORAL 2 TIMES DAILY
Status: DISCONTINUED | OUTPATIENT
Start: 2023-07-09 | End: 2023-07-10

## 2023-07-09 RX ORDER — ENOXAPARIN SODIUM 100 MG/ML
40 INJECTION SUBCUTANEOUS DAILY
Status: DISCONTINUED | OUTPATIENT
Start: 2023-07-09 | End: 2023-07-12 | Stop reason: HOSPADM

## 2023-07-09 RX ORDER — ACETAMINOPHEN 325 MG/1
650 TABLET ORAL EVERY 6 HOURS PRN
Status: DISCONTINUED | OUTPATIENT
Start: 2023-07-09 | End: 2023-07-12 | Stop reason: HOSPADM

## 2023-07-09 RX ORDER — AZITHROMYCIN 250 MG/1
500 TABLET, FILM COATED ORAL ONCE
Status: DISCONTINUED | OUTPATIENT
Start: 2023-07-09 | End: 2023-07-09

## 2023-07-09 RX ORDER — OLANZAPINE 10 MG/1
10 INJECTION, POWDER, LYOPHILIZED, FOR SOLUTION INTRAMUSCULAR ONCE
Status: COMPLETED | OUTPATIENT
Start: 2023-07-09 | End: 2023-07-09

## 2023-07-09 RX ORDER — FAMOTIDINE 20 MG/1
20 TABLET, FILM COATED ORAL DAILY
Status: DISCONTINUED | OUTPATIENT
Start: 2023-07-09 | End: 2023-07-12 | Stop reason: HOSPADM

## 2023-07-09 RX ORDER — WATER 1000 ML/1000ML
INJECTION, SOLUTION INTRAVENOUS
Status: COMPLETED
Start: 2023-07-09 | End: 2023-07-09

## 2023-07-09 RX ORDER — AZITHROMYCIN 250 MG/1
TABLET, FILM COATED ORAL
Qty: 4 TABLET | Refills: 0 | Status: SHIPPED | OUTPATIENT
Start: 2023-07-10 | End: 2023-07-12

## 2023-07-09 RX ORDER — OLANZAPINE 2.5 MG/1
2.5 TABLET ORAL EVERY 6 HOURS PRN
Status: DISCONTINUED | OUTPATIENT
Start: 2023-07-09 | End: 2023-07-12 | Stop reason: HOSPADM

## 2023-07-09 RX ORDER — ESCITALOPRAM OXALATE 10 MG/1
10 TABLET ORAL DAILY
Status: DISCONTINUED | OUTPATIENT
Start: 2023-07-09 | End: 2023-07-12 | Stop reason: HOSPADM

## 2023-07-09 RX ORDER — BUSPIRONE HYDROCHLORIDE 5 MG/1
10 TABLET ORAL 2 TIMES DAILY
Status: DISCONTINUED | OUTPATIENT
Start: 2023-07-09 | End: 2023-07-12 | Stop reason: HOSPADM

## 2023-07-09 RX ORDER — LORAZEPAM 0.5 MG/1
0.5 TABLET ORAL DAILY
Status: DISCONTINUED | OUTPATIENT
Start: 2023-07-10 | End: 2023-07-10

## 2023-07-09 RX ADMIN — ESCITALOPRAM OXALATE 10 MG: 10 TABLET ORAL at 11:57

## 2023-07-09 RX ADMIN — BUPROPION HYDROCHLORIDE 100 MG: 100 TABLET, FILM COATED ORAL at 21:11

## 2023-07-09 RX ADMIN — OLANZAPINE 10 MG: 10 INJECTION, POWDER, FOR SOLUTION INTRAMUSCULAR at 06:42

## 2023-07-09 RX ADMIN — ENOXAPARIN SODIUM 40 MG: 40 INJECTION SUBCUTANEOUS at 11:57

## 2023-07-09 RX ADMIN — BUSPIRONE HYDROCHLORIDE 10 MG: 5 TABLET ORAL at 21:10

## 2023-07-09 RX ADMIN — CEFTRIAXONE SODIUM 1000 MG: 10 INJECTION, POWDER, FOR SOLUTION INTRAVENOUS at 09:21

## 2023-07-09 RX ADMIN — WATER 2.1 ML: 1 INJECTION INTRAMUSCULAR; INTRAVENOUS; SUBCUTANEOUS at 06:43

## 2023-07-09 RX ADMIN — FAMOTIDINE 20 MG: 20 TABLET ORAL at 11:57

## 2023-07-09 RX ADMIN — DOCUSATE SODIUM 100 MG: 100 CAPSULE, LIQUID FILLED ORAL at 11:57

## 2023-07-09 NOTE — ASSESSMENT & PLAN NOTE
Continue for now with Wellbutrin, BuSpar, Lexapro and lorazepam  Gerontology consulted appreciated input

## 2023-07-09 NOTE — ASSESSMENT & PLAN NOTE
· Presents on admission due to changes in behavior that happened lately. Patient coming from [facility] for a care facility. Patient aggressive to staff, refusing care, walking naked outside the room. · Patient with history of major depressive disorder after spouse staff, has been on BuSpar 10 mg 2 times a day, Lexapro 10 mg daily and lorazepam 0.5 mg daily. Recently restarted Wellbutrin 100 mg 2 times a day by geriatric team at the facility, in the setting of changes in behavior. · On admission no concern for acute infection, lab work as well as chest x-ray no concern for pneumonia, UTI.  · S/p 1 dose of c Zyprexa IV 10 mg, QTc normal  Progression of the dementia versus polypharmacy    Plan:  · Gerontology consulted, concern for polypharmacy  · Delirium precautions   · Patient's family (daughter Tierra Goetz) agreeable to talk to the patient at any time if the patient experiences delirium episodes and would appreciate updates daily.   · Consider Zyprexa 2.5 mg IV as needed for severe delusions and psychosis

## 2023-07-09 NOTE — DISCHARGE INSTRUCTIONS
Please make sure to follow-up with psychiatry/primary care physician for further adjustment of medications as needed. Please continue antibiotics until finished. Please return to the emergency department if you develop worsening confusion, further changes in behavior, fever, or any other concerning symptoms.

## 2023-07-09 NOTE — ASSESSMENT & PLAN NOTE
Patient was experiencing episodes of cough and epigastric pain in the past.  Currently denying any cough or abdominal pain  Continue with famotidine 20 mg daily

## 2023-07-09 NOTE — H&P
[facility] [facility]  H&P  Name: Chantal Vera 80 y.o. male I MRN: [id number]  Unit/Bed#: S -01 I Date of Admission: 7/9/2023   Date of Service: 7/9/2023 I Hospital Day: 0      Assessment/Plan   * Moderate vascular dementia with psychotic disturbance [facility])  Assessment & Plan  · Presents on admission due to changes in behavior that happened lately. Patient coming from [facility] for a care facility. Patient aggressive to staff, refusing care, walking naked outside the room. · Patient with history of major depressive disorder after spouse staff, has been on BuSpar 10 mg 2 times a day, Lexapro 10 mg daily and lorazepam 0.5 mg daily. Recently restarted Wellbutrin 100 mg 2 times a day by geriatric team at the facility, in the setting of changes in behavior. · On admission no concern for acute infection, lab work as well as chest x-ray no concern for pneumonia, UTI.  · S/p 1 dose of c Zyprexa IV 10 mg, QTc normal  Progression of the dementia versus polypharmacy    Plan:  · Gerontology consulted, concern for polypharmacy  · Delirium precautions   · Patient's family (daughter Yovany Degroot) agreeable to talk to the patient at any time if the patient experiences delirium episodes and would appreciate updates daily. · Consider Zyprexa 2.5 mg IV as needed for severe delusions and psychosis    Gastroesophageal reflux disease without esophagitis  Assessment & Plan  Patient was experiencing episodes of cough and epigastric pain in the past.  Currently denying any cough or abdominal pain  Continue with famotidine 20 mg daily    Slow transit constipation  Assessment & Plan  Continue with Colace    Anxiety with depression  Assessment & Plan  Continue for now with Wellbutrin, BuSpar, Lexapro and lorazepam  Gerontology consulted appreciated input       VTE Pharmacologic Prophylaxis: VTE Score: 4 Moderate Risk (Score 3-4) - Pharmacological DVT Prophylaxis Ordered: enoxaparin (Lovenox).   Code Status: Level 1 - Full Code patient unable to give an answer however the daughters are okay with full code for now  Discussion with family: Updated  (daughter) via phone. Anticipated Length of Stay: Patient will be admitted on an inpatient basis with an anticipated length of stay of greater than 2 midnights secondary to Behavioral changes. Chief Complaint: behavior change    History of Present Illness:  Nico Virk is a 80 y.o. male with a PMH of Dementia, anxiety and depression who presents with behavioral changes. Per nursing staff at [facility] the patient has been more anxious and delirious for the past month with psychotic episodes including aggression to staff, refusing care, walking naked outside the room. The nurse mentioned that he put himself on the floor and refused to get up the morning before admission. No known triggers that could induce changes in his behavior. Geriatric take was following up with patient at the facility and due to acute changes in the behavior was recommended psych consult and changes in management was done. The daughter Jayashree Saunders who is also power of  mentioned that nothing changed in terms of his interaction with the family, they are in touch every day over the phone, patient has a cell phone, however the daughter lives in [facility] and visits him once a year. In the ED patient received IV Zyprexa 10 mg. On assessment patient appeared calm, cooperative, oriented x3. He expressed big concern about who is going to play the bills for his hospitalization. However he was reassured that his daughter is on top of all his bills, he is still reluctant about that. Denies any complaints at this time. Vital signs, lab work as well as imaging are all unremarkable. Review of Systems:  Review of Systems   Constitutional: Negative for chills and fever. HENT: Negative for ear pain and sore throat. Eyes: Negative for pain and visual disturbance.    Respiratory: Negative for cough and shortness of breath. Cardiovascular: Negative for chest pain and palpitations. Gastrointestinal: Negative for abdominal pain and vomiting. Genitourinary: Negative for dysuria and hematuria. Musculoskeletal: Negative for arthralgias and back pain. Skin: Negative for color change and rash. Neurological: Negative for seizures and syncope. Psychiatric/Behavioral: Positive for confusion. Negative for behavioral problems and hallucinations. The patient is not nervous/anxious. All other systems reviewed and are negative. Past Medical and Surgical History:   Past Medical History:   Diagnosis Date   • Insomnia 9/9/2019       History reviewed. No pertinent surgical history. Meds/Allergies:  Prior to Admission medications    Medication Sig Start Date End Date Taking?  Authorizing Provider   acetaminophen (TYLENOL) 325 mg tablet Take 975 mg by mouth every 8 (eight) hours as needed for mild pain   Yes Historical Provider, MD   azithromycin (ZITHROMAX) 250 mg tablet 1 tablet daily x 4 days starting on 7/10 Do not start before July 10, 2023. 7/10/23 7/13/23 Yes Clifford Kunz MD   buPROPion [facility]) 100 mg tablet Take 1 tablet (100 mg total) by mouth 2 (two) times a day Do not start before June 30, 2023. 6/30/23  Yes PREETI Ma   busPIRone (BUSPAR) 10 mg tablet Take 1 tablet (10 mg total) by mouth 2 (two) times a day 5/24/23  Yes PREETI Ma   docusate sodium (COLACE) 100 mg capsule Take 1 capsule (100 mg total) by mouth in the morning 4/4/22  Yes PREETI Ma   escitalopram (LEXAPRO) 10 mg tablet Take 1 tablet (10 mg total) by mouth daily 5/24/23  Yes PREETI Ma   famotidine (PEPCID) 20 mg tablet Take 20 mg by mouth in the morning   Yes Historical Provider, MD   fluticasone (FLONASE) 50 mcg/act nasal spray 1 spray into each nostril daily   Yes Historical Provider, MD   Lidocaine HCl 4 % CREA Apply 1 application topically 4 (four) times a day as needed (arthritis pain) Please deliver to [facility] in [place], [place] 2/16/22  Yes PREETI Dickey   LORazepam (ATIVAN) 0.5 mg tablet Take 1 tablet (0.5 mg total) by mouth in the morning 6/15/23  Yes PREETI Dickey   EPINEPHrine (EPIPEN JR) 0.15 mg/0.3 mL SOAJ Inject 0.15 mg into a muscle once    Historical Provider, MD   guaiFENesin (ROBITUSSIN) 100 MG/5ML oral liquid Take 200 mg by mouth 3 (three) times a day as needed    Historical Provider, MD   buPROPion [facility]) 100 mg tablet Take 1 tablet (100 mg total) by mouth daily at bedtime for 7 days Do not start before June 23, 2023. 6/23/23 7/9/23  PREETI Dickey     I have reveiwed home medications using records provided by [facility]. Allergies: Allergies   Allergen Reactions   • Bee Venom        Social History:  Marital Status:    Occupation:   Patient Pre-hospital Living Situation: [address] [place] [address]  Patient Pre-hospital Level of Mobility: walks with walker  Patient Pre-hospital Diet Restrictions: none  Substance Use History:   Social History     Substance and Sexual Activity   Alcohol Use None     Social History     Tobacco Use   Smoking Status Former   • Types: Cigarettes   Smokeless Tobacco Never     Social History     Substance and Sexual Activity   Drug Use Never       Family History:  Family History   Family history unknown: Yes       Physical Exam:     Vitals:   Blood Pressure: 146/76 (07/09/23 0942)  Pulse: 67 (07/09/23 0942)  Temperature: (!) 96.6 °F (35.9 °C) (07/09/23 0942)  Temp Source: Axillary (07/09/23 0942)  Respirations: 16 (07/09/23 0942)  SpO2: 99 % (07/09/23 0942)    Physical Exam  Vitals and nursing note reviewed. Constitutional:       General: He is not in acute distress. Appearance: He is well-developed. He is not ill-appearing. HENT:      Head: Normocephalic and atraumatic. Eyes:      Conjunctiva/sclera: Conjunctivae normal.   Cardiovascular:      Rate and Rhythm: Normal rate and regular rhythm. Heart sounds: No murmur heard. Pulmonary:      Effort: Pulmonary effort is normal. No respiratory distress. Breath sounds: Normal breath sounds. Abdominal:      General: Bowel sounds are normal. There is no distension. Palpations: Abdomen is soft. Tenderness: There is no abdominal tenderness. Musculoskeletal:         General: No swelling or tenderness. Cervical back: Neck supple. Right lower leg: No edema. Left lower leg: No edema. Skin:     General: Skin is warm and dry. Capillary Refill: Capillary refill takes less than 2 seconds. Neurological:      General: No focal deficit present. Mental Status: He is alert and oriented to person, place, and time. Mental status is at baseline. Cranial Nerves: No cranial nerve deficit. Sensory: No sensory deficit. Motor: No weakness.    Psychiatric:         Mood and Affect: Mood normal.          Additional Data:     Lab Results:  Results from last 7 days   Lab Units 07/09/23  0200   WBC Thousand/uL 6.28   HEMOGLOBIN g/dL 13.3   HEMATOCRIT % 39.6   PLATELETS Thousands/uL 244   NEUTROS PCT % 68   LYMPHS PCT % 15   MONOS PCT % 14*   EOS PCT % 2     Results from last 7 days   Lab Units 07/09/23  0200   SODIUM mmol/L 139   POTASSIUM mmol/L 3.8   CHLORIDE mmol/L 104   CO2 mmol/L 25   BUN mg/dL 9   CREATININE mg/dL 0.89   ANION GAP mmol/L 10   CALCIUM mg/dL 9.4   ALBUMIN g/dL 4.1   TOTAL BILIRUBIN mg/dL 0.43   ALK PHOS U/L 63   ALT U/L 14   AST U/L 19   GLUCOSE RANDOM mg/dL 102         Results from last 7 days   Lab Units 07/09/23  0155   POC GLUCOSE mg/dl 108         Results from last 7 days   Lab Units 07/09/23  0200   PROCALCITONIN ng/ml <0.05       Lines/Drains:  Invasive Devices     Peripheral Intravenous Line  Duration           Peripheral IV 07/09/23 Right;Ventral (anterior) Forearm <1 day                    Imaging: Reviewed radiology reports from this admission including: chest xray and CT head  CT head without contrast   Final Result by Burton Ramirez MD (07/09 2062)      Moderately degraded study by patient motion demonstrates no gross evidence of acute hemorrhage or ischemia. However, if there is continued clinical concern, a repeat CT brain should be considered. Mild chronic small vessel ischemic changes. Workstation performed: [id number]         XR chest 1 view portable   Final Result by Efrem Rand MD (07/09 7042)      No acute cardiopulmonary disease. Workstation performed: [id number]             EKG and Other Studies Reviewed on Admission:   · EKG: NSR. HR 60.    ** Please Note: This note has been constructed using a voice recognition system.  **

## 2023-07-09 NOTE — LETTER
Thank you for allowing us to participate in the care of your patient, Jonah Birch, who was hospitalized from 7/9/2023 through 7/12/2023 with the admitting diagnosis of altered mental status in the setting of dementia. He was found to be disoriented in his nursing home and aggressive towards staff and at times walking around naked. However, in the hospital after a one-time dose of Zyprexa 10 mg IV he was very well behaved and presented no problems to hospital staff. His MoCA score was a 12 out of 30 indicating moderate cognitive impairment. Geriatrics and psychiatry were consulted and provided recommendations. He was suggested that we add Abilify 2 mg daily to his regimen and discontinue the Wellbutrin 100 mg twice daily along with changing his from 0.5 mg in the morning to 0.5 mg as needed at night. He was discharged back to his nursing home at [facility]. If you have any additional questions or would like to discuss further, please feel free to contact me.     Essie Kulkarni MD  [facility], Hospitalist  [phone]

## 2023-07-09 NOTE — PLAN OF CARE
Problem: MOBILITY - ADULT  Goal: Maintain or return to baseline ADL function  Description: INTERVENTIONS:  -  Assess patient's ability to carry out ADLs; assess patient's baseline for ADL function and identify physical deficits which impact ability to perform ADLs (bathing, care of mouth/teeth, toileting, grooming, dressing, etc.)  - Assess/evaluate cause of self-care deficits   - Assess range of motion  - Assess patient's mobility; develop plan if impaired  - Assess patient's need for assistive devices and provide as appropriate  - Encourage maximum independence but intervene and supervise when necessary  - Involve family in performance of ADLs  - Assess for home care needs following discharge   - Consider OT consult to assist with ADL evaluation and planning for discharge  - Provide patient education as appropriate  Outcome: Progressing  Goal: Maintains/Returns to pre admission functional level  Description: INTERVENTIONS:  - Perform BMAT or MOVE assessment daily.   - Set and communicate daily mobility goal to care team and patient/family/caregiver. - Collaborate with rehabilitation services on mobility goals if consulted  - Perform Range of Motion  times a day. - Reposition patient every  hours.   - Dangle patient  times a day  - Stand patient  times a day  - Ambulate patient  times a day  - Out of bed to chair  times a day   - Out of bed for meals  times a day  - Out of bed for toileting  - Record patient progress and toleration of activity level   Outcome: Progressing     Problem: PAIN - ADULT  Goal: Verbalizes/displays adequate comfort level or baseline comfort level  Description: Interventions:  - Encourage patient to monitor pain and request assistance  - Assess pain using appropriate pain scale  - Administer analgesics based on type and severity of pain and evaluate response  - Implement non-pharmacological measures as appropriate and evaluate response  - Consider cultural and social influences on pain and pain management  - Notify physician/advanced practitioner if interventions unsuccessful or patient reports new pain  Outcome: Progressing     Problem: INFECTION - ADULT  Goal: Absence or prevention of progression during hospitalization  Description: INTERVENTIONS:  - Assess and monitor for signs and symptoms of infection  - Monitor lab/diagnostic results  - Monitor all insertion sites, i.e. indwelling lines, tubes, and drains  - Monitor endotracheal if appropriate and nasal secretions for changes in amount and color  - [place] appropriate cooling/warming therapies per order  - Administer medications as ordered  - Instruct and encourage patient and family to use good hand hygiene technique  - Identify and instruct in appropriate isolation precautions for identified infection/condition  Outcome: Progressing  Goal: Absence of fever/infection during neutropenic period  Description: INTERVENTIONS:  - Monitor WBC    Outcome: Progressing     Problem: SAFETY ADULT  Goal: Maintain or return to baseline ADL function  Description: INTERVENTIONS:  -  Assess patient's ability to carry out ADLs; assess patient's baseline for ADL function and identify physical deficits which impact ability to perform ADLs (bathing, care of mouth/teeth, toileting, grooming, dressing, etc.)  - Assess/evaluate cause of self-care deficits   - Assess range of motion  - Assess patient's mobility; develop plan if impaired  - Assess patient's need for assistive devices and provide as appropriate  - Encourage maximum independence but intervene and supervise when necessary  - Involve family in performance of ADLs  - Assess for home care needs following discharge   - Consider OT consult to assist with ADL evaluation and planning for discharge  - Provide patient education as appropriate  Outcome: Progressing  Goal: Maintains/Returns to pre admission functional level  Description: INTERVENTIONS:  - Perform BMAT or MOVE assessment daily.   - Set and communicate daily mobility goal to care team and patient/family/caregiver. - Collaborate with rehabilitation services on mobility goals if consulted  - Perform Range of Motion  times a day. - Reposition patient every  hours.   - Dangle patient  times a day  - Stand patient  times a day  - Ambulate patient  times a day  - Out of bed to chair  times a day   - Out of bed for meals  times a day  - Out of bed for toileting  - Record patient progress and toleration of activity level   Outcome: Progressing  Goal: Patient will remain free of falls  Description: INTERVENTIONS:  - Educate patient/family on patient safety including physical limitations  - Instruct patient to call for assistance with activity   - Consult OT/PT to assist with strengthening/mobility   - Keep Call bell within reach  - Keep bed low and locked with side rails adjusted as appropriate  - Keep care items and personal belongings within reach  - Initiate and maintain comfort rounds  - Make Fall Risk Sign visible to staff  - Offer Toileting every  Hours, in advance of need  - Initiate/Maintain alarm  - Obtain necessary fall risk management equipment:   - Apply yellow socks and bracelet for high fall risk patients  - Consider moving patient to room near nurses station  Outcome: Progressing     Problem: DISCHARGE PLANNING  Goal: Discharge to home or other facility with appropriate resources  Description: INTERVENTIONS:  - Identify barriers to discharge w/patient and caregiver  - Arrange for needed discharge resources and transportation as appropriate  - Identify discharge learning needs (meds, wound care, etc.)  - Arrange for interpretive services to assist at discharge as needed  - Refer to Case Management Department for coordinating discharge planning if the patient needs post-hospital services based on physician/advanced practitioner order or complex needs related to functional status, cognitive ability, or social support system  Outcome: Progressing Problem: Knowledge Deficit  Goal: Patient/family/caregiver demonstrates understanding of disease process, treatment plan, medications, and discharge instructions  Description: Complete learning assessment and assess knowledge base.   Interventions:  - Provide teaching at level of understanding  - Provide teaching via preferred learning methods  Outcome: Progressing

## 2023-07-09 NOTE — ED PROVIDER NOTES
History  Chief Complaint   Patient presents with   • Medical Problem     Pt reports from dementia unit at [facility]. Per facility pt is having "psychotic break." Pt is calm and cooperative during triage. HPI     28-year-old male presenting to the emergency department for creased behavioral disturbance at [facility]. History is limited due to patient's underlying dementia and inability to speak to staff members at [facility]. Patient states that he is " not worth evaluating" that he was uncooperative to staff because he is " not a good person."    Prior to Admission Medications   Prescriptions Last Dose Informant Patient Reported? Taking? EPINEPHrine (EPIPEN JR) 0.15 mg/0.3 mL SOAJ   Yes No   Sig: Inject 0.15 mg into a muscle once   LORazepam (ATIVAN) 0.5 mg tablet   No No   Sig: Take 1 tablet (0.5 mg total) by mouth in the morning   Lidocaine HCl 4 % CREA   No No   Sig: Apply 1 application topically 4 (four) times a day as needed (arthritis pain) Please deliver to [facility] in [place], [place]   acetaminophen (TYLENOL) 325 mg tablet   Yes No   Sig: Take 975 mg by mouth every 8 (eight) hours as needed for mild pain   buPROPion (WELLBUTRIN) 100 mg tablet   No No   Sig: Take 1 tablet (100 mg total) by mouth daily at bedtime for 7 days Do not start before 2023. buPROPion (WELLBUTRIN) 100 mg tablet   No No   Sig: Take 1 tablet (100 mg total) by mouth 2 (two) times a day Do not start before 2023.    busPIRone (BUSPAR) 10 mg tablet   No No   Sig: Take 1 tablet (10 mg total) by mouth 2 (two) times a day   docusate sodium (COLACE) 100 mg capsule   No No   Sig: Take 1 capsule (100 mg total) by mouth in the morning   escitalopram (LEXAPRO) 10 mg tablet   No No   Sig: Take 1 tablet (10 mg total) by mouth daily   famotidine (PEPCID) 20 mg tablet   Yes No   Sig: Take 20 mg by mouth in the morning   fluticasone (FLONASE) 50 mcg/act nasal spray   Yes No   Si spray into each nostril daily   guaiFENesin (ROBITUSSIN) 100 MG/5ML oral liquid   Yes No   Sig: Take 200 mg by mouth 3 (three) times a day as needed      Facility-Administered Medications: None       Past Medical History:   Diagnosis Date   • Insomnia 9/9/2019       History reviewed. No pertinent surgical history. Family History   Family history unknown: Yes     I have reviewed and agree with the history as documented. E-Cigarette/Vaping     E-Cigarette/Vaping Substances     Social History     Tobacco Use   • Smoking status: Former     Types: Cigarettes   • Smokeless tobacco: Never   Substance Use Topics   • Drug use: Never       Review of Systems   Unable to perform ROS: Dementia       Physical Exam  Physical Exam  Vitals and nursing note reviewed. Constitutional:       General: He is not in acute distress. Appearance: He is well-developed. He is not diaphoretic. HENT:      Head: Normocephalic and atraumatic. Right Ear: External ear normal.      Left Ear: External ear normal.      Nose: Nose normal.      Mouth/Throat:      Mouth: Mucous membranes are moist.   Eyes:      General:         Right eye: No discharge. Left eye: No discharge. Extraocular Movements: Extraocular movements intact. Conjunctiva/sclera: Conjunctivae normal.      Pupils: Pupils are equal, round, and reactive to light. Cardiovascular:      Rate and Rhythm: Normal rate and regular rhythm. Heart sounds: Normal heart sounds. No friction rub. No gallop. Pulmonary:      Effort: Pulmonary effort is normal. No respiratory distress. Breath sounds: Normal breath sounds. No stridor. No wheezing or rales. Chest:      Chest wall: No tenderness. Abdominal:      General: Bowel sounds are normal.      Palpations: Abdomen is soft. Tenderness: There is no abdominal tenderness. There is no guarding or rebound. Musculoskeletal:         General: No tenderness or deformity. Normal range of motion.       Cervical back: Normal range of motion and neck supple. Right lower leg: No edema. Left lower leg: No edema. Skin:     General: Skin is warm and dry. Capillary Refill: Capillary refill takes less than 2 seconds. Neurological:      General: No focal deficit present. Mental Status: He is alert. Motor: No tremor or abnormal muscle tone. Comments: Oriented to self and somewhat to situation. Cooperative. Unknown baseline at this time.          Vital Signs  ED Triage Vitals   Temperature Pulse Respirations Blood Pressure SpO2   07/09/23 0054 07/09/23 0047 07/09/23 0047 07/09/23 0047 07/09/23 0047   98.5 °F (36.9 °C) 97 16 129/67 97 %      Temp Source Heart Rate Source Patient Position - Orthostatic VS BP Location FiO2 (%)   07/09/23 0054 07/09/23 0047 07/09/23 0047 07/09/23 0047 --   Oral Monitor Sitting Right arm       Pain Score       --                  Vitals:    07/09/23 0047 07/09/23 0645   BP: 129/67 141/65   Pulse: 97 78   Patient Position - Orthostatic VS: Sitting Sitting         Visual Acuity      ED Medications  Medications   ceftriaxone (ROCEPHIN) 1 g/50 mL in dextrose IVPB (has no administration in time range)   OLANZapine (ZyPREXA) IM injection 10 mg (10 mg Intramuscular Given 7/9/23 0642)   sterile water injection **ADS Override Pull** (2.1 mL  Given 7/9/23 0643)       Diagnostic Studies  Results Reviewed     Procedure Component Value Units Date/Time    HS Troponin I 4hr [[id number]]  (Normal) Collected: 07/09/23 0754    Lab Status: Final result Specimen: Blood from Arm, Left Updated: 07/09/23 0823     hs TnI 4hr 23 ng/L      Delta 4hr hsTnI 12 ng/L     HS Troponin I 2hr [[id number]]  (Normal) Collected: 07/09/23 0516    Lab Status: Final result Specimen: Blood from Arm, Left Updated: 07/09/23 0549     hs TnI 2hr 16 ng/L      Delta 2hr hsTnI 5 ng/L     UA (URINE) with reflex to Scope [[id number]] Collected: 07/09/23 0318    Lab Status: Final result Specimen: Urine, Clean Catch Updated: 07/09/23 0357     Color, UA Light Yellow     Clarity, UA Clear     Specific Gravity, UA 1.010     pH, UA 6.5     Leukocytes, UA Negative     Nitrite, UA Negative     Protein, UA Negative mg/dl      Glucose, UA Negative mg/dl      Ketones, UA Negative mg/dl      Urobilinogen, UA <2.0 mg/dl      Bilirubin, UA Negative     Occult Blood, UA Negative    Rapid drug screen, urine [[id number]]  (Normal) Collected: 07/09/23 0318    Lab Status: Final result Specimen: Urine, Clean Catch Updated: 07/09/23 0340     Amph/Meth UR Negative     Barbiturate Ur Negative     Benzodiazepine Urine Negative     Cocaine Urine Negative     Methadone Urine Negative     Opiate Urine Negative     PCP Ur Negative     THC Urine Negative     Oxycodone Urine Negative    Narrative:      FOR MEDICAL PURPOSES ONLY. IF CONFIRMATION NEEDED PLEASE CONTACT THE LAB WITHIN 5 DAYS.     Drug Screen Cutoff Levels:  AMPHETAMINE/METHAMPHETAMINES  1000 ng/mL  BARBITURATES     200 ng/mL  BENZODIAZEPINES     200 ng/mL  COCAINE      300 ng/mL  METHADONE      300 ng/mL  OPIATES      300 ng/mL  PHENCYCLIDINE     25 ng/mL  THC       50 ng/mL  OXYCODONE      100 ng/mL    TSH, 3rd generation with Free T4 reflex [[id number]]  (Normal) Collected: 07/09/23 0200    Lab Status: Final result Specimen: Blood from Arm, Left Updated: 07/09/23 0250     TSH 3RD GENERATON 8.922 uIU/mL     Salicylate level [[id number]]  (Normal) Collected: 07/09/23 0200    Lab Status: Final result Specimen: Blood from Arm, Left Updated: 96/59/55 0537     Salicylate Lvl <5 mg/dL     Comprehensive metabolic panel [[id number]]  (Abnormal) Collected: 07/09/23 0200    Lab Status: Final result Specimen: Blood from Arm, Left Updated: 07/09/23 0244     Sodium 139 mmol/L      Potassium 3.8 mmol/L      Chloride 104 mmol/L      CO2 25 mmol/L      ANION GAP 10 mmol/L      BUN 9 mg/dL      Creatinine 0.89 mg/dL      Glucose 102 mg/dL      Calcium 9.4 mg/dL      AST 19 U/L      ALT 14 U/L      Alkaline Phosphatase 63 U/L Total Protein 6.3 g/dL      Albumin 4.1 g/dL      Total Bilirubin 0.43 mg/dL      eGFR 74 ml/min/1.73sq m     Narrative:      [place] guidelines for Chronic Kidney Disease (CKD):   •  Stage 1 with normal or high GFR (GFR > 90 mL/min/1.73 square meters)  •  Stage 2 Mild CKD (GFR = 60-89 mL/min/1.73 square meters)  •  Stage 3A Moderate CKD (GFR = 45-59 mL/min/1.73 square meters)  •  Stage 3B Moderate CKD (GFR = 30-44 mL/min/1.73 square meters)  •  Stage 4 Severe CKD (GFR = 15-29 mL/min/1.73 square meters)  •  Stage 5 End Stage CKD (GFR <15 mL/min/1.73 square meters)  Note: GFR calculation is accurate only with a steady state creatinine    Acetaminophen level-If concentration is detectable, please discuss with medical  on call.  [[id number]]  (Abnormal) Collected: 07/09/23 0200    Lab Status: Final result Specimen: Blood from Arm, Left Updated: 07/09/23 0244     Acetaminophen Level <10 ug/mL     HS Troponin 0hr (reflex protocol) [[id number]]  (Normal) Collected: 07/09/23 0200    Lab Status: Final result Specimen: Blood from Arm, Left Updated: 07/09/23 0241     hs TnI 0hr 11 ng/L     Ammonia [[id number]]  (Normal) Collected: 07/09/23 0200    Lab Status: Final result Specimen: Blood from Arm, Left Updated: 07/09/23 0232     Ammonia 36 umol/L     Ethanol [[id number]]  (Normal) Collected: 07/09/23 0200    Lab Status: Final result Specimen: Blood from Arm, Left Updated: 07/09/23 0232     Ethanol Lvl <10 mg/dL     CBC and differential [[id number]]  (Abnormal) Collected: 07/09/23 0200    Lab Status: Final result Specimen: Blood from Arm, Left Updated: 07/09/23 0211     WBC 6.28 Thousand/uL      RBC 4.14 Million/uL      Hemoglobin 13.3 g/dL      Hematocrit 39.6 %      MCV 96 fL      MCH 32.1 pg      MCHC 33.6 g/dL      RDW 13.0 %      MPV 9.4 fL      Platelets 279 Thousands/uL      nRBC 0 /100 WBCs      Neutrophils Relative 68 %      Immat GRANS % 0 %      Lymphocytes Relative 15 % Monocytes Relative 14 %      Eosinophils Relative 2 %      Basophils Relative 1 %      Neutrophils Absolute 4.31 Thousands/µL      Immature Grans Absolute 0.02 Thousand/uL      Lymphocytes Absolute 0.91 Thousands/µL      Monocytes Absolute 0.85 Thousand/µL      Eosinophils Absolute 0.15 Thousand/µL      Basophils Absolute 0.04 Thousands/µL     Fingerstick Glucose (POCT) [[id number]]  (Normal) Collected: 07/09/23 0155    Lab Status: Final result Updated: 07/09/23 0156     POC Glucose 108 mg/dl                  CT head without contrast   Final Result by Shyann Bruner MD (07/09 0309)      Moderately degraded study by patient motion demonstrates no gross evidence of acute hemorrhage or ischemia. However, if there is continued clinical concern, a repeat CT brain should be considered. Mild chronic small vessel ischemic changes. Workstation performed: [id number]         XR chest 1 view portable    (Results Pending)              Procedures  Procedures         ED Course  ED Course as of 07/09/23 0847   [facility] Jul 09, 2023   360 80-year-old male sent in by facility for behavioral abnormality evaluation. Vital signs on arrival within normal limits. Physical exam remarkable for a disoriented, pleasant, cooperative gentleman with no focal neurologic deficits. Differential diagnosis includes infection/sepsis from various sources including chest/urine, metabolic disturbance, electrolyte imbalance, intoxication, withdrawal, CVA or intracranial bleeding. Lab work and imaging was ordered. 0134 ECG 12 lead  Normal sinus rhythm 83, , QTc 462, no ST elevation or depression, no ectopy. Right bundle noted. Impression: No STEMI.   0215 POC Glucose: 108   0356 CT head without contrast  Moderately degraded study by patient motion demonstrates no gross evidence of acute hemorrhage or ischemia.   However, if there is continued clinical concern, a repeat CT brain should be considered.     Mild chronic small vessel ischemic changes. 4235 Unable to reach [address] [place] [address] in terms of further evaluation of patient's apparent psychosis episode. Patient remains calm, cooperative, pleasant in the emergency department. 0402 hs TnI 0hr: 11  2-hour troponin is pending at this time. 0403 CBC, CMP, TSH, coma panel, ammonia, UDS, UA within normal limits. 0405 Attempted to reach out to [employer] multiple times at listed phone number and was unable to reach anyone. 0408 XR chest 1 view portable  Right-sided consolidation noted. [id number] Patient does not have cough, fever, SPO2 97% on room air. However when patient speaks, he speaks slowly in several word sentences, appears very mildly short of breath. Will elect to treat for possible pneumonia with p.o. antibiotics. 0556 Delta 2hr hsTnI: 5  Pending 4-hour troponin. 0842 hs TnI 4hr: 23   0842 Delta 4hr hsTnI: 12   0842 4-hour troponin shows increasing elevation. This was discussed with general medicine for admission. Patient refused to take p.o. medication. 1 dose of IV ceftriaxone was ordered in regards to patient's shortness of breath and chest x-ray finding.                                              MDM    Disposition  Final diagnoses:   Behavioral change   Middle lobe consolidation ([address])   Elevated troponin     Time reflects when diagnosis was documented in both MDM as applicable and the Disposition within this note     Time User Action Codes Description Comment    7/9/2023  4:10 AM Merlynn Tonia Add [R40.4] Transient alteration of awareness     7/9/2023  4:10 AM Merlynn Tonia Remove [R40.4] Transient alteration of awareness     7/9/2023  4:11 AM Merlynn Tonia Add [R46.89] Behavioral change     7/9/2023  4:12 AM Merlynn Tonia Add [J18.1] Middle lobe consolidation ([address])     7/9/2023  8:46 AM Merlynn Tonia Add [R77.8] Elevated troponin       ED Disposition     ED Disposition   Admit    Condition   Stable    Date/Time   Sun Jul 9, 2023  8:46 AM    Comment   Case was discussed with Dr. Corrie Rodriguez and the patient's admission status was agreed to be Admission Status: inpatient status to the service of Dr. Corrie Rodriguez . Follow-up Information     Follow up With Specialties Details Why Contact Info    Blayne Burkett MD Family Medicine, Geriatric Medicine In 1 day For reevaluation [address] [place] [address]  [address] [address]  [phone]            Patient's Medications   Discharge Prescriptions    AZITHROMYCIN (ZITHROMAX) 250 MG TABLET    1 tablet daily x 4 days starting on 7/10 Do not start before July 10, 2023. Start Date: 7/10/2023 End Date: 7/13/2023       Order Dose: --       Quantity: 4 tablet    Refills: 0       No discharge procedures on file.     PDMP Review       Value Time User    PDMP Reviewed  Yes 6/15/2023  1:47 PM dwight Lopez, [place]          ED Provider  Electronically Signed by           Josseline Bermudez MD  07/09/23 2040

## 2023-07-10 LAB
ANION GAP SERPL CALCULATED.3IONS-SCNC: 8 MMOL/L
ATRIAL RATE: 60 BPM
ATRIAL RATE: 83 BPM
BUN SERPL-MCNC: 9 MG/DL (ref 5–25)
CALCIUM SERPL-MCNC: 9.3 MG/DL (ref 8.4–10.2)
CHLORIDE SERPL-SCNC: 107 MMOL/L (ref 96–108)
CO2 SERPL-SCNC: 29 MMOL/L (ref 21–32)
CREAT SERPL-MCNC: 0.86 MG/DL (ref 0.6–1.3)
FOLATE SERPL-MCNC: 6.6 NG/ML
GFR SERPL CREATININE-BSD FRML MDRD: 75 ML/MIN/1.73SQ M
GLUCOSE SERPL-MCNC: 88 MG/DL (ref 65–140)
P AXIS: 50 DEGREES
P AXIS: 64 DEGREES
POTASSIUM SERPL-SCNC: 3.7 MMOL/L (ref 3.5–5.3)
PR INTERVAL: 194 MS
PR INTERVAL: 202 MS
QRS AXIS: -63 DEGREES
QRS AXIS: -65 DEGREES
QRSD INTERVAL: 130 MS
QRSD INTERVAL: 134 MS
QT INTERVAL: 394 MS
QT INTERVAL: 444 MS
QTC INTERVAL: 444 MS
QTC INTERVAL: 462 MS
SODIUM SERPL-SCNC: 144 MMOL/L (ref 135–147)
T WAVE AXIS: 61 DEGREES
T WAVE AXIS: 68 DEGREES
VENTRICULAR RATE: 60 BPM
VENTRICULAR RATE: 83 BPM
VIT B12 SERPL-MCNC: 174 PG/ML (ref 180–914)

## 2023-07-10 PROCEDURE — 82746 ASSAY OF FOLIC ACID SERUM: CPT

## 2023-07-10 PROCEDURE — 93010 ELECTROCARDIOGRAM REPORT: CPT | Performed by: INTERNAL MEDICINE

## 2023-07-10 PROCEDURE — 80048 BASIC METABOLIC PNL TOTAL CA: CPT

## 2023-07-10 PROCEDURE — 99232 SBSQ HOSP IP/OBS MODERATE 35: CPT | Performed by: INTERNAL MEDICINE

## 2023-07-10 PROCEDURE — 99223 1ST HOSP IP/OBS HIGH 75: CPT | Performed by: NURSE PRACTITIONER

## 2023-07-10 PROCEDURE — 82607 VITAMIN B-12: CPT | Performed by: INTERNAL MEDICINE

## 2023-07-10 RX ORDER — LORAZEPAM 0.5 MG/1
0.5 TABLET ORAL
Status: DISCONTINUED | OUTPATIENT
Start: 2023-07-10 | End: 2023-07-12 | Stop reason: HOSPADM

## 2023-07-10 RX ADMIN — ESCITALOPRAM OXALATE 10 MG: 10 TABLET ORAL at 08:41

## 2023-07-10 RX ADMIN — BUSPIRONE HYDROCHLORIDE 10 MG: 5 TABLET ORAL at 20:49

## 2023-07-10 RX ADMIN — ENOXAPARIN SODIUM 40 MG: 40 INJECTION SUBCUTANEOUS at 08:41

## 2023-07-10 RX ADMIN — LORAZEPAM 0.5 MG: 0.5 TABLET ORAL at 08:41

## 2023-07-10 RX ADMIN — FAMOTIDINE 20 MG: 20 TABLET ORAL at 08:41

## 2023-07-10 RX ADMIN — BUSPIRONE HYDROCHLORIDE 10 MG: 5 TABLET ORAL at 08:41

## 2023-07-10 RX ADMIN — BUPROPION HYDROCHLORIDE 100 MG: 100 TABLET, FILM COATED ORAL at 08:41

## 2023-07-10 NOTE — ASSESSMENT & PLAN NOTE
· Patient was experiencing episodes of cough and epigastric pain in the past.  · Currently denying any cough or abdominal pain  · Continue with famotidine 20 mg daily  · Dysphagia 1-pureed diet in place

## 2023-07-10 NOTE — ASSESSMENT & PLAN NOTE
· Will hold wellbutrin as he had never officially started this medication  · Continue with BuSpar 10 milligrams twice daily  · Lexapro 10 milligrams daily  · lorazepam 0.5 mg PRN at nighttime rather than scheduled daily   · Gerontology consulted for recommendations  · Ordered psych consult to help with medication decisions

## 2023-07-10 NOTE — CONSULTS
Consultation - Geriatric Medicine   [facility] 80 y.o. male MRN: [id number]  Unit/Bed#: S -01 Encounter: [id number]      Assessment/Plan   Moderate vascular dementia with psychotic disturbance  · Known history- lives at [facility]   · Takes BuSpar 10 mg twice daily, lorazepam 0.5 mg daily, Lexapro 10 mg daily and was recently restarted on Wellbutrin  · On 6/8/23 patient was restarted on Wellbutrin due to changes in behavior which were similar to how he acted previously when depressed   · If continued issues with behaviors could consider starting Depakote 125 mg nightly-need to monitor liver enzymes while on this  · Most recent qtc 444 from EKG completed yesterday   · [place] completed today was a 12 out of 30  Patient is alert oriented to person, place, partial time-disoriented to situation  At risk for delirium due to cognitive impairment  Recommend delirium precautions  Maintain sleep-wake cycle, avoid nighttime interruptions  minimize overnight interruptions, group overnight vitals/labs/nursing checks as possible  dim lights, close blinds and turn off tv to minimize stimulation and encourage sleep environment in evenings  Provide adequate pain control  Avoid urinary retention and constipation  Provide frequent and early mobilization  Provide frequent redirection and reorientation as needed  Avoid medications that may worsen or precipitate delirium such as tramadol, benzodiazepines, anticholinergics, and Benadryl  Redirect unwanted behaviors as first-line therapy, avoid physical restraints as able to  · Continue to monitor    GERD  · On famotidine 20mg daily    Constipation  Patient complains of constipation  Last BM was prior to hospitlization  Is currently on colace 100mg bid  Encourage adequate p.o.  Hydration  Encourage prune juice as tolerated    Anxiety with depression  · Chronic   · Currently on buspar 10 mg twice daily, Lexapro 10 mg daily, lorazepam 0.5 mg daily, and Wellbutrin 100 mg twice daily  · Admits to some anxiety and depression  · Denies SI/HI  · Psych was consulted appreciate recommendations    Insomnia  First-line treatment is behavior modification  Maintain sleep-wake cycle, avoid nighttime interruptions  Avoid caffeine throughout the day  Avoid napping throughout the day  Encourage physical activity throughout the day  · Avoid sedative hypnotics including benzodiazepines and benadryl    Vision impairment  Patient does have vision impairment  Wears glasses at a baseline   Recommend use of corrective lenses at all appropriate times  Encourage adequate lighting and encourage use of assistance with ambulation  Keep personal belongings close to avoid reaching  Encourage appropriate footwear at all times  Recommend large font for printed materials provided to patient    Frailty  Clinical Frail Scale: Living with moderate frailty  Total protein 6.3 and albumin 4.1  Encourage adequate hydration and nutrition, including protein in meals  OOB as tolerated  PT/OT consulted  · Encourage early and frequent mobilization    Ambulatory dysfunction  At a baseline ambulates without AD  PT/OT following  Fall precautions  Out of bed as tolerated  Encourage early and frequent mobilization  Encourage adequate hydration and nutrition  Provide adequate pain management   · Goal is to return to [facility]   · continue with PT/OT for continued strength and balance training       Home medication review  BuSpar 10 mg twice daily  Colace 100 mg daily  Lexapro 20 mg daily?   Tylenol 650 every 6 as needed  Famotidine 40 mg daily  Lorazepam 0.5 mg daily    Personally confirmed with medication list faxed from [facility]    History of Present Illness   Physician Requesting Consult: Jung Preciado DO  Reason for Consult / Principal Problem: behavioral change and polypharmacy  Hx and PE limited by: dementia  Additional history obtained from: Chart review and patient evaluation      HPI: Faye Marianne is a 80 y.o. year old male who presents with history of dementia, GERD, insomnia, anxiety, depression, ambulatory dysfunction. He presented from [address] [place] [address] with behavioral changes-has been more anxious and confused with some psychotic episodes over the last month. He has had some aggression towards staff, refusing care, and frequently walking naked outside the room. He lives at [address] [place] [address]. He ambulates without any assistive devices. He denies any falls. He reports he is independent for ADLs. There are grab bars and showers in the bathrooms. He reports he is continent of both bowel and bladder. He wears eyeglasses and dentures, no hearing aids. The facility manages the cooking, cleaning, and medications. His daughter manages the finances. He denies any issues with insomnia. Reports some issues with anxiety and depression. Appetite has been stable, denies any appetite loss or weight loss. He does not drive. His memory has been declining. Upon examination patient was out of bed in chair, resting. He appeared comfortable and was in no acute distress. He is slightly anxious on exam regarding the hospitalization and wanted to go back to facility. His appetite is stable, ate 100% of his lunch. Denies any pain. Per nursing no other acute concerns or issues at this time. Inpatient consult to Gerontology  Consult performed by: PREETI Ledezma  Consult ordered by: Jessee Soto MD          Review of Systems   Reason unable to perform ROS: Limited by dementia. Constitutional: Negative for activity change, appetite change and fatigue. Respiratory: Negative for cough and shortness of breath. Cardiovascular: Negative for chest pain. Gastrointestinal: Negative for constipation and diarrhea. Musculoskeletal: Positive for gait problem. Negative for arthralgias and back pain. Neurological: Positive for weakness. Negative for dizziness, light-headedness and headaches.    Psychiatric/Behavioral: Positive for confusion and dysphoric mood. Negative for behavioral problems, self-injury, sleep disturbance and suicidal ideas. The patient is nervous/anxious. Historical Information   Past Medical History:   Diagnosis Date   • Insomnia 9/9/2019     History reviewed. No pertinent surgical history. Social History   Social History     Substance and Sexual Activity   Alcohol Use None     Social History     Substance and Sexual Activity   Drug Use Never     Social History     Tobacco Use   Smoking Status Former   • Types: Cigarettes   Smokeless Tobacco Never     Family History:   Family History   Family history unknown: Yes       Meds/Allergies   all current active meds have been reviewed    Allergies   Allergen Reactions   • Bee Venom        Objective   No intake or output data in the 24 hours ending 07/10/23 1524  Invasive Devices     Peripheral Intravenous Line  Duration           Peripheral IV 07/09/23 Right;Ventral (anterior) Forearm 1 day                Physical Exam  Vitals reviewed. Constitutional:       General: He is not in acute distress. Appearance: He is well-developed. HENT:      Head: Normocephalic and atraumatic. Cardiovascular:      Rate and Rhythm: Normal rate and regular rhythm. Heart sounds: No murmur heard. Pulmonary:      Effort: Pulmonary effort is normal. No respiratory distress. Breath sounds: Normal breath sounds. Abdominal:      General: Bowel sounds are normal. There is no distension. Palpations: Abdomen is soft. Tenderness: There is no abdominal tenderness. Musculoskeletal:         General: No swelling. Right lower leg: No edema. Left lower leg: No edema. Skin:     General: Skin is warm and dry. Coloration: Skin is pale. Neurological:      General: No focal deficit present. Mental Status: He is alert. Mental status is at baseline. Cranial Nerves: No cranial nerve deficit. Motor: Weakness present.       Gait: Gait abnormal.      Comments: Alert to person, place, and partial time-disoriented to situation   Psychiatric:         Mood and Affect: Mood normal.         Lab Results:   I have personally reviewed pertinent lab results including the following:  -CBC, CMP, troponin, ammonia, procalcitonin, UA, rapid drug screen, vitamin B12, and folate    I have personally reviewed the following imaging study reports in PACS:  -Chest x-ray and CT head    Therapies:   PT: consulted  OT: consulted    VTE Prophylaxis: Sequential compression device (Venodyne)  and Enoxaparin (Lovenox)    Code Status: Level 1 - Full Code    Family and Social Support:   Living Arrangements: Lives alone at [address]: [facility]  Assistance Needed: [place]  Type of Current Residence: Assistive living     Goals of Care: [facility]    Please note:  Voice-recognition software may have been used in the preparation of this document. Occasional wrong word or "sound-alike" substitutions may have occurred due to the inherent limitations of voice recognition software. Interpretation should be guided by context.

## 2023-07-10 NOTE — PLAN OF CARE
Problem: MOBILITY - ADULT  Goal: Maintain or return to baseline ADL function  Description: INTERVENTIONS:  -  Assess patient's ability to carry out ADLs; assess patient's baseline for ADL function and identify physical deficits which impact ability to perform ADLs (bathing, care of mouth/teeth, toileting, grooming, dressing, etc.)  - Assess/evaluate cause of self-care deficits   - Assess range of motion  - Assess patient's mobility; develop plan if impaired  - Assess patient's need for assistive devices and provide as appropriate  - Encourage maximum independence but intervene and supervise when necessary  - Involve family in performance of ADLs  - Assess for home care needs following discharge   - Consider OT consult to assist with ADL evaluation and planning for discharge  - Provide patient education as appropriate  Outcome: Progressing  Goal: Maintains/Returns to pre admission functional level  Description: INTERVENTIONS:  - Perform BMAT or MOVE assessment daily.   - Set and communicate daily mobility goal to care team and patient/family/caregiver. - Collaborate with rehabilitation services on mobility goals if consulted  - Perform Range of Motion times a day. - Reposition patient every  hours.   - Dangle patient  times a day  - Stand patient  times a day  - Ambulate patient times a day  - Out of bed to chair  times a day   - Out of bed for meals  times a day  - Out of bed for toileting  - Record patient progress and toleration of activity level   Outcome: Progressing

## 2023-07-10 NOTE — PLAN OF CARE
Problem: MOBILITY - ADULT  Goal: Maintain or return to baseline ADL function  Description: INTERVENTIONS:  -  Assess patient's ability to carry out ADLs; assess patient's baseline for ADL function and identify physical deficits which impact ability to perform ADLs (bathing, care of mouth/teeth, toileting, grooming, dressing, etc.)  - Assess/evaluate cause of self-care deficits   - Assess range of motion  - Assess patient's mobility; develop plan if impaired  - Assess patient's need for assistive devices and provide as appropriate  - Encourage maximum independence but intervene and supervise when necessary  - Involve family in performance of ADLs  - Assess for home care needs following discharge   - Consider OT consult to assist with ADL evaluation and planning for discharge  - Provide patient education as appropriate  Outcome: Progressing  Goal: Maintains/Returns to pre admission functional level  Description: INTERVENTIONS:  - Perform BMAT or MOVE assessment daily.   - Set and communicate daily mobility goal to care team and patient/family/caregiver. - Collaborate with rehabilitation services on mobility goals if consulted  - Perform Range of Motion 3 times a day. - Reposition patient every 2 hours.   - Dangle patient 3 times a day  - Stand patient 3 times a day  - Ambulate patient 3 times a day  - Out of bed to chair 3 times a day   - Out of bed for meals 3 times a day  - Out of bed for toileting  - Record patient progress and toleration of activity level   Outcome: Progressing     Problem: PAIN - ADULT  Goal: Verbalizes/displays adequate comfort level or baseline comfort level  Description: Interventions:  - Encourage patient to monitor pain and request assistance  - Assess pain using appropriate pain scale  - Administer analgesics based on type and severity of pain and evaluate response  - Implement non-pharmacological measures as appropriate and evaluate response  - Consider cultural and social influences on pain and pain management  - Notify physician/advanced practitioner if interventions unsuccessful or patient reports new pain  Outcome: Progressing     Problem: INFECTION - ADULT  Goal: Absence or prevention of progression during hospitalization  Description: INTERVENTIONS:  - Assess and monitor for signs and symptoms of infection  - Monitor lab/diagnostic results  - Monitor all insertion sites, i.e. indwelling lines, tubes, and drains  - Monitor endotracheal if appropriate and nasal secretions for changes in amount and color  - Alna appropriate cooling/warming therapies per order  - Administer medications as ordered  - Instruct and encourage patient and family to use good hand hygiene technique  - Identify and instruct in appropriate isolation precautions for identified infection/condition  Outcome: Progressing  Goal: Absence of fever/infection during neutropenic period  Description: INTERVENTIONS:  - Monitor WBC    Outcome: Progressing     Problem: SAFETY ADULT  Goal: Maintain or return to baseline ADL function  Description: INTERVENTIONS:  -  Assess patient's ability to carry out ADLs; assess patient's baseline for ADL function and identify physical deficits which impact ability to perform ADLs (bathing, care of mouth/teeth, toileting, grooming, dressing, etc.)  - Assess/evaluate cause of self-care deficits   - Assess range of motion  - Assess patient's mobility; develop plan if impaired  - Assess patient's need for assistive devices and provide as appropriate  - Encourage maximum independence but intervene and supervise when necessary  - Involve family in performance of ADLs  - Assess for home care needs following discharge   - Consider OT consult to assist with ADL evaluation and planning for discharge  - Provide patient education as appropriate  Outcome: Progressing  Goal: Maintains/Returns to pre admission functional level  Description: INTERVENTIONS:  - Perform BMAT or MOVE assessment daily.   - Set and communicate daily mobility goal to care team and patient/family/caregiver. - Collaborate with rehabilitation services on mobility goals if consulted  - Perform Range of Motion 3 times a day. - Reposition patient every 2 hours.   - Dangle patient 3 times a day  - Stand patient 3 times a day  - Ambulate patient 3 times a day  - Out of bed to chair 3 times a day   - Out of bed for meals 3 times a day  - Out of bed for toileting  - Record patient progress and toleration of activity level   Outcome: Progressing  Goal: Patient will remain free of falls  Description: INTERVENTIONS:  - Educate patient/family on patient safety including physical limitations  - Instruct patient to call for assistance with activity   - Consult OT/PT to assist with strengthening/mobility   - Keep Call bell within reach  - Keep bed low and locked with side rails adjusted as appropriate  - Keep care items and personal belongings within reach  - Initiate and maintain comfort rounds  - Make Fall Risk Sign visible to staff  - Offer Toileting every 2 Hours, in advance of need  - Initiate/Maintain bed alarm  - Obtain necessary fall risk management equipment: alarms, call bell in reach, declutter room, assistive device in reach  - Apply yellow socks and bracelet for high fall risk patients  - Consider moving patient to room near nurses station  Outcome: Progressing     Problem: DISCHARGE PLANNING  Goal: Discharge to home or other facility with appropriate resources  Description: INTERVENTIONS:  - Identify barriers to discharge w/patient and caregiver  - Arrange for needed discharge resources and transportation as appropriate  - Identify discharge learning needs (meds, wound care, etc.)  - Arrange for interpretive services to assist at discharge as needed  - Refer to Case Management Department for coordinating discharge planning if the patient needs post-hospital services based on physician/advanced practitioner order or complex needs related to functional status, cognitive ability, or social support system  Outcome: Progressing     Problem: Knowledge Deficit  Goal: Patient/family/caregiver demonstrates understanding of disease process, treatment plan, medications, and discharge instructions  Description: Complete learning assessment and assess knowledge base.   Interventions:  - Provide teaching at level of understanding  - Provide teaching via preferred learning methods  Outcome: Progressing     Problem: Prexisting or High Potential for Compromised Skin Integrity  Goal: Skin integrity is maintained or improved  Description: INTERVENTIONS:  - Identify patients at risk for skin breakdown  - Assess and monitor skin integrity  - Assess and monitor nutrition and hydration status  - Monitor labs   - Assess for incontinence   - Turn and reposition patient  - Assist with mobility/ambulation  - Relieve pressure over bony prominences  - Avoid friction and shearing  - Provide appropriate hygiene as needed including keeping skin clean and dry  - Evaluate need for skin moisturizer/barrier cream  - Collaborate with interdisciplinary team   - Patient/family teaching  - Consider wound care consult   Outcome: Progressing

## 2023-07-10 NOTE — PROGRESS NOTES
[facility]  Progress Note  Name: Darin Crespo  MRN: [id number]  Unit/Bed#: S -[facility] I Date of Admission: 7/9/2023   Date of Service: 7/10/2023 I Hospital Day: 1    Assessment/Plan   * Moderate vascular dementia with psychotic disturbance [facility])  Assessment & Plan  · Presents on admission due to changes in behavior that happened lately. Patient coming from [facility] for a care facility. Patient aggressive to staff, refusing care, walking naked outside the room. · Patient with history of major depressive disorder after spouse passed, has been on BuSpar 10 mg 2 times a day, Lexapro 10 mg daily and lorazepam 0.5 mg daily. On admission no concern for acute infection, lab work as well as chest x-ray no concern for pneumonia, UTI.  · S/p 1 dose of c Zyprexa IV 10 mg, QTc normal  · Patient scored a 12/30 on my MOCA today indicating moderate impairment  · BMP unremarkable  · Vitamin B12 low normal  · Folate normal  Progression of the dementia versus polypharmacy    Plan:  · Gerontology consulted, concern for polypharmacy  · Psych consult  · Delirium precautions   · Minimize sleep disturbances. · Patient's family (daughter Kumar Tabor) agreeable to talk to the patient at any time if the patient experiences delirium episodes and would appreciate updates daily.   · Zyprexa 2.5 mg IV as needed for severe delusions and psychosis      Anxiety with depression  Assessment & Plan    · Will hold wellbutrin as he had never officially started this medication  · Continue with BuSpar 10 milligrams twice daily  · Lexapro 10 milligrams daily  · lorazepam 0.5 mg PRN at nighttime rather than scheduled daily   · Gerontology consulted for recommendations  · Ordered psych consult to help with medication decisions    Gastroesophageal reflux disease without esophagitis  Assessment & Plan  · Patient was experiencing episodes of cough and epigastric pain in the past.  · Currently denying any cough or abdominal pain  · Continue with famotidine 20 mg daily  · Dysphagia 1-pureed diet in place    Slow transit constipation  Assessment & Plan  Continue with Colace 100 mg daily          VTE Pharmacologic Prophylaxis: VTE Score: 4 Moderate Risk (Score 3-4) - Pharmacological DVT Prophylaxis Ordered: enoxaparin (Lovenox). Patient Centered Rounds: I performed bedside rounds with nursing staff today. Discussions with Specialists or Other Care Team Provider: Geronotology    Education and Discussions with Family / Patient: Updated  (daughter) via phone. Current Length of Stay: 1 day(s)  Current Patient Status: Inpatient   Discharge Plan: Anticipate discharge in >72 hrs to rehab facility. Code Status: Level 1 - Full Code    Subjective:   Patient examined at bedside. When asked how he felt this morning, he responds, " do not feel well, but it does not matter because I have to get up anyway."  When asked if he was in any pain he responds, " not yet."  Did not have any complaints for me today. Objective:     Vitals:   Temp (24hrs), Av.7 °F (36.5 °C), Min:97.5 °F (36.4 °C), Max:97.8 °F (36.6 °C)    Temp:  [97.5 °F (36.4 °C)-97.8 °F (36.6 °C)] 97.8 °F (36.6 °C)  HR:  [60-62] 60  Resp:  [17] 17  BP: (102-167)/(51-78) 167/78  SpO2:  [90 %-94 %] 90 %  There is no height or weight on file to calculate BMI. Input and Output Summary (last 24 hours):   No intake or output data in the 24 hours ending 07/10/23 1328    Physical Exam:   Physical Exam  Constitutional:       General: He is not in acute distress. Appearance: He is normal weight. He is not toxic-appearing. HENT:      Head: Normocephalic. Nose: Nose normal. No congestion or rhinorrhea. Mouth/Throat:      Mouth: Mucous membranes are moist.      Pharynx: Oropharynx is clear. Eyes:      Extraocular Movements: Extraocular movements intact.       Conjunctiva/sclera: Conjunctivae normal.      Pupils: Pupils are equal, round, and reactive to light. Cardiovascular:      Rate and Rhythm: Normal rate and regular rhythm. Pulses: Normal pulses. Heart sounds: Normal heart sounds. No murmur heard. No friction rub. No gallop. Pulmonary:      Effort: Pulmonary effort is normal.      Breath sounds: Normal breath sounds. No wheezing, rhonchi or rales. Chest:      Chest wall: No tenderness. Abdominal:      General: There is no distension. Palpations: Abdomen is soft. Tenderness: There is no abdominal tenderness. There is no guarding or rebound. Genitourinary:     Penis: Normal.       Comments: No obvious lesions noted. Musculoskeletal:         General: No deformity. Right lower leg: No edema. Left lower leg: No edema. Skin:     General: Skin is warm. Capillary Refill: Capillary refill takes less than 2 seconds. Findings: No lesion or rash. Neurological:      Mental Status: He is alert. He is disoriented. Comments: Oriented to person and place(with hint)  Cranial nerves II-XII grossly intact. No pronator drift  Negative heel-to-shin   Negative finger to nose albeit slow  Sensation intact to bilateral extremities   No asterixis or tremors noted     Psychiatric:      Comments: Unable to determine as I don't have a baseline.             Additional Data:     Labs:  Results from last 7 days   Lab Units 07/09/23  0200   WBC Thousand/uL 6.28   HEMOGLOBIN g/dL 13.3   HEMATOCRIT % 39.6   PLATELETS Thousands/uL 244   NEUTROS PCT % 68   LYMPHS PCT % 15   MONOS PCT % 14*   EOS PCT % 2     Results from last 7 days   Lab Units 07/10/23  0443 07/09/23  0200   SODIUM mmol/L 144 139   POTASSIUM mmol/L 3.7 3.8   CHLORIDE mmol/L 107 104   CO2 mmol/L 29 25   BUN mg/dL 9 9   CREATININE mg/dL 0.86 0.89   ANION GAP mmol/L 8 10   CALCIUM mg/dL 9.3 9.4   ALBUMIN g/dL  --  4.1   TOTAL BILIRUBIN mg/dL  --  0.43   ALK PHOS U/L  --  63   ALT U/L  --  14   AST U/L  --  19   GLUCOSE RANDOM mg/dL 88 102         Results from last 7 days   Lab Units 07/09/23  0155   POC GLUCOSE mg/dl 108         Results from last 7 days   Lab Units 07/09/23  0200   PROCALCITONIN ng/ml <0.05       Lines/Drains:  Invasive Devices     Peripheral Intravenous Line  Duration           Peripheral IV 07/09/23 Right;Ventral (anterior) Forearm 1 day                      Imaging: Reviewed radiology reports from this admission including: CT head and Chest X ray    Recent Cultures (last 7 days):         Last 24 Hours Medication List:   Current Facility-Administered Medications   Medication Dose Route Frequency Provider Last Rate   • acetaminophen  650 mg Oral Q6H PRN Claudeen Harris, MD     • busPIRone  10 mg Oral BID Claudeen Harris, MD     • docusate sodium  100 mg Oral Daily Claudeen Harris, MD     • enoxaparin  40 mg Subcutaneous Daily Claudeen Harris, MD     • escitalopram  10 mg Oral Daily Claudeen Harris, MD     • famotidine  20 mg Oral Daily Claudeen Harris, MD     • fluticasone  1 spray Nasal Daily Claudeen Harris, MD     • LORazepam  0.5 mg Oral HS PRN Valerio Garcia MD     • OLANZapine  2.5 mg Oral Q6H PRN Miriam Li MD          Today, Patient Was Seen By: Jass Cotto MD    **Please Note: This note may have been constructed using a voice recognition system. **

## 2023-07-10 NOTE — ASSESSMENT & PLAN NOTE
· Presents on admission due to changes in behavior that happened lately. Patient coming from [facility] for a care facility. Patient aggressive to staff, refusing care, walking naked outside the room. · Patient with history of major depressive disorder after spouse passed, has been on BuSpar 10 mg 2 times a day, Lexapro 10 mg daily and lorazepam 0.5 mg daily. On admission no concern for acute infection, lab work as well as chest x-ray no concern for pneumonia, UTI.  · S/p 1 dose of c Zyprexa IV 10 mg, QTc normal  · Patient scored a 12/30 on my MOCA today indicating moderate impairment  · BMP unremarkable  · Vitamin B12 low normal  · Folate normal  Progression of the dementia versus polypharmacy    Plan:  · Gerontology consulted, concern for polypharmacy  · Psych consult  · Delirium precautions   · Minimize sleep disturbances. · Patient's family (daughter Kenya Monterroso) agreeable to talk to the patient at any time if the patient experiences delirium episodes and would appreciate updates daily.   · Zyprexa 2.5 mg IV as needed for severe delusions and psychosis

## 2023-07-11 PROBLEM — R54 FRAILTY: Status: ACTIVE | Noted: 2023-07-11

## 2023-07-11 PROCEDURE — 97167 OT EVAL HIGH COMPLEX 60 MIN: CPT

## 2023-07-11 PROCEDURE — 99233 SBSQ HOSP IP/OBS HIGH 50: CPT | Performed by: NURSE PRACTITIONER

## 2023-07-11 PROCEDURE — 99223 1ST HOSP IP/OBS HIGH 75: CPT | Performed by: PSYCHIATRY & NEUROLOGY

## 2023-07-11 PROCEDURE — 99232 SBSQ HOSP IP/OBS MODERATE 35: CPT | Performed by: INTERNAL MEDICINE

## 2023-07-11 PROCEDURE — 97535 SELF CARE MNGMENT TRAINING: CPT

## 2023-07-11 RX ORDER — CYANOCOBALAMIN 1000 UG/ML
1000 INJECTION, SOLUTION INTRAMUSCULAR; SUBCUTANEOUS
Status: DISCONTINUED | OUTPATIENT
Start: 2023-07-11 | End: 2023-07-12 | Stop reason: HOSPADM

## 2023-07-11 RX ORDER — ARIPIPRAZOLE 2 MG/1
2 TABLET ORAL DAILY
Status: DISCONTINUED | OUTPATIENT
Start: 2023-07-11 | End: 2023-07-12 | Stop reason: HOSPADM

## 2023-07-11 RX ADMIN — ARIPIPRAZOLE 2 MG: 2 TABLET ORAL at 16:49

## 2023-07-11 RX ADMIN — BUSPIRONE HYDROCHLORIDE 10 MG: 5 TABLET ORAL at 21:34

## 2023-07-11 RX ADMIN — BUSPIRONE HYDROCHLORIDE 10 MG: 5 TABLET ORAL at 10:39

## 2023-07-11 RX ADMIN — ENOXAPARIN SODIUM 40 MG: 40 INJECTION SUBCUTANEOUS at 10:39

## 2023-07-11 RX ADMIN — FAMOTIDINE 20 MG: 20 TABLET ORAL at 10:39

## 2023-07-11 RX ADMIN — CYANOCOBALAMIN 1000 MCG: 1000 INJECTION, SOLUTION INTRAMUSCULAR; SUBCUTANEOUS at 10:39

## 2023-07-11 RX ADMIN — ESCITALOPRAM OXALATE 10 MG: 10 TABLET ORAL at 10:39

## 2023-07-11 NOTE — ASSESSMENT & PLAN NOTE
· Will hold wellbutrin as he had never officially started this medication  · Continue with BuSpar 10 milligrams twice daily  · Lexapro 10 milligrams daily  · lorazepam 0.5 mg PRN at nighttime rather than scheduled daily   · Gerontology consulted for recommendations  · Psych suggested adding abilify 2 mg QD

## 2023-07-11 NOTE — ASSESSMENT & PLAN NOTE
First-line treatment is behavior modification  · Maintain sleep-wake cycle, avoid nighttime interruptions  · Avoid caffeine throughout the day  · Avoid napping throughout the day  · Encourage physical activity throughout the day  · Avoid sedative hypnotics including benzodiazepines and benadryl

## 2023-07-11 NOTE — ASSESSMENT & PLAN NOTE
• At a baseline ambulates without AD  • PT/OT following  • Fall precautions  • Out of bed as tolerated  • Encourage early and frequent mobilization  • Encourage adequate hydration and nutrition  • Provide adequate pain management   • Goal is to return to [place]   • continue with PT/OT for continued strength and balance training

## 2023-07-11 NOTE — PLAN OF CARE
Problem: MOBILITY - ADULT  Goal: Maintain or return to baseline ADL function  Description: INTERVENTIONS:  -  Assess patient's ability to carry out ADLs; assess patient's baseline for ADL function and identify physical deficits which impact ability to perform ADLs (bathing, care of mouth/teeth, toileting, grooming, dressing, etc.)  - Assess/evaluate cause of self-care deficits   - Assess range of motion  - Assess patient's mobility; develop plan if impaired  - Assess patient's need for assistive devices and provide as appropriate  - Encourage maximum independence but intervene and supervise when necessary  - Involve family in performance of ADLs  - Assess for home care needs following discharge   - Consider OT consult to assist with ADL evaluation and planning for discharge  - Provide patient education as appropriate  Outcome: Progressing  Goal: Maintains/Returns to pre admission functional level  Description: INTERVENTIONS:  - Perform BMAT or MOVE assessment daily.   - Set and communicate daily mobility goal to care team and patient/family/caregiver. - Collaborate with rehabilitation services on mobility goals if consulted  - Perform Range of Motion  times a day. - Reposition patient every  hours.   - Dangle patient  times a day  - Stand patient  times a day  - Ambulate patient  times a day  - Out of bed to chair  times a day   - Out of bed for meals  times a day  - Out of bed for toileting  - Record patient progress and toleration of activity level   Outcome: Progressing     Problem: PAIN - ADULT  Goal: Verbalizes/displays adequate comfort level or baseline comfort level  Description: Interventions:  - Encourage patient to monitor pain and request assistance  - Assess pain using appropriate pain scale  - Administer analgesics based on type and severity of pain and evaluate response  - Implement non-pharmacological measures as appropriate and evaluate response  - Consider cultural and social influences on pain and pain management  - Notify physician/advanced practitioner if interventions unsuccessful or patient reports new pain  Outcome: Progressing     Problem: INFECTION - ADULT  Goal: Absence or prevention of progression during hospitalization  Description: INTERVENTIONS:  - Assess and monitor for signs and symptoms of infection  - Monitor lab/diagnostic results  - Monitor all insertion sites, i.e. indwelling lines, tubes, and drains  - Monitor endotracheal if appropriate and nasal secretions for changes in amount and color  - [place] appropriate cooling/warming therapies per order  - Administer medications as ordered  - Instruct and encourage patient and family to use good hand hygiene technique  - Identify and instruct in appropriate isolation precautions for identified infection/condition  Outcome: Progressing  Goal: Absence of fever/infection during neutropenic period  Description: INTERVENTIONS:  - Monitor WBC    Outcome: Progressing     Problem: SAFETY ADULT  Goal: Maintain or return to baseline ADL function  Description: INTERVENTIONS:  -  Assess patient's ability to carry out ADLs; assess patient's baseline for ADL function and identify physical deficits which impact ability to perform ADLs (bathing, care of mouth/teeth, toileting, grooming, dressing, etc.)  - Assess/evaluate cause of self-care deficits   - Assess range of motion  - Assess patient's mobility; develop plan if impaired  - Assess patient's need for assistive devices and provide as appropriate  - Encourage maximum independence but intervene and supervise when necessary  - Involve family in performance of ADLs  - Assess for home care needs following discharge   - Consider OT consult to assist with ADL evaluation and planning for discharge  - Provide patient education as appropriate  Outcome: Progressing  Goal: Maintains/Returns to pre admission functional level  Description: INTERVENTIONS:  - Perform BMAT or MOVE assessment daily.   - Set and communicate daily mobility goal to care team and patient/family/caregiver. - Collaborate with rehabilitation services on mobility goals if consulted  - Perform Range of Motion  times a day. - Reposition patient every  hours.   - Dangle patient  times a day  - Stand patient  times a day  - Ambulate patient  times a day  - Out of bed to chair  times a day   - Out of bed for meals  times a day  - Out of bed for toileting  - Record patient progress and toleration of activity level   Outcome: Progressing  Goal: Patient will remain free of falls  Description: INTERVENTIONS:  - Educate patient/family on patient safety including physical limitations  - Instruct patient to call for assistance with activity   - Consult OT/PT to assist with strengthening/mobility   - Keep Call bell within reach  - Keep bed low and locked with side rails adjusted as appropriate  - Keep care items and personal belongings within reach  - Initiate and maintain comfort rounds  - Make Fall Risk Sign visible to staff  - Offer Toileting every  Hours, in advance of need  - Initiate/Maintain alarm  - Obtain necessary fall risk management equipment:   - Apply yellow socks and bracelet for high fall risk patients  - Consider moving patient to room near nurses station  Outcome: Progressing     Problem: DISCHARGE PLANNING  Goal: Discharge to home or other facility with appropriate resources  Description: INTERVENTIONS:  - Identify barriers to discharge w/patient and caregiver  - Arrange for needed discharge resources and transportation as appropriate  - Identify discharge learning needs (meds, wound care, etc.)  - Arrange for interpretive services to assist at discharge as needed  - Refer to Case Management Department for coordinating discharge planning if the patient needs post-hospital services based on physician/advanced practitioner order or complex needs related to functional status, cognitive ability, or social support system  Outcome: Progressing Problem: Knowledge Deficit  Goal: Patient/family/caregiver demonstrates understanding of disease process, treatment plan, medications, and discharge instructions  Description: Complete learning assessment and assess knowledge base.   Interventions:  - Provide teaching at level of understanding  - Provide teaching via preferred learning methods  Outcome: Not Progressing     Problem: Prexisting or High Potential for Compromised Skin Integrity  Goal: Skin integrity is maintained or improved  Description: INTERVENTIONS:  - Identify patients at risk for skin breakdown  - Assess and monitor skin integrity  - Assess and monitor nutrition and hydration status  - Monitor labs   - Assess for incontinence   - Turn and reposition patient  - Assist with mobility/ambulation  - Relieve pressure over bony prominences  - Avoid friction and shearing  - Provide appropriate hygiene as needed including keeping skin clean and dry  - Evaluate need for skin moisturizer/barrier cream  - Collaborate with interdisciplinary team   - Patient/family teaching  - Consider wound care consult   Outcome: Progressing

## 2023-07-11 NOTE — OCCUPATIONAL THERAPY NOTE
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 No pertinent surgical history. 07/11/23 1523   OT Last Visit   OT Visit Date 07/11/23   Note Type   Note type Evaluation  (+ treatment [id number])   Pain Assessment   Pain Assessment Tool 0-10   Pain Score No Pain   Effect of Pain on Daily Activities N/A   Patient's Stated Pain Goal No pain   Hospital Pain Intervention(s) Repositioned; Ambulation/increased activity   Multiple Pain Sites No   Restrictions/Precautions   Weight Bearing Precautions Per Order No   Other Precautions Cognitive; Chair Alarm; Bed Alarm; Fall Risk   Home Living   Type of Home SNF  ([facility] resident; [facility])   Home Layout One level;Performs ADLs on one level; Able to live on main level with bedroom/bathroom   Bathroom Shower/Tub Walk-in shower   Bathroom Equipment Grab bars in [address] Walker;Cane   Prior Function   Level of [place] Independent with ADLs; Independent with functional mobility; Needs assistance with IADLS   Lives With Alone   Receives Help From Family;Personal care attendant   IADLs Family/Friend/Other provides transportation   Falls in the last 6 months   (Unknow, pt unable to report d/t cognitive impairments)   Vocational 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 Per CM, pt is (I) c ADLs with SBA for household mobility with use of AD.  Pt reports bathing and dressing independently at baseline. Lifestyle   Autonomy At baseline pt is (I) c ADLs, LISA for household distance with use of AD and (A) for IADLs. Pt is a resident at [address] Rec[employer]rocal Relationships Supportive family   Service to Others Retired   Intrinsic Gratification Pt unable to report d/t cognitive status, will f/u. General   Additional Pertinent History Pt admitted 7/9/2023 with behavioral changes from [address] Family/Caregiver Present No   Subjective   Subjective "I have done some horrible things to people. .."   ADL   Where Assessed Edge of bed   Eating Assistance 5  Supervision/Setup   Grooming Assistance 5  Supervision/Setup   Grooming Deficit Setup; Wash/dry hands; Wash/dry face; Teeth care  (completed seated at EOB with overall fair + balance)   UB Bathing Assistance 4  Minimal Assistance   LB Bathing Assistance 3  Moderate Assistance   UB Dressing Assistance 4  Minimal [place] 3  Moderate Assistance   LB Dressing Deficit Thread RLE into pants; Thread LLE into pants;Pull up over hips;Setup; Requires assistive device for steadying;Supervision/safety; Increased time to complete  ((A) to thread BLE into pants and pull over hips; S to janette pants with increased time with support in stance.)   Toileting Assistance  3  Moderate Assistance   Additional Comments Pt is limited by impaired cognition, decreased safety awareness, decreased activity tolerance, with limited insight into current deficits. Bed Mobility   Supine to Sit 5  Supervision   Additional items Assist x 1; Increased time required;Verbal cues   Additional Comments Pt with overall fair + static/dynamic sitting balance at the EOB during functional tasks. Transfers   Sit to Stand 4  Minimal assistance   Additional items Assist x 1; Increased time required;Verbal cues   Stand to Sit 4  Minimal assistance   Additional items Assist x 1; Increased time required;Verbal cues   Stand pivot 4  Minimal assistance   Additional items Assist x 1; Increased time required;Verbal cues  (HHA)   Additional Comments Verbal cues for optimal hand placement and body mechanics for safe transfers, pt with poor carry over of cues t/o session. Functional Mobility   Functional Mobility 4  Minimal assistance   Additional Comments HHA x 1 for functional household distance with increased time. Cueing for body awareness. Additional items Hand hold assistance   Balance   Static Sitting Fair +   Dynamic Sitting Fair   Static Standing Fair   Dynamic Standing Fair -   Activity Tolerance   Activity Tolerance Patient limited by fatigue;Treatment limited secondary to medical complications (Comment)  (limited by cognitive status)   Medical Staff Made Aware Spoke with SLIM   Nurse Made Aware Spoke with ARTURO DAVE Assessment   RUE Assessment WFL   LUE Assessment   LUE Assessment WFL   Hand Function   Gross Motor Coordination Functional   Fine Motor Coordination Functional   Sensation   Light Touch No apparent deficits   Additional Comments Pt noted with SUSAN PINEDO muscle spasms t/o the session when at rest.   Cognition   Overall Cognitive Status Impaired   Arousal/Participation Responsive; Cooperative   Attention Attends with cues to redirect   Orientation Level Oriented to person;Oriented to place; Disoriented to time;Disoriented to situation   Memory Decreased recall of precautions;Decreased recall of recent events;Decreased short term memory   Following Commands Follows one step commands with increased time or repetition   Comments Pt presents with overall flat affect t/o session. Increased time to communicate wants/needs d/t slow, slurred speech. Limited insight into current deficits and overall safety awareness. Per Geratrics pt scored 12/30 on the [address] [place] [address] (7/11/2023)   Assessment   Limitation Decreased ADL status; Decreased UE strength;Decreased Safe judgement during ADL;Decreased cognition;Decreased endurance;Decreased self-care trans;Decreased high-level ADLs   Prognosis Good   Assessment Patient is a 80 y.o. male seen for OT evaluation and treatment  at [facility] following admission on 7/9/2023  s/p Moderate vascular dementia with psychotic disturbance ([address] Comorbidities and significant PMHx impacting functional performance include: Moderate vascular dementia with psychotic disturbance , anxiety with depression, GERD< falls, R foot drop, insomnia. Patient presents with active orders for OT eval and treat and up and OOB as tolerated . Prior to admission, patient was independent with functional mobility with RW with SBA , independent with ADLS and independent with IADLS. Upon initial evaluation, patient requires min assist for UB ADLs, min assist for LB ADLs, supervision for bed mobility, min assist for transfers and  Min assist for functional mobility household distance with HHA. Based on functional eval, pt presents with intact  attention, impaired  safety awareness, impaired  problem solving skills, and impaired  memory. Occupational performance is affected by the following deficits:  decreased muscular strength , decreased balance , decreased standing tolerance for self care tasks , decreased dynamic balance impacting functional reach, decreased functional reach , decreased activity tolerance , impaired judgement and problem solving , impaired safety awareness , mood limitation, impaired learning ability d/t current cognitive status  and impaired global mental status The AM-PAC & Barthel Index outcome tools were used to assist in determining pt safety w/self care /mobility and appropriate d/c recommendations, see above for score. Personal factors impacting performance include: decreased Assistance needed for ADL/IADLs, Assistance needed for ADLs and functional mobility, High fall risk , decreased insight toward deficits , decreased recall of precautions  and baseline cognitive deficits.  Patient would benefit from OT services within the acute care setting to maximize level of functional independence in the following occupational areas activity engagement , safety procedures , fall prevention , energy conservation , self-care transfers, bed mobility , functional mobility and ADLs. From OT standpoint, recommendation at time of D/C would be return to facility with rehabilitation   Goals   Patient Goals no rehab goals stated at this time d/t cognitive status; decrease burden of care   LTG Time Frame 10-14   Long Term Goal See below   Plan   Treatment Interventions ADL retraining;Functional transfer training;UE strengthening/ROM; Endurance training;Cognitive reorientation;Patient/family training;Equipment evaluation/education; Compensatory technique education; Energy conservation; Activityengagement   Goal Expiration Date 07/21/23   OT Treatment Day 1   OT Frequency 3-5x/wk   Recommendation   OT Discharge Recommendation Return to facility with rehabilitation services   Additional Comments  The patient's raw score on the AM-PAC Daily Activity Inpatient Short Form is 19. A raw score of greater than or equal to 19 suggests the patient may benefit from discharge to home. Please refer to the recommendation of the Occupational Therapist for safe discharge planning.    AM-PAC Daily Activity Inpatient   Lower Body Dressing 3   Bathing 3   Toileting 3   Upper Body Dressing 3   Grooming 3   Eating 4   Daily Activity Raw Score 19   Daily Activity Standardized Score (Calc for Raw Score >=11) 40.22   AM-PAC Applied Cognition Inpatient   Following a Speech/Presentation 1   Understanding Ordinary Conversation 2   Taking Medications 3   Remembering Where Things Are Placed or Put Away 2   Remembering List of 4-5 Errands 2   Taking Care of Complicated Tasks 1   Applied Cognition Raw Score 11   Applied Cognition Standardized Score 27.03   Additional Treatment Session   Start Time 1510   End Time 1523   Treatment Assessment Pt participated in tx session following IE for ADL training and to further challenge activity tolerance. Pt completed standard toilet transfer with SUN + use of R grab bar for controlled ascend/dscend. MODA for posterior mykel care d/t decreased R UE ROM. Cl Ambrosio for steadying while patient pulled underwear up over hips, no LOB noted. Pt ambulated functional household distance with MIN HHA, cueing for safety/body awareness. At the end of the session, pt remained supine in bed, per request with alarm donned and all needs in reach. Pt is continuing to perform below baseline due to the following deficits: decreased balance , decreased standing tolerance for self care tasks , decreased dynamic balance impacting functional reach, decreased functional reach , decreased activity tolerance , impaired safety awareness  and impaired learning ability d/t current cognitive status . Personal factors continuing to impact D/C include: Assistance needed for ADL/IADLs, Assistance needed for ADLs and functional mobility, High fall risk , decreased insight toward deficits , decreased recall of precautions  and baseline cognitive deficits From OT standpoint, patient would benefit from skilled intervention to maximize independence with ADLs and functional mobility. Goals remain appropriate, continue POC. At this time, recommending D/C to: return to facility with rehabilitation   End of Consult   Education Provided Yes   Patient Position at End of Consult Supine;Bed/Chair alarm activated; All needs within reach   Nurse Communication Nurse aware of consult     Goals established on initial evaluation:    Pt will complete UB ADLs Supervision  for increased ADL independence within 10 days. Pt will complete LB ADLs Supervision  for increased ADL independence within 10 days. Pt will complete toileting Supervision  with use of DME for increased ADL independence within 10 days.      Pt will demonstrate proper body mechanics to complete self-care transfers and functional mobility with Supervision and use of LRAD for increased safety and functional independence within 10 days. Pt will demonstrate standing tolerance of 4 min with Supervision and use of LRAD for increased activity tolerance during ADL/IADL tasks within 10 days. Pt will complete bed mobility Mod independent  for increased independence in repositioning, pressure offloading, and managing comfort. Pt will demonstrate proper body mechanics and fall prevention strategies during 100% of tx sessions for increased safety awareness during ADL/IADLs    Pt will demonstrate activity tolerance of 30 min in therapeutic tasks for increased participation in meaningful activities upon D/C. Pt will receive education on use of compensatory memory strategies for increased safety and independent with IADL tasks. Pt will demonstrate OOB sitting tolerance of 2-4 hr/day for increased activity tolerance and engagement in leisure activities within 10 days.        Ginette Miles, [id number] [facility] OTR/L   [place] Licensure# [id number]

## 2023-07-11 NOTE — ASSESSMENT & PLAN NOTE
· Presents on admission due to changes in behavior that happened lately. Patient coming from [facility] for a care facility. Patient aggressive to staff, refusing care, walking naked outside the room. · Patient with history of major depressive disorder after spouse passed, has been on BuSpar 10 mg 2 times a day, Lexapro 10 mg daily and lorazepam 0.5 mg daily. On admission no concern for acute infection, lab work as well as chest x-ray no concern for pneumonia, UTI.  · S/p 1 dose of c Zyprexa IV 10 mg, QTc normal  · Patient scored a 12/30 on my MOCA today indicating moderate impairment  · BMP unremarkable  · Vitamin B12 low normal  · Folate normal  Progression of the dementia versus polypharmacy    Plan:  · Will follow geronotology recommendations  · Awaiting Psych consult  · Delirium precautions   · Minimize sleep disturbances. · Patient's family (daughter Chang Catalan) agreeable to talk to the patient at any time if the patient experiences delirium episodes and would appreciate updates daily.   · Zyprexa 2.5 mg IV as needed for severe delusions and psychosis

## 2023-07-11 NOTE — ASSESSMENT & PLAN NOTE
Frailty  · Clinical Frail Scale: Living with moderate frailty  · Total protein 6.3 and albumin 4.1  · Encourage adequate hydration and nutrition, including protein in meals  · OOB as tolerated  · PT/OT consulted  · Encourage early and frequent mobilization

## 2023-07-11 NOTE — CONSULTS
Psychiatric Evaluation - Behavioral Health   Martin Reyna 80 y.o. male MRN: [id number]  Unit/Bed#: S -01 Encounter: [id number]    Assessment and Plan       Principal Psychiatric Problem:  1. Unspecified mood disorder ([address])  a. 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 Admission labs reviewed. 2. Collaborate with collaterals for baseline assessment and disposition as indicated  3. Agree with plan to not re-start Wellbutrin. Recommended initiating Abilify 2 mg QD for mood/psychosis. Discussed with daughter and primary team.   4. Psychiatry will continue to follow patient. Please contact our service via [employer] with any additional questions or concerns. If contacting after hours, please call or TigerText the on-call team ([facility]: [phone]) with any questions or concerns. Risks, benefits and possible side effects of Medications:   Patient does not verbalize understanding at this time and will require further explanation. Discussed risks, benefits and possible side effects with patient's daughter Margoth Belle.     HPI   History of Present Illness     Physician Requesting Consult: Jj Hamlin MD  Reason for Consult / Principal Problem: behavioral change, polypharmacy    Chief Complaint: "I'm not a perfect person, I never will be", "I can't forgive myself" "it was hard for me to always help other people and it hurts more than anything"    Martin Reyna is a 80 y.o. male, possessing pertinent psychiatric history of MDD, anxiety, dementia, who presented to  AN due to behavioral disturbances at living facility [facility] including increased anxiety, aggression towards staff, refusing care, walking naked outside his room, possible delirium. Per SLIM note, staff from [facility] reported patient's mental status has been deteriorating since approximately April/May and worse in the past week. They reported patient was not as attentive to his ADLs/hygiene nor as engaged in activities/interactions with residents at facility. Patient received one time dose of IM Zyprexa 10 mg on 07/09 at [id number] in ED. On evaluation, patient is a poor historian, tangential in thought process, tearful and depressed in affect, perseverative and exhibiting mood-congruent delusions. He is unable to provide clear history of events leading to admission but is oriented to self, month, city/state. Stated 2032 for year. When asked why patient came to the hospital he states "something I've never done in my life" . .. "never got in trouble with the officer". "Inside me, I can't forgive myself" "I'm not a perfect person, I never will be". "No one and I mean no one will experience what they go through each day of their lives and the hardest part is to see them everyday". "I don't like hurting anyone". "It was hard for me to always help other people and it hurts more than anything". "Ma'am there's things in my life that you know can never be changed". Patient unable to provide further details and is unable to answer many questions due to thought process. Patient endorsed feelings of guilt, decreased energy, sleep disturbances but denied depressed mood. Per description of behaviors observed at [facility], patient appears to be anhedonic. He stated he just eats "to stay alive" and was noted to be eating chocolate pudding later in the afternoon. When asked about SI patient states, "I wouldn't say end my life. .. it's hard for me". Medical Review Of Systems:  Pertinent items are noted in HPI.     Psychiatric ROS and PMHx     Psychiatric Review Of Systems:  Sleep: Yes  Loss of Interest/Anhedonia: Based on description of observed behaviors at [facility] court, patient appears to be anhedonic  Guilt/hopeless: Yes  Low energy/anergy: yes  Poor Concentration: unable to obtain due to patient factors   Appetite changes: Yes  Weight changes: unknown  Somatic symptoms: None verbalized  Anxiety/panic: unable to obtain due to patient factors  Snow: unable to obtain due to patient factors  Self injurious behavior/risky behavior: unable to obtain due to patient factors    Historical Information     Past Psychiatric History:   Past Inpatient Psychiatric management:   Patient denies previous psychiatric admissions  Past Outpatient Psychiatric management:   Patient reports he is unsure if he has seen a psychiatrist or therapist previously  Past Medication trials: Wellbutrin, buspar, lexapro, ativan  Past Suicide attempts:   Vague/unclear. When asked, patient replies "it's one of the things I'm not proud of. .." but continued to be tangential  History of non-suicidal self injury:   Unable to obtain due to patient factors  Past Violent behavior:   Unable to obtain due to patient factors    Substance Abuse History:    Social History     Tobacco History     Smoking Status  Former Smoking Tobacco Type  Cigarettes    Smokeless Tobacco Use  Never          Alcohol History     Alcohol Use Status  Not Asked          Drug Use     Drug Use Status  Never          Sexual Activity     Sexually Active  Not Asked          Activities of Daily Living    Not Asked                 I am unable to assess the patient for substance use within the past 12 months as they are unable or unwilling to answer  UDS negative on admission. Family Psychiatric History:   unable to obtain due to patient factors    Social History:  Education: unable to obtain due to patient factors  Learning Disabilities: unknown  Marital history: unclear, mentions a wife   Living arrangement, social support: patient from [employer], reports 3 daughters are supportive.   Access to firearms: unable to obtain due to patient factors  Occupational History: unable to obtain due to patient factors  Functioning Relationships: Reports daughters are supportive  Other Pertinent History: unable to obtain due to patient factors      Traumatic History:   Abuse: unable to obtain due to patient factors  Other Traumatic Events: unable to obtain due to patient factors    Past Medical History:   Diagnosis Date   • Insomnia 9/9/2019       Meds/Allergies   all current active meds have been reviewed  Allergies   Allergen Reactions   • Bee Venom        Objective   Vital signs in last 24 hours:  Temp:  [97.9 °F (36.6 °C)-98.1 °F (36.7 °C)] 98 °F (36.7 °C)  HR:  [64-76] 71  Resp:  [16] 16  BP: (100-149)/(54-92) 123/92      Intake/Output Summary (Last 24 hours) at 7/11/2023 1551  Last data filed at 7/10/2023 1552  Gross per 24 hour   Intake 120 ml   Output --   Net 120 ml       Mental Status Evaluation and Medical ROS     Mental Status Evaluation:  Appearance:  poor eye contact with eyes closed for majority of interview, appears stated age, marginal grooming/hygiene, white hair and dressed in hospital attire   Behavior:  limited cooperativity and laying/hunched in bed and clutching head at times, wailing at times, anxious   Motor: restless and fidgety and Unable to assess gait/balance as patient laying in bed, some psychomotor retardation   Speech:  delayed initiation, slow, soft and coherent   Mood:  guilty   Affect:  mood-congruent, depressed, anxious and tearful   Thought Process:  disorganized, perseverative, tangential   Thought Content: negative thoughts, mood-congruent delusions   Perceptual disturbances: does not appear to be responding to internal stimuli at this time but appears internally preoccupied, and assessment limited due to patient factors   Risk Potential: No active or passive suicidal or homicidal ideation was verbalized during interview; limited assessment due to patient factors   Cognition: appears to be of average intelligence, cognition not formally tested and oriented to self   Insight:  Limited   Judgment: Limited     Motor Activity: no abnormal movements       Laboratory results:    I have personally reviewed all pertinent laboratory/tests results.   Most Recent Labs:   Lab Results   Component Value Date    WBC 6.28 07/09/2023    RBC 4.14 07/09/2023    HGB 13.3 07/09/2023    HCT 39.6 07/09/2023     07/09/2023    RDW 13.0 07/09/2023    NEUTROABS 4.31 07/09/2023    SODIUM 144 07/10/2023    K 3.7 07/10/2023     07/10/2023    CO2 29 07/10/2023    BUN 9 07/10/2023    CREATININE 0.86 07/10/2023    GLUC 88 07/10/2023    CALCIUM 9.3 07/10/2023    AST 19 07/09/2023    ALT 14 07/09/2023    ALKPHOS 63 07/09/2023    TP 6.3 (L) 07/09/2023    ALB 4.1 07/09/2023    TBILI 0.43 07/09/2023    AMMONIA 36 07/09/2023    TYW2ITRKUHCR 4.401 07/09/2023     Labs in last 72 hours:   Recent Labs     07/09/23  0200 07/10/23  0443   WBC 6.28  --    RBC 4.14  --    HGB 13.3  --    HCT 39.6  --      --    RDW 13.0  --    NEUTROABS 4.31  --    SODIUM 139 144   K 3.8 3.7    107   CO2 25 29   BUN 9 9   CREATININE 0.89 0.86   GLUC 102 88   CALCIUM 9.4 9.3   AST 19  --    ALT 14  --    ALKPHOS 63  --    TP 6.3*  --    ALB 4.1  --    TBILI 0.43  --    AMMONIA 36  --    [id number] 4.401  --      Admission Labs:   Admission on 07/09/2023   Component Date Value   • WBC 07/09/2023 6.28    • RBC 07/09/2023 4.14    • Hemoglobin 07/09/2023 13.3    • Hematocrit 07/09/2023 39.6    • MCV 07/09/2023 96    • MCH 07/09/2023 32.1    • MCHC 07/09/2023 33.6    • RDW 07/09/2023 13.0    • MPV 07/09/2023 9.4    • Platelets 81/25/3424 244    • nRBC 07/09/2023 0    • Neutrophils Relative 07/09/2023 68    • Immat GRANS % 07/09/2023 0    • Lymphocytes Relative 07/09/2023 15    • Monocytes Relative 07/09/2023 14 (H)    • Eosinophils Relative 07/09/2023 2    • Basophils Relative 07/09/2023 1    • Neutrophils Absolute 07/09/2023 4.31    • Immature Grans Absolute 07/09/2023 0.02    • Lymphocytes Absolute 07/09/2023 0.91    • Monocytes Absolute 07/09/2023 0.85    • Eosinophils Absolute 07/09/2023 0.15    • Basophils Absolute 07/09/2023 0.04    • Sodium 07/09/2023 139    • Potassium 07/09/2023 3.8    • Chloride 07/09/2023 104    • CO2 07/09/2023 25    • ANION GAP 07/09/2023 10    • BUN 07/09/2023 9    • Creatinine 07/09/2023 0.89    • Glucose 07/09/2023 102    • Calcium 07/09/2023 9.4    • AST 07/09/2023 19    • ALT 07/09/2023 14    • Alkaline Phosphatase 07/09/2023 63    • Total Protein 07/09/2023 6.3 (L)    • Albumin 07/09/2023 4.1    • Total Bilirubin 07/09/2023 0.43    • eGFR 07/09/2023 74    • Color, UA 07/09/2023 Light Yellow    • Clarity, UA 07/09/2023 Clear    • Specific Gravity, UA 07/09/2023 1.010    • pH, UA 07/09/2023 6.5    • Leukocytes, UA 07/09/2023 Negative    • Nitrite, UA 07/09/2023 Negative    • Protein, UA 07/09/2023 Negative    • Glucose, UA 07/09/2023 Negative    • Ketones, UA 07/09/2023 Negative    • Urobilinogen, UA 07/09/2023 <2.0    • Bilirubin, UA 07/09/2023 Negative    • Occult Blood, UA 07/09/2023 Negative    • hs TnI 0hr 07/09/2023 11    • Amph/Meth UR 07/09/2023 Negative    • Barbiturate Ur 07/09/2023 Negative    • Benzodiazepine Urine 07/09/2023 Negative    • Cocaine Urine 07/09/2023 Negative    • Methadone Urine 07/09/2023 Negative    • Opiate Urine 07/09/2023 Negative    • PCP Ur 07/09/2023 Negative    • THC Urine 07/09/2023 Negative    • Oxycodone Urine 07/09/2023 Negative    • TSH 3RD GENERATON 07/09/2023 4. 401    • Ammonia 07/09/2023 36    • Ethanol Lvl 61/98/2988 <03    • Salicylate Lvl 78/35/2541 <5    • Acetaminophen Level 07/09/2023 <10 (L)    • Ventricular Rate 07/09/2023 83    • Atrial Rate 07/09/2023 83    • AR Interval 07/09/2023 202    • QRSD Interval 07/09/2023 130    • QT Interval 07/09/2023 394    • QTC Interval 07/09/2023 462    • P Axis 07/09/2023 50    • QRS Axis 07/09/2023 -65    • T Wave Axis 07/09/2023 61    • POC Glucose 07/09/2023 108    • hs TnI 2hr 07/09/2023 16    • Delta 2hr hsTnI 07/09/2023 5    • hs TnI 4hr 07/09/2023 23    • Delta 4hr hsTnI 07/09/2023 12    • Ventricular Rate 07/09/2023 60    • Atrial Rate 07/09/2023 60    • NC Interval 07/09/2023 194    • QRSD Interval 07/09/2023 134    • QT Interval 07/09/2023 444    • QTC Interval 07/09/2023 444    • P Axis 07/09/2023 64    • QRS Axis 07/09/2023 -63    • T Wave Axis 07/09/2023 68    • Procalcitonin 07/09/2023 <0.05    • Sodium 07/10/2023 144    • Potassium 07/10/2023 3.7    • Chloride 07/10/2023 107    • CO2 07/10/2023 29    • ANION GAP 07/10/2023 8    • BUN 07/10/2023 9    • Creatinine 07/10/2023 0.86    • Glucose 07/10/2023 88    • Calcium 07/10/2023 9.3    • eGFR 07/10/2023 75    • Vitamin B-12 07/10/2023 174 (L)    • Folate 07/10/2023 6.6        Imaging Studies:   XR chest 1 view portable  Result Date: 7/9/2023  Impression: No acute cardiopulmonary disease. CT head without contrast  Result Date: 7/9/2023  Impression: Moderately degraded study by patient motion demonstrates no gross evidence of acute hemorrhage or ischemia. However, if there is continued clinical concern, a repeat CT brain should be considered. Mild chronic small vessel ischemic changes.     EKG: QTc= 444ms on 07/09    Risk of Harm to Self:   • The following ratings are based on assessment at the time of the interview  • Demographic risk factors include: , male, elderly (76 or older)   • Historical Risk Factors include: history of depression, history of mood disorder  • Current Specific Risk Factors include: diagnosis of depression, mental illness diagnosis, impaired cognition, limited support system, health problems, ongoing psychotic symptoms  • Protective Factors: no current suicidal ideation, ability to adapt to change, ability to manage anger well, no current suicidal plan or intent, compliant with medications, stable housing, supportive family, ability to communicate with staff  • Weapons/Firearms: unable to obtain due to patient factors. The following steps have been taken to ensure weapons are properly secured: not applicable  • Based on today's assessment, Steve Redman presents the following risk of harm to self: low    Risk of Harm to Others:  • The following ratings are based on assessment at the time of the interview  • Demographic Risk Factors include: male, unemployed. • Historical Risk Factors include: none. • Current Specific Risk Factors include: recent episode of mood instability, impaired cognition, current psychotic symptoms, recent aggression  • Protective Factors: no current homicidal ideation, compliant with medications, stable living environment, supportive family, connection to own children, access to mental health treatment  • Weapons/Firearms: unable to obtain due to patient factors. The following steps have been taken to ensure weapons are properly secured: not applicable  • Based on today's assessment, Steve eRdman presents the following risk of harm to others: low    This note has been constructed in part using a voice recognition system. There may be translation, syntax,  or grammatical errors. If you have any questions, please contact the dictating provider.     Nicki Hutton, DO  Psychiatry Resident, PGY-2

## 2023-07-11 NOTE — PLAN OF CARE
Problem: OCCUPATIONAL THERAPY ADULT  Goal: Performs self-care activities at highest level of function for planned discharge setting. See evaluation for individualized goals. Description: Treatment Interventions: ADL retraining, Functional transfer training, UE strengthening/ROM, Endurance training, Cognitive reorientation, Patient/family training, Equipment evaluation/education, Compensatory technique education, Energy conservation, Activityengagement          See flowsheet documentation for full assessment, interventions and recommendations. 7/11/2023 1611 by Kishore Pozo OT  Note: Limitation: Decreased ADL status, Decreased UE strength, Decreased Safe judgement during ADL, Decreased cognition, Decreased endurance, Decreased self-care trans, Decreased high-level ADLs  Prognosis: Good  Assessment: Patient is a 80 y.o. male seen for OT evaluation and treatment  at [facility] following admission on 7/9/2023  s/p Moderate vascular dementia with psychotic disturbance ([address] Comorbidities and significant PMHx impacting functional performance include: Moderate vascular dementia with psychotic disturbance , anxiety with depression, GERD< falls, R foot drop, insomnia. Patient presents with active orders for OT eval and treat and up and OOB as tolerated . Prior to admission, patient was independent with functional mobility with RW with SBA , independent with ADLS and independent with IADLS. Upon initial evaluation, patient requires min assist for UB ADLs, min assist for LB ADLs, supervision for bed mobility, min assist for transfers and  Min assist for functional mobility household distance with HHA. Based on functional eval, pt presents with intact  attention, impaired  safety awareness, impaired  problem solving skills, and impaired  memory.  Occupational performance is affected by the following deficits:  decreased muscular strength , decreased balance , decreased standing tolerance for self care tasks , decreased dynamic balance impacting functional reach, decreased functional reach , decreased activity tolerance , impaired judgement and problem solving , impaired safety awareness , mood limitation, impaired learning ability d/t current cognitive status  and impaired global mental status The AM-PAC & Barthel Index outcome tools were used to assist in determining pt safety w/self care /mobility and appropriate d/c recommendations, see above for score. Personal factors impacting performance include: decreased Assistance needed for ADL/IADLs, Assistance needed for ADLs and functional mobility, High fall risk , decreased insight toward deficits , decreased recall of precautions  and baseline cognitive deficits. Patient would benefit from OT services within the acute care setting to maximize level of functional independence in the following occupational areas activity engagement , safety procedures , fall prevention , energy conservation , self-care transfers, bed mobility , functional mobility and ADLs. From OT standpoint, recommendation at time of D/C would be return to facility with rehabilitation     OT Discharge Recommendation: Return to facility with rehabilitation services       7/11/2023 1611 by Debby Joshi OT  Note: Limitation: Decreased ADL status, Decreased UE strength, Decreased Safe judgement during ADL, Decreased cognition, Decreased endurance, Decreased self-care trans, Decreased high-level ADLs  Prognosis: Good  Assessment: Patient is a 80 y.o. male seen for OT evaluation and treatment  at [facility] following admission on 7/9/2023  s/p Moderate vascular dementia with psychotic disturbance ([address] Comorbidities and significant PMHx impacting functional performance include: Moderate vascular dementia with psychotic disturbance , anxiety with depression, GERD< falls, R foot drop, insomnia.  Patient presents with active orders for OT eval and treat and up and OOB as tolerated . Prior to admission, patient was independent with functional mobility with RW with SBA , independent with ADLS and independent with IADLS. Upon initial evaluation, patient requires min assist for UB ADLs, min assist for LB ADLs, supervision for bed mobility, min assist for transfers and  Min assist for functional mobility household distance with HHA. Based on functional eval, pt presents with intact  attention, impaired  safety awareness, impaired  problem solving skills, and impaired  memory. Occupational performance is affected by the following deficits:  decreased muscular strength , decreased balance , decreased standing tolerance for self care tasks , decreased dynamic balance impacting functional reach, decreased functional reach , decreased activity tolerance , impaired judgement and problem solving , impaired safety awareness , mood limitation, impaired learning ability d/t current cognitive status  and impaired global mental status The AM-PAC & Barthel Index outcome tools were used to assist in determining pt safety w/self care /mobility and appropriate d/c recommendations, see above for score. Personal factors impacting performance include: decreased Assistance needed for ADL/IADLs, Assistance needed for ADLs and functional mobility, High fall risk , decreased insight toward deficits , decreased recall of precautions  and baseline cognitive deficits. Patient would benefit from OT services within the acute care setting to maximize level of functional independence in the following occupational areas activity engagement , safety procedures , fall prevention , energy conservation , self-care transfers, bed mobility , functional mobility and ADLs.  From OT standpoint, recommendation at time of D/C would be return to facility with rehabilitation     OT Discharge Recommendation: Return to facility with rehabilitation services    Joseph Ashton, [id number] [facility] OTR/L   [facility] HETALIN Licensure# [id number]

## 2023-07-11 NOTE — PROGRESS NOTES
[facility]  Progress Note  Name: Soo Blanton  MRN: [id number]  Unit/Bed#: S -01 I Date of Admission: 7/9/2023   Date of Service: 7/11/2023 I Hospital Day: 2    Assessment/Plan   * Moderate vascular dementia with psychotic disturbance [facility])  Assessment & Plan  · Presents on admission due to changes in behavior that happened lately. Patient coming from [facility] for a care facility. Patient aggressive to staff, refusing care, walking naked outside the room. · Patient with history of major depressive disorder after spouse passed, has been on BuSpar 10 mg 2 times a day, Lexapro 10 mg daily and lorazepam 0.5 mg daily. On admission no concern for acute infection, lab work as well as chest x-ray no concern for pneumonia, UTI.  · S/p 1 dose of c Zyprexa IV 10 mg, QTc normal  · Patient scored a 12/30 on my MOCA today indicating moderate impairment  · BMP unremarkable  · Vitamin B12 low normal  · Folate normal  Progression of the dementia versus polypharmacy    Plan:  · Gerontology consulted, concern for polypharmacy  · Psych consult  · Delirium precautions   · Minimize sleep disturbances. · Patient's family (daughter Vinh Sorensen) agreeable to talk to the patient at any time if the patient experiences delirium episodes and would appreciate updates daily.   · Zyprexa 2.5 mg IV as needed for severe delusions and psychosis      Anxiety with depression  Assessment & Plan    · Will hold wellbutrin as he had never officially started this medication  · Continue with BuSpar 10 milligrams twice daily  · Lexapro 10 milligrams daily  · lorazepam 0.5 mg PRN at nighttime rather than scheduled daily   · Gerontology consulted for recommendations  · Ordered psych consult to help with medication decisions    Frailty  Assessment & Plan  Frailty  · Clinical Frail Scale: Living with moderate frailty  · Total protein 6.3 and albumin 4.1  · Encourage adequate hydration and nutrition, including protein in meals  · OOB as tolerated  · PT/OT consulted  · Encourage early and frequent mobilization    Gastroesophageal reflux disease without esophagitis  Assessment & Plan  · Patient was experiencing episodes of cough and epigastric pain in the past.  · Currently denying any cough or abdominal pain  · Continue with famotidine 20 mg daily  · Dysphagia 1-pureed diet in place    Slow transit constipation  Assessment & Plan  Continue with Colace 100 mg daily    Insomnia  Assessment & Plan  First-line treatment is behavior modification  · Maintain sleep-wake cycle, avoid nighttime interruptions  · Avoid caffeine throughout the day  · Avoid napping throughout the day  · Encourage physical activity throughout the day  · Avoid sedative hypnotics including benzodiazepines and benadryl    Ambulatory dysfunction  Assessment & Plan  • At a baseline ambulates without AD  • PT/OT following  • Fall precautions  • Out of bed as tolerated  • Encourage early and frequent mobilization  • Encourage adequate hydration and nutrition  • Provide adequate pain management   • Goal is to return [place] [facility]   • continue with PT/OT for continued strength and balance training                VTE Pharmacologic Prophylaxis: VTE Score: 4 Moderate Risk (Score 3-4) - Pharmacological DVT Prophylaxis Ordered: enoxaparin (Lovenox). Patient Centered Rounds: I performed bedside rounds with nursing staff today. Discussions with Specialists or Other Care Team Provider: Geronotology    Education and Discussions with Family / Patient: Updated  (daughter) via phone. Current Length of Stay: 2 day(s)  Current Patient Status: Inpatient   Discharge Plan: Anticipate discharge tomorrow to home.     Code Status: Level 1 - Full Code    Subjective:       Objective:     Vitals:   Temp (24hrs), Av.9 °F (36.6 °C), Min:97.7 °F (36.5 °C), Max:98.1 °F (36.7 °C)    Temp:  [97.7 °F (36.5 °C)-98.1 °F (36.7 °C)] 97.9 °F (36.6 °C)  HR:  [64-76] 64  Resp: [16-18] 16  BP: (100-149)/(49-73) 149/73  SpO2:  [92 %-97 %] 97 %  There is no height or weight on file to calculate BMI. Input and Output Summary (last 24 hours): Intake/Output Summary (Last 24 hours) at 7/11/2023 0854  Last data filed at 7/10/2023 1552  Gross per 24 hour   Intake 120 ml   Output --   Net 120 ml       Physical Exam:   Physical Exam     Additional Data:     Labs:  Results from last 7 days   Lab Units 07/09/23  0200   WBC Thousand/uL 6.28   HEMOGLOBIN g/dL 13.3   HEMATOCRIT % 39.6   PLATELETS Thousands/uL 244   NEUTROS PCT % 68   LYMPHS PCT % 15   MONOS PCT % 14*   EOS PCT % 2     Results from last 7 days   Lab Units 07/10/23  0443 07/09/23  0200   SODIUM mmol/L 144 139   POTASSIUM mmol/L 3.7 3.8   CHLORIDE mmol/L 107 104   CO2 mmol/L 29 25   BUN mg/dL 9 9   CREATININE mg/dL 0.86 0.89   ANION GAP mmol/L 8 10   CALCIUM mg/dL 9.3 9.4   ALBUMIN g/dL  --  4.1   TOTAL BILIRUBIN mg/dL  --  0.43   ALK PHOS U/L  --  63   ALT U/L  --  14   AST U/L  --  19   GLUCOSE RANDOM mg/dL 88 102         Results from last 7 days   Lab Units 07/09/23  0155   POC GLUCOSE mg/dl 108         Results from last 7 days   Lab Units 07/09/23  0200   PROCALCITONIN ng/ml <0.05       Lines/Drains:  Invasive Devices     Peripheral Intravenous Line  Duration           Peripheral IV 07/09/23 Right;Ventral (anterior) Forearm 1 day                      Imaging: No pertinent imaging reviewed.     Recent Cultures (last 7 days):         Last 24 Hours Medication List:   Current Facility-Administered Medications   Medication Dose Route Frequency Provider Last Rate   • acetaminophen  650 mg Oral Q6H PRN Orvan Kehr, MD     • busPIRone  10 mg Oral BID Orvan Kehr, MD     • cyanocobalamin  1,000 mcg Intramuscular Q30 Days Sanju Denson MD     • docusate sodium  100 mg Oral Daily Orvan Kehr, MD     • enoxaparin  40 mg Subcutaneous Daily Orvan Kehr, MD     • escitalopram  10 mg Oral Daily Orvan Kehr, MD • famotidine  20 mg Oral Daily Can Hart MD     • fluticasone  1 spray Nasal Daily Can Hart MD     • LORazepam  0.5 mg Oral HS PRN Junie Donato MD     • OLANZapine  2.5 mg Oral Q6H PRN Edith Pollock MD          Today, Patient Was Seen By: Weston Kennedy MD    **Please Note: This note may have been constructed using a voice recognition system. **

## 2023-07-11 NOTE — CASE MANAGEMENT
Case Management Progress Note    Patient name Darrel Mcguire  Location S /S -01 MRN [id number]  : 10/8/1930 Date 2023       LOS (days): 2  Geometric Mean LOS (GMLOS) (days): 4.70  Days to GMLOS:2.5        OBJECTIVE:        Current admission status: Inpatient  Preferred Pharmacy: No Pharmacies Listed  Primary Care Provider: Aron Muro MD    Primary Insurance: MEDICARE  Secondary Insurance: AARP    PROGRESS NOTE:    Per rounds, patient ready for d/c back to [facility] today. Call made to facility and spoke again with their nurse, Sindy Douglas - relayed anticipated d/c for this afternoon and she just requested AVS be faxed ASAP and to be notified when transport is confirmed. TT sent to MD to follow-up re: same; to complete AVS and reach out to daughter about d/c. Will forward AVS to facility once complete and contact daughter after MD reaches out.

## 2023-07-11 NOTE — CASE MANAGEMENT
Case Management Assessment & Discharge Planning Note    Patient name Patterson Paget  Location S /S -01 MRN [id number]  : 10/8/1930 Date 2023       Current Admission Date: 2023  Current Admission Diagnosis:Moderate vascular dementia with psychotic disturbance [facility])   Patient Active Problem List    Diagnosis Date Noted   • Frailty 2023   • Gastroesophageal reflux disease without esophagitis 2023   • Bilateral wrist pain 2023   • Right foot drop 09/15/2022   • Dysphagia 09/15/2022   • Anxiety 2022   • Fall 2022   • Slow transit constipation 2022   • Furuncle of scrotum 2021   • Thigh pain, musculoskeletal, right 2021   • Primary osteoarthritis of both shoulders 2021   • Moderate vascular dementia with psychotic disturbance ([address]) 2019   • Depression 2019   • Anxiety with depression 2019   • Ambulatory dysfunction 2019   • Insomnia 2019      LOS (days): 2  Geometric Mean LOS (GMLOS) (days): 4.70  Days to GMLOS:2.6     OBJECTIVE:    Risk of Unplanned Readmission Score: 11.05         Current admission status: Inpatient       Preferred Pharmacy:   [address] ([facility] ) [address]  Phone: [phone] Fax: [phone]    Primary Care Provider: Catina Alatorre MD    Primary Insurance: MEDICARE  Secondary Insurance: AARP    ASSESSMENT:  [place] Proxies    There are no active Health Care Proxies on file.                       Patient Information  Admitted from[de-identified] Facility ([address] [place] [address] - assisted living)  Mental Status: Confused  During Assessment patient was accompanied by: Not accompanied during assessment  Assessment information provided by[de-identified] Other - please comment Caity Hawley, nurse at [address] [place] [address])  Support Systems: Self, Other (Comment), Daughter (facility staff at [address] [place] [address])  [facility] Residence: [facility] [address] [facility] do you live in?: [facility] entry access options.  Select all that apply.: No steps to enter home  Type of Current Residence: Facility ([facility], assisted living)  In the last 12 months, was there a time when you were not able to pay the mortgage or rent on time?: No  In the last 12 months, how many places have you lived?: 1  In the last 12 months, was there a time when you did not have a steady place to sleep or slept in a shelter (including now)?: No  Homeless/housing insecurity resource given?: N/A  Living Arrangements: Other (Comment) ([facility] - assisted living)    Activities of Daily Living Prior to Admission  Functional Status: Independent  Completes ADLs independently?: Yes  Ambulates independently?: Yes         Patient Information Continued  Does patient have prescription coverage?: Yes  Food insecurity resource given?: N/A  Does patient receive dialysis treatments?: No         Means of Transportation  In the past 12 months, has lack of transportation kept you from medical appointments or from getting medications?: No  In the past 12 months, has lack of transportation kept you from meetings, work, or from getting things needed for daily living?: No  Was application for public transport provided?: N/A        DISCHARGE DETAILS:    Discharge planning discussed with[de-identified] nurse [place] at [address] [place] [address] Team discharge recommendations reviewed with patient/caregiver?: Yes  Did patient/caregiver verbalize understanding of patient care needs?: N/A- going to facility ([facility])  Were patient/caregiver advised of the risks associated with not following Treatment Team discharge recommendations?: Yes    Contacts  Patient Contacts: Yovany Barban, daughter  Relationship to Patient[de-identified] [address] [place] [address]          Is the patient interested in [facility] [address] at discharge?: Yes  [facility] requested[de-identified] [facility] Name[de-identified] [address] [place] [address] External Referral Reason (only applicable if external HHA name selected): Patient has established relationship with provider  [facility] Provider[de-identified] PCP  Home Health Services Needed[de-identified] Evaluate Functional Status and Safety, Strengthening/Theraputic Exercises to Improve Function, Other (comment) (behavioral health)  Homebound Criteria Met[de-identified] Uses an Assist Device (i.e. cane, walker, etc), Requires the Assistance of Another Person for Safe Ambulation or to Leave the Home  Supporting Clincal Findings[de-identified] Cognitive Deficit Requiring the Assistance of Others, Fatigues Easliy in Short Distances         Other Referral/Resources/Interventions Provided:  Interventions: Facility Return, Assisted Living, [facility]  Referral Comments: [facility] [place] secondary to worsening cognition. Geriatrics and psych consulted. PT/OT evals pending. VM received from [facility] reporting patient current with them for home care services ([address]); referral sent to them in [facility] for resumption of care. Call made to [facility] ([phone]) and spoke with their nurse, Deuce Avitia; reports that patient is normally independent/supervision with ADLs, ambulation and able to return once medically stable. Requesting AVS be faxed to her at [phone] for review prior to patient's return.     Would you like to participate in our [facility] service program?  : No - Declined    Treatment Team Recommendation: Facility Return, Assisted Living, Home with [address] [facility]  Discharge Destination Plan[de-identified] [facility] Return, Home with [address]

## 2023-07-11 NOTE — PROGRESS NOTES
[facility]  Progress Note  Name: Andre To  MRN: [id number]  Unit/Bed#: S -01 I Date of Admission: 7/9/2023   Date of Service: 7/11/2023 I Hospital Day: 2    Assessment/Plan   * Moderate vascular dementia with psychotic disturbance [facility])  Assessment & Plan  · Presents on admission due to changes in behavior that happened lately. Patient coming from [facility] for a care facility. Patient aggressive to staff, refusing care, walking naked outside the room. · Patient with history of major depressive disorder after spouse passed, has been on BuSpar 10 mg 2 times a day, Lexapro 10 mg daily and lorazepam 0.5 mg daily. On admission no concern for acute infection, lab work as well as chest x-ray no concern for pneumonia, UTI.  · S/p 1 dose of c Zyprexa IV 10 mg, QTc normal  · Patient scored a 12/30 on my MOCA today indicating moderate impairment  · BMP unremarkable  · Vitamin B12 low normal  · Folate normal  Progression of the dementia versus polypharmacy    Plan:  · Will follow geronotology recommendations  · Delirium precautions   · Minimize sleep disturbances. · Patient's family (daughter Stephy Koenig) agreeable to talk to the patient at any time if the patient experiences delirium episodes and would appreciate updates daily.   · Zyprexa 2.5 mg IV as needed for severe delusions and psychosis  · One time vitamin B12 injection      Anxiety with depression  Assessment & Plan    · Will hold wellbutrin as he had never officially started this medication  · Continue with BuSpar 10 milligrams twice daily  · Lexapro 10 milligrams daily  · lorazepam 0.5 mg PRN at nighttime rather than scheduled daily   · Gerontology consulted for recommendations  · Psych suggested adding abilify 2 mg QD will monitor him overnight on new medication and watch out for declining metal status    Frailty  Assessment & Plan  Frailty  · Clinical Frail Scale: Living with moderate frailty  · Total protein 6.3 and albumin 4.1  · Encourage adequate hydration and nutrition, including protein in meals  · OOB as tolerated  · PT/OT consulted  · Encourage early and frequent mobilization    Gastroesophageal reflux disease without esophagitis  Assessment & Plan  · Patient was experiencing episodes of cough and epigastric pain in the past.  · Currently denying any cough or abdominal pain  · Continue with famotidine 20 mg daily  · Dysphagia 1-pureed diet in place    Slow transit constipation  Assessment & Plan  Continue with Colace 100 mg daily    Insomnia  Assessment & Plan  First-line treatment is behavior modification  · Maintain sleep-wake cycle, avoid nighttime interruptions  · Avoid caffeine throughout the day  · Avoid napping throughout the day  · Encourage physical activity throughout the day  · Avoid sedative hypnotics including benzodiazepines and benadryl    Ambulatory dysfunction  Assessment & Plan  • At a baseline ambulates without AD  • PT/OT following  • Fall precautions  • Out of bed as tolerated  • Encourage early and frequent mobilization  • Encourage adequate hydration and nutrition  • Provide adequate pain management   • Goal is to return [facility] [place] [facility]   • continue with PT/OT for continued strength and balance training              VTE Pharmacologic Prophylaxis: VTE Score: 4 Moderate Risk (Score 3-4) - Pharmacological DVT Prophylaxis Ordered: enoxaparin (Lovenox). Patient Centered Rounds: I performed bedside rounds with nursing staff today. Discussions with Specialists or Other Care Team Provider: Geriatrics and Psychiatry    Education and Discussions with Family / Patient: Updated  (daughter) via phone. Current Length of Stay: 2 day(s)  Current Patient Status: Inpatient   Discharge Plan: Anticipate discharge tomorrow to Nursing facility    Code Status: Level 1 - Full Code    Subjective:   Patient examined at bedside. Had no complaints this morning.  Said he felt well and looked happy.    Objective:     Vitals:   Temp (24hrs), Av.9 °F (36.6 °C), Min:97.7 °F (36.5 °C), Max:98.1 °F (36.7 °C)    Temp:  [97.7 °F (36.5 °C)-98.1 °F (36.7 °C)] 97.9 °F (36.6 °C)  HR:  [64-76] 64  Resp:  [16-18] 16  BP: (100-149)/(49-73) 149/73  SpO2:  [92 %-97 %] 97 %  There is no height or weight on file to calculate BMI. Input and Output Summary (last 24 hours): Intake/Output Summary (Last 24 hours) at 2023 1511  Last data filed at 7/10/2023 1552  Gross per 24 hour   Intake 120 ml   Output --   Net 120 ml       Physical Exam:   Physical Exam  Constitutional:       General: He is not in acute distress. Appearance: He is normal weight. He is not toxic-appearing. HENT:      Head: Normocephalic. Nose: Nose normal. No congestion or rhinorrhea. Mouth/Throat:      Mouth: Mucous membranes are moist.      Pharynx: Oropharynx is clear. Eyes:      Extraocular Movements: Extraocular movements intact. Conjunctiva/sclera: Conjunctivae normal.      Pupils: Pupils are equal, round, and reactive to light. Cardiovascular:      Rate and Rhythm: Normal rate and regular rhythm. Pulses: Normal pulses. Heart sounds: Normal heart sounds. No murmur heard. No friction rub. No gallop. Pulmonary:      Effort: Pulmonary effort is normal.      Breath sounds: Normal breath sounds. No wheezing, rhonchi or rales. Chest:      Chest wall: No tenderness. Abdominal:      General: There is no distension. Palpations: Abdomen is soft. Tenderness: There is no abdominal tenderness. There is no guarding or rebound. Genitourinary:     Penis: Normal.       Comments: No obvious lesions noted. Musculoskeletal:         General: No deformity. Right lower leg: No edema. Left lower leg: No edema. Skin:     General: Skin is warm. Capillary Refill: Capillary refill takes less than 2 seconds. Findings: No lesion or rash. Neurological:      Mental Status: He is alert. Comments: Oriented to person and place   Psychiatric:      Comments: He was more cheerful this morning             Additional Data:     Labs:  Results from last 7 days   Lab Units 07/09/23  0200   WBC Thousand/uL 6.28   HEMOGLOBIN g/dL 13.3   HEMATOCRIT % 39.6   PLATELETS Thousands/uL 244   NEUTROS PCT % 68   LYMPHS PCT % 15   MONOS PCT % 14*   EOS PCT % 2     Results from last 7 days   Lab Units 07/10/23  0443 07/09/23  0200   SODIUM mmol/L 144 139   POTASSIUM mmol/L 3.7 3.8   CHLORIDE mmol/L 107 104   CO2 mmol/L 29 25   BUN mg/dL 9 9   CREATININE mg/dL 0.86 0.89   ANION GAP mmol/L 8 10   CALCIUM mg/dL 9.3 9.4   ALBUMIN g/dL  --  4.1   TOTAL BILIRUBIN mg/dL  --  0.43   ALK PHOS U/L  --  63   ALT U/L  --  14   AST U/L  --  19   GLUCOSE RANDOM mg/dL 88 102         Results from last 7 days   Lab Units 07/09/23  0155   POC GLUCOSE mg/dl 108         Results from last 7 days   Lab Units 07/09/23  0200   PROCALCITONIN ng/ml <0.05       Lines/Drains:  Invasive Devices     Peripheral Intravenous Line  Duration           Peripheral IV 07/09/23 Right;Ventral (anterior) Forearm 2 days                      Imaging: No pertinent imaging reviewed.     Recent Cultures (last 7 days):         Last 24 Hours Medication List:   Current Facility-Administered Medications   Medication Dose Route Frequency Provider Last Rate   • acetaminophen  650 mg Oral Q6H PRN Aggie Stone MD     • ARIPiprazole  2 mg Oral Daily Glenn Gonzales DO     • busPIRone  10 mg Oral BID Aggie Stone MD     • cyanocobalamin  1,000 mcg Intramuscular Q30 Days Lamar Horton MD     • docusate sodium  100 mg Oral Daily Aggie Stone MD     • enoxaparin  40 mg Subcutaneous Daily Aggie Stone MD     • escitalopram  10 mg Oral Daily Aggie Stone MD     • famotidine  20 mg Oral Daily Aggie Stone MD     • fluticasone  1 spray Nasal Daily Aggie Stone MD     • LORazepam  0.5 mg Oral HS PRN Lamar Horton MD     • OLANZapine  2.5 mg Oral Q6H PRN Clifton Cordero MD          Today, Patient Was Seen By: Riley Barker MD    **Please Note: This note may have been constructed using a voice recognition system. **

## 2023-07-11 NOTE — CASE MANAGEMENT
Case Management Discharge Planning Note    Patient name Capo Schneider  Location S /S -01 MRN [id number]  : 10/8/1930 Date 2023       Current Admission Date: 2023  Current Admission Diagnosis:Moderate vascular dementia with psychotic disturbance [facility])   Patient Active Problem List    Diagnosis Date Noted   • Frailty 2023   • Gastroesophageal reflux disease without esophagitis 2023   • Bilateral wrist pain 2023   • Right foot drop 09/15/2022   • Dysphagia 09/15/2022   • Anxiety 2022   • Fall 2022   • Slow transit constipation 2022   • Furuncle of scrotum 2021   • Thigh pain, musculoskeletal, right 2021   • Primary osteoarthritis of both shoulders 2021   • Moderate vascular dementia with psychotic disturbance ([address]) 2019   • Depression 2019   • Anxiety with depression 2019   • Ambulatory dysfunction 2019   • Insomnia 2019      LOS (days): 2  Geometric Mean LOS (GMLOS) (days): 4.70  Days to GMLOS:2.4     OBJECTIVE:  Risk of Unplanned Readmission Score: 11.36         Current admission status: Inpatient   Preferred Pharmacy:   [facility] ([address] [facility]  [address] [place] [address]  Phone: [phone] Fax: [address] [address]  Phone: [phone] Fax: [phone]    Primary Care Provider: Tg Orta MD    Primary Insurance: MEDICARE  Secondary Insurance: AARP    DISCHARGE DETAILS:    Discharge planning discussed with[de-identified] Kasia Jennings RN at [facility]; daughterSuri, by phone  Freedom of Choice: Yes (re: facility return)  Comments - Freedom of Choice: Manjeet Peterson  CM contacted family/caregiver?: Yes (spoke with Suri bailey)  Were Treatment Team discharge recommendations reviewed with patient/caregiver?: Yes  Did patient/caregiver verbalize understanding of patient care needs?: N/A- going to facility  Were patient/caregiver advised of the risks associated with not following Treatment Team discharge recommendations?: Yes    Contacts  Patient Contacts: Indira Lancaster, daughter  Relationship to Patient[de-identified] Family  Contact Method: Phone  Phone Number: [phone]  Reason/Outcome: Emergency Contact, Continuity of Care, Referral, Discharge [facility]         Is the patient interested in [facility] at discharge?: Yes  [facility] requested[de-identified] Nursing, Occupational Therapy, Physical [address] Name[de-identified] [address] [place] [address]  HHA External Referral Reason (only applicable if external HHA name selected): Patient has established relationship with provider  [facility] Provider[de-identified] PCP  Home Health Services Needed[de-identified] Evaluate Functional Status and Safety, Gait/ADL Training, Strengthening/Theraputic Exercises to Improve Function, Other (comment) (behavioral health)  Homebound Criteria Met[de-identified] Requires the Assistance of Another Person for Safe Ambulation or to Leave the Home, Uses an Assist Device (i.e. cane, walker, etc)  Supporting Clincal Findings[de-identified] Fatigues Easliy in [employer], Limited Endurance, Cognitive Deficit Requiring the Assistance of Others    DME Referral Provided  Referral made for DME?: No    Other Referral/Resources/Interventions Provided:  Interventions: Facility Return, [facility], Assisted Living  Referral Comments: Per MD, patient no longer stable for d/c today as psych wanting to monitor for another day. Call made to [email or web address] and relayed above to their nurse, Clemente Bush. Message sent to [address] [place] [address], patient's current home care agency, to relay d/c postponed until tomorrow. Call made to daughter, Indira Lancaster, to complete assessment and discuss anticipated d/c for tomorrow. Daughter confirmed that patient is a [facility] at [facility] and goal is for him to return there at d/c.  Reports that she lives in [place], so will need transportation arranged for his return. Reviewed home pharmacy, as [facility] relayed that patient normally uses [facility]. Daughter in agreement for new meds to be sent to [place] [facility]'s preferred [address] [place] [address] - so that meds can be obtained day-of-discharge; pharmacy added to patient's profile. Will follow-up with daughter in AM to confirm d/c and transportation back to [facility].     Would you like to participate in our [facility] [employer] service program?  : No - Declined    Treatment Team Recommendation: Facility Return, Assisted Living, Home with [address]  Discharge Destination Plan[de-identified] Assisted Living, Facility Return, Home with [address] ([facility] with [address])  Transport at Discharge : [address]                             IMM Given (Date):: 07/11/23  IMM Given to[de-identified] Family

## 2023-07-11 NOTE — PROGRESS NOTES
Progress Note - Geriatric Medicine   [facility] 80 y.o. male MRN: [id number]  Unit/Bed#: S -01 Encounter: [id number]      Assessment/Plan: Moderate vascular dementia with psychotic disturbance  · Known history - was at [facility]  • Is currently on BuSpar 10 mg twice daily, lorazepam 0.5 mg daily prn, Lexapro 10 mg daily   • Was recently restarted on Wellbutrin in June but has since been discontinued  • Is currently on Zyprexa 2.5 mg every 6 hours as needed  · Psych was consulted who recommended discontinuing Wellbutrin and starting Abilify 2 mg daily  • [place] completed yesterday was 12 out of 30  • Patient was oriented to place  At risk for delirium due to cognitive impairment  Recommend delirium precautions  Maintain sleep-wake cycle, avoid nighttime interruptions  minimize overnight interruptions, group overnight vitals/labs/nursing checks as possible  dim lights, close blinds and turn off tv to minimize stimulation and encourage sleep environment in evenings  Provide adequate pain control  Avoid urinary retention and constipation  Provide frequent and early mobilization  Provide frequent redirection and reorientation as needed  Avoid medications that may worsen or precipitate delirium such as tramadol, benzodiazepines, anticholinergics, and Benadryl  Redirect unwanted behaviors as first-line therapy, avoid physical restraints as able to  • Continue to monitor    Constipation  Last BM was yesterday  Is currently on Colace 100 mg daily  Encourage adequate p.o.  Hydration  Encourage prune juice as tolerated    Insomnia  First-line treatment is behavior modification  Maintain sleep-wake cycle, avoid nighttime interruptions  Avoid caffeine throughout the day  Avoid napping throughout the day  Encourage physical activity throughout the day  · Avoid sedative hypnotics including benzodiazepines and benadryl    Anxiety with depression  · Chronic  · Psych following  · Is currently on BuSpar 10 mg twice daily, Lexapro 10 mg daily, and Ativan 1.5 mg at bedtime as needed  · Psych recommended starting patient on Abilify 2 mg nightly  · Upon examination patient was having a depressed mood  · Continue to provide emotional support    Ambulatory dysfunction  At a baseline ambulates without assistance  PT/OT following  Fall precautions  Out of bed as tolerated  Encourage early and frequent mobilization  Encourage adequate hydration and nutrition  Provide adequate pain management   Goal is to turn to [place] [address]  · Continue with PT/OT for continued strength and balance training       Subjective:   Was being seen for geriatric follow-up. Upon examination patient was lying in his bed, resting. Patient was disoriented and depressed. He mentioned he was depressed about aging and his family. He is not in any physical pain right now, although stated he is mentally in pain. Stated that he did not sleep well last night. He got out of bed to move his bowels and go to the bathroom today. He denies burning with urination or difficulty urinating. As per nursing this has been in the past mood today, although is not baseline. No other acute issues or concerns at this time. Review of Systems   Unable to perform ROS: Mental status change   Constitutional: Positive for activity change. Respiratory: Negative for shortness of breath. Cardiovascular: Negative for chest pain. Gastrointestinal: Negative for abdominal pain, constipation, diarrhea, nausea and vomiting. Genitourinary: Negative for difficulty urinating, dysuria, frequency and urgency. Musculoskeletal: Positive for gait problem. Negative for arthralgias and back pain. Neurological: Positive for weakness. Negative for dizziness, light-headedness, numbness and headaches. Psychiatric/Behavioral: Positive for confusion, dysphoric mood and sleep disturbance. The patient is nervous/anxious.           Objective:     Vitals: Blood pressure 149/73, pulse 64, temperature 97.9 °F (36.6 °C), resp. rate 16, SpO2 97 %. ,There is no height or weight on file to calculate BMI. Intake/Output Summary (Last 24 hours) at 7/11/2023 1434  Last data filed at 7/10/2023 1552  Gross per 24 hour   Intake 120 ml   Output --   Net 120 ml       Current Medications: Reviewed    Physical Exam:   Physical Exam  Vitals reviewed. Constitutional:       General: He is not in acute distress. Appearance: He is normal weight. He is not ill-appearing. HENT:      Head: Normocephalic and atraumatic. Eyes:      General:         Right eye: No discharge. Left eye: No discharge. Cardiovascular:      Rate and Rhythm: Normal rate and regular rhythm. Pulses: Normal pulses. Heart sounds: Normal heart sounds. No murmur heard. Pulmonary:      Effort: Pulmonary effort is normal. No respiratory distress. Breath sounds: Normal breath sounds. No wheezing, rhonchi or rales. Abdominal:      General: Abdomen is flat. Bowel sounds are normal. There is no distension. Palpations: Abdomen is soft. Tenderness: There is no abdominal tenderness. There is no guarding. Musculoskeletal:         General: No tenderness or signs of injury. Right lower leg: No edema. Left lower leg: No edema. Skin:     General: Skin is warm and dry. Coloration: Skin is not jaundiced. Neurological:      Mental Status: He is alert. He is disoriented. Cranial Nerves: No cranial nerve deficit. Motor: Weakness present. Gait: Gait abnormal.   Psychiatric:         Mood and Affect: Mood is anxious and depressed. Invasive Devices     Peripheral Intravenous Line  Duration           Peripheral IV 07/09/23 Right;Ventral (anterior) Forearm 2 days                Lab, Imaging and other studies: I have personally reviewed pertinent reports. Scribed by [employer]. Note reviewed and history/physical exam taken with and completed by PREETI Hernandez.     Please note: Voice-recognition software may have been used in the preparation of this document. Occasional wrong word or "sound-alike" substitutions may have occurred due to the inherent limitations of voice recognition software. Interpretation should be guided by context.

## 2023-07-12 VITALS
RESPIRATION RATE: 18 BRPM | OXYGEN SATURATION: 97 % | HEART RATE: 61 BPM | DIASTOLIC BLOOD PRESSURE: 68 MMHG | SYSTOLIC BLOOD PRESSURE: 123 MMHG | TEMPERATURE: 97.6 F

## 2023-07-12 DIAGNOSIS — R46.89 BEHAVIORAL CHANGE: ICD-10-CM

## 2023-07-12 LAB
ANION GAP SERPL CALCULATED.3IONS-SCNC: 3 MMOL/L
BASOPHILS # BLD AUTO: 0.03 THOUSANDS/ÂΜL (ref 0–0.1)
BASOPHILS NFR BLD AUTO: 1 % (ref 0–1)
BUN SERPL-MCNC: 13 MG/DL (ref 5–25)
CALCIUM SERPL-MCNC: 9.1 MG/DL (ref 8.4–10.2)
CHLORIDE SERPL-SCNC: 107 MMOL/L (ref 96–108)
CO2 SERPL-SCNC: 31 MMOL/L (ref 21–32)
CREAT SERPL-MCNC: 0.85 MG/DL (ref 0.6–1.3)
EOSINOPHIL # BLD AUTO: 0.27 THOUSAND/ÂΜL (ref 0–0.61)
EOSINOPHIL NFR BLD AUTO: 5 % (ref 0–6)
ERYTHROCYTE [DISTWIDTH] IN BLOOD BY AUTOMATED COUNT: 13.6 % (ref 11.6–15.1)
GFR SERPL CREATININE-BSD FRML MDRD: 75 ML/MIN/1.73SQ M
GLUCOSE SERPL-MCNC: 88 MG/DL (ref 65–140)
GLUCOSE SERPL-MCNC: 92 MG/DL (ref 65–140)
HCT VFR BLD AUTO: 43.4 % (ref 36.5–49.3)
HGB BLD-MCNC: 14.3 G/DL (ref 12–17)
IMM GRANULOCYTES # BLD AUTO: 0.03 THOUSAND/UL (ref 0–0.2)
IMM GRANULOCYTES NFR BLD AUTO: 1 % (ref 0–2)
LYMPHOCYTES # BLD AUTO: 1.52 THOUSANDS/ÂΜL (ref 0.6–4.47)
LYMPHOCYTES NFR BLD AUTO: 29 % (ref 14–44)
MCH RBC QN AUTO: 33 PG (ref 26.8–34.3)
MCHC RBC AUTO-ENTMCNC: 32.9 G/DL (ref 31.4–37.4)
MCV RBC AUTO: 100 FL (ref 82–98)
MONOCYTES # BLD AUTO: 0.76 THOUSAND/ÂΜL (ref 0.17–1.22)
MONOCYTES NFR BLD AUTO: 14 % (ref 4–12)
NEUTROPHILS # BLD AUTO: 2.7 THOUSANDS/ÂΜL (ref 1.85–7.62)
NEUTS SEG NFR BLD AUTO: 50 % (ref 43–75)
NRBC BLD AUTO-RTO: 0 /100 WBCS
PLATELET # BLD AUTO: 221 THOUSANDS/UL (ref 149–390)
PMV BLD AUTO: 9.7 FL (ref 8.9–12.7)
POTASSIUM SERPL-SCNC: 4.3 MMOL/L (ref 3.5–5.3)
RBC # BLD AUTO: 4.33 MILLION/UL (ref 3.88–5.62)
SODIUM SERPL-SCNC: 141 MMOL/L (ref 135–147)
WBC # BLD AUTO: 5.31 THOUSAND/UL (ref 4.31–10.16)

## 2023-07-12 PROCEDURE — 80048 BASIC METABOLIC PNL TOTAL CA: CPT

## 2023-07-12 PROCEDURE — 99239 HOSP IP/OBS DSCHRG MGMT >30: CPT | Performed by: INTERNAL MEDICINE

## 2023-07-12 PROCEDURE — 99232 SBSQ HOSP IP/OBS MODERATE 35: CPT | Performed by: PSYCHIATRY & NEUROLOGY

## 2023-07-12 PROCEDURE — 82948 REAGENT STRIP/BLOOD GLUCOSE: CPT

## 2023-07-12 PROCEDURE — 85025 COMPLETE CBC W/AUTO DIFF WBC: CPT

## 2023-07-12 RX ORDER — ARIPIPRAZOLE 2 MG/1
2 TABLET ORAL DAILY
Qty: 30 TABLET | Refills: 0
Start: 2023-07-13 | End: 2023-07-12 | Stop reason: SDUPTHER

## 2023-07-12 RX ORDER — LORAZEPAM 0.5 MG/1
0.5 TABLET ORAL
Qty: 30 TABLET | Refills: 0 | Status: CANCELLED
Start: 2023-07-12 | End: 2023-08-11

## 2023-07-12 RX ORDER — LORAZEPAM 0.5 MG/1
0.5 TABLET ORAL DAILY
Qty: 5 TABLET | Refills: 0 | Status: SHIPPED | OUTPATIENT
Start: 2023-07-12 | End: 2023-07-17 | Stop reason: SDUPTHER

## 2023-07-12 RX ORDER — OLANZAPINE 2.5 MG/1
2.5 TABLET ORAL EVERY 6 HOURS PRN
Qty: 15 TABLET | Refills: 0
Start: 2023-07-12 | End: 2023-07-17

## 2023-07-12 RX ORDER — ARIPIPRAZOLE 2 MG/1
2 TABLET ORAL DAILY
Qty: 30 TABLET | Refills: 0 | Status: SHIPPED | OUTPATIENT
Start: 2023-07-13 | End: 2023-08-12

## 2023-07-12 RX ORDER — ACETAMINOPHEN 325 MG/1
975 TABLET ORAL EVERY 6 HOURS PRN
Qty: 30 TABLET | Refills: 0
Start: 2023-07-12 | End: 2023-07-22

## 2023-07-12 RX ORDER — OLANZAPINE 2.5 MG/1
2.5 TABLET ORAL EVERY 6 HOURS PRN
Qty: 30 TABLET | Refills: 0
Start: 2023-07-12 | End: 2023-07-12 | Stop reason: SDUPTHER

## 2023-07-12 RX ADMIN — FAMOTIDINE 20 MG: 20 TABLET ORAL at 08:34

## 2023-07-12 RX ADMIN — ENOXAPARIN SODIUM 40 MG: 40 INJECTION SUBCUTANEOUS at 08:34

## 2023-07-12 RX ADMIN — ARIPIPRAZOLE 2 MG: 2 TABLET ORAL at 08:35

## 2023-07-12 RX ADMIN — BUSPIRONE HYDROCHLORIDE 10 MG: 5 TABLET ORAL at 08:34

## 2023-07-12 RX ADMIN — ESCITALOPRAM OXALATE 10 MG: 10 TABLET ORAL at 08:34

## 2023-07-12 NOTE — CASE MANAGEMENT
Case Management Discharge Planning Note    Patient name Jack Milner  Location S /S -01 MRN [id number]  : 10/8/1930 Date 2023       Current Admission Date: 2023  Current Admission Diagnosis:Moderate vascular dementia with psychotic disturbance [facility])   Patient Active Problem List    Diagnosis Date Noted   • Frailty 2023   • Gastroesophageal reflux disease without esophagitis 2023   • Bilateral wrist pain 2023   • Right foot drop 09/15/2022   • Dysphagia 09/15/2022   • Anxiety 2022   • Fall 2022   • Slow transit constipation 2022   • Furuncle of scrotum 2021   • Thigh pain, musculoskeletal, right 2021   • Primary osteoarthritis of both shoulders 2021   • Moderate vascular dementia with psychotic disturbance ([address]) 2019   • Depression 2019   • Anxiety with depression 2019   • Ambulatory dysfunction 2019   • Insomnia 2019      LOS (days): 3  Geometric Mean LOS (GMLOS) (days): 4.70  Days to GMLOS:1.5     OBJECTIVE:  Risk of Unplanned Readmission Score: 11.51         Current admission status: Inpatient   Preferred Pharmacy:   [facility] ([address] [facility]  [address] [place] [address]  Phone: [phone] Fax: [address], [address] [place] [address]  Phone: [phone] Fax: [phone]    Primary Care Provider: Trine Bumpers, MD    Primary Insurance: MEDICARE  Secondary Insurance: AARP    DISCHARGE DETAILS:    Discharge planning discussed with[de-identified] Wandy Miramontes at [facility]; Kenya bailey, by phone  Freedom of Choice: Yes (re: facility return)  Comments - Freedom of Choice: Manjeet Peterson CM contacted family/caregiver?: Yes (daughterKenya, by phone)  Were Treatment Team discharge recommendations reviewed with patient/caregiver?: Yes  Did patient/caregiver verbalize understanding of patient care needs?: N/A- going to facility [facility] - assisted living)  Were patient/caregiver advised of the risks associated with not following Treatment Team discharge recommendations?: Yes    Contacts  Patient Contacts: Chang Catalan, daughter  Relationship to Patient[de-identified] Family  Contact Method: Phone  Phone Number: [phone]  Reason/Outcome: Emergency Contact, Continuity of Care, Referral, Discharge [facility]         Is the patient interested in [facility] at discharge?: Yes  [facility] requested[de-identified] [facility] Name[de-identified] [facility]  HHA External Referral Reason (only applicable if external HHA name selected): Patient has established relationship with provider  [facility] Provider[de-identified] PCP  Home Health Services Needed[de-identified] Evaluate Functional Status and Safety, Gait/ADL Training, Strengthening/Theraputic Exercises to Improve Function, Other (comment) (behavioral health)  Homebound Criteria Met[de-identified] Requires the Assistance of Another Person for Safe Ambulation or to Leave the Home, Uses an Assist Device (i.e. cane, walker, etc)  Supporting Clincal Findings[de-identified] Fatigues Easliy in [employer], Limited Endurance, Cognitive Deficit Requiring the Assistance of Others    DME Referral Provided  Referral made for DME?: No    Other Referral/Resources/Interventions Provided:  Interventions: Facility Return, Acute Rehab  Referral Comments: Per MD, patient ready for d/c today. Ai Zimmerman transport requested in [facility] for 1:30pm and confirmed for same time with [facility]. Call made to [facility] and spoke with Alban Simmons, who confirmed patient able to return today. Aware that AVS to be forwarded once complete. [facility] notified of [facility]/c in [address] - will forward AVS to them as well. MD aware orders needed for home care services for resumption of care.  Call made to patient's daughter, Chang Catalan, and reviewed d/c for today for patient to return to [facility]. Daughter in agreement with same and aware of transport arrangements above. No other d/c needs identified at this time.     Would you like to participate in our [facility] [employer] service program?  : No - Declined    Treatment Team Recommendation: Facility Return, Assisted Living, Home with [address]  Discharge Destination Plan[de-identified] [facility] Return, Home with [address] ([employer] with home care through [address])  Transport at Discharge : [address]: Yes  Number/Name of Dispatcher: [facility]  Transported by [facility] and Unit #): [facility]  ETA of Transport (Date): 07/12/23  ETA of Transport (Time): [phone] (confirmed)                            Accepting Facility Name, [facility] : [facility]  Receiving Facility/Agency Phone Number: [phone]  Facility/Agency Fax Number: [phone]

## 2023-07-12 NOTE — PLAN OF CARE
Problem: MOBILITY - ADULT  Goal: Maintain or return to baseline ADL function  Description: INTERVENTIONS:  -  Assess patient's ability to carry out ADLs; assess patient's baseline for ADL function and identify physical deficits which impact ability to perform ADLs (bathing, care of mouth/teeth, toileting, grooming, dressing, etc.)  - Assess/evaluate cause of self-care deficits   - Assess range of motion  - Assess patient's mobility; develop plan if impaired  - Assess patient's need for assistive devices and provide as appropriate  - Encourage maximum independence but intervene and supervise when necessary  - Involve family in performance of ADLs  - Assess for home care needs following discharge   - Consider OT consult to assist with ADL evaluation and planning for discharge  - Provide patient education as appropriate  Outcome: Progressing  Goal: Maintains/Returns to pre admission functional level  Description: INTERVENTIONS:  - Perform BMAT or MOVE assessment daily.   - Set and communicate daily mobility goal to care team and patient/family/caregiver. - Collaborate with rehabilitation services on mobility goals if consulted  - Perform Range of Motion  times a day. - Reposition patient every hours.   - Dangle patient  times a day  - Stand patient  times a day  - Ambulate patient  times a day  - Out of bed to chair  times a day   - Out of bed for meals  times a day  - Out of bed for toileting  - Record patient progress and toleration of activity level   Outcome: Progressing     Problem: PAIN - ADULT  Goal: Verbalizes/displays adequate comfort level or baseline comfort level  Description: Interventions:  - Encourage patient to monitor pain and request assistance  - Assess pain using appropriate pain scale  - Administer analgesics based on type and severity of pain and evaluate response  - Implement non-pharmacological measures as appropriate and evaluate response  - Consider cultural and social influences on pain and pain management  - Notify physician/advanced practitioner if interventions unsuccessful or patient reports new pain  Outcome: Progressing     Problem: INFECTION - ADULT  Goal: Absence or prevention of progression during hospitalization  Description: INTERVENTIONS:  - Assess and monitor for signs and symptoms of infection  - Monitor lab/diagnostic results  - Monitor all insertion sites, i.e. indwelling lines, tubes, and drains  - Monitor endotracheal if appropriate and nasal secretions for changes in amount and color  - Ethel appropriate cooling/warming therapies per order  - Administer medications as ordered  - Instruct and encourage patient and family to use good hand hygiene technique  - Identify and instruct in appropriate isolation precautions for identified infection/condition  Outcome: Progressing  Goal: Absence of fever/infection during neutropenic period  Description: INTERVENTIONS:  - Monitor WBC    Outcome: Progressing     Problem: SAFETY ADULT  Goal: Maintain or return to baseline ADL function  Description: INTERVENTIONS:  -  Assess patient's ability to carry out ADLs; assess patient's baseline for ADL function and identify physical deficits which impact ability to perform ADLs (bathing, care of mouth/teeth, toileting, grooming, dressing, etc.)  - Assess/evaluate cause of self-care deficits   - Assess range of motion  - Assess patient's mobility; develop plan if impaired  - Assess patient's need for assistive devices and provide as appropriate  - Encourage maximum independence but intervene and supervise when necessary  - Involve family in performance of ADLs  - Assess for home care needs following discharge   - Consider OT consult to assist with ADL evaluation and planning for discharge  - Provide patient education as appropriate  Outcome: Progressing  Goal: Maintains/Returns to pre admission functional level  Description: INTERVENTIONS:  - Perform BMAT or MOVE assessment daily.   - Set and communicate daily mobility goal to care team and patient/family/caregiver. - Collaborate with rehabilitation services on mobility goals if consulted  - Perform Range of Motion  times a day. - Reposition patient every  hours.   - Dangle patient  times a day  - Stand patient times a day  - Ambulate patient  times a day  - Out of bed to chair  times a day   - Out of bed for meals  times a day  - Out of bed for toileting  - Record patient progress and toleration of activity level   Outcome: Progressing  Goal: Patient will remain free of falls  Description: INTERVENTIONS:  - Educate patient/family on patient safety including physical limitations  - Instruct patient to call for assistance with activity   - Consult OT/PT to assist with strengthening/mobility   - Keep Call bell within reach  - Keep bed low and locked with side rails adjusted as appropriate  - Keep care items and personal belongings within reach  - Initiate and maintain comfort rounds  - Make Fall Risk Sign visible to staff  - Offer Toileting every  Hours, in advance of need  - Initiate/Maintain alarm  - Obtain necessary fall risk management equipmen  - Apply yellow socks and bracelet for high fall risk patients  - Consider moving patient to room near nurses station  Outcome: Progressing     Problem: DISCHARGE PLANNING  Goal: Discharge to home or other facility with appropriate resources  Description: INTERVENTIONS:  - Identify barriers to discharge w/patient and caregiver  - Arrange for needed discharge resources and transportation as appropriate  - Identify discharge learning needs (meds, wound care, etc.)  - Arrange for interpretive services to assist at discharge as needed  - Refer to Case Management Department for coordinating discharge planning if the patient needs post-hospital services based on physician/advanced practitioner order or complex needs related to functional status, cognitive ability, or social support system  Outcome: Progressing Problem: Knowledge Deficit  Goal: Patient/family/caregiver demonstrates understanding of disease process, treatment plan, medications, and discharge instructions  Description: Complete learning assessment and assess knowledge base.   Interventions:  - Provide teaching at level of understanding  - Provide teaching via preferred learning methods  Outcome: Progressing     Problem: Prexisting or High Potential for Compromised Skin Integrity  Goal: Skin integrity is maintained or improved  Description: INTERVENTIONS:  - Identify patients at risk for skin breakdown  - Assess and monitor skin integrity  - Assess and monitor nutrition and hydration status  - Monitor labs   - Assess for incontinence   - Turn and reposition patient  - Assist with mobility/ambulation  - Relieve pressure over bony prominences  - Avoid friction and shearing  - Provide appropriate hygiene as needed including keeping skin clean and dry  - Evaluate need for skin moisturizer/barrier cream  - Collaborate with interdisciplinary team   - Patient/family teaching  - Consider wound care consult   Outcome: Progressing

## 2023-07-12 NOTE — PLAN OF CARE
Problem: MOBILITY - ADULT  Goal: Maintain or return to baseline ADL function  Description: INTERVENTIONS:  -  Assess patient's ability to carry out ADLs; assess patient's baseline for ADL function and identify physical deficits which impact ability to perform ADLs (bathing, care of mouth/teeth, toileting, grooming, dressing, etc.)  - Assess/evaluate cause of self-care deficits   - Assess range of motion  - Assess patient's mobility; develop plan if impaired  - Assess patient's need for assistive devices and provide as appropriate  - Encourage maximum independence but intervene and supervise when necessary  - Involve family in performance of ADLs  - Assess for home care needs following discharge   - Consider OT consult to assist with ADL evaluation and planning for discharge  - Provide patient education as appropriate  7/12/2023 1304 by Angi Nunez RN  Outcome: Adequate for Discharge  7/12/2023 1145 by Angi Nunez RN  Outcome: Progressing  Goal: Maintains/Returns to pre admission functional level  Description: INTERVENTIONS:  - Perform BMAT or MOVE assessment daily.   - Set and communicate daily mobility goal to care team and patient/family/caregiver. - Collaborate with rehabilitation services on mobility goals if consulted  - Perform Range of Motion times a day. - Reposition patient ev hours.   - Dangle patient times a day  - Stand patient  times a day  - Ambulate patient  times a day  - Out of bed to chair times a day   - Out of bed for meals  times a day  - Out of bed for toileting  - Record patient progress and toleration of activity level   7/12/2023 1304 by Angi Nunez RN  Outcome: Adequate for Discharge  7/12/2023 1145 by Angi Nunez RN  Outcome: Progressing     Problem: PAIN - ADULT  Goal: Verbalizes/displays adequate comfort level or baseline comfort level  Description: Interventions:  - Encourage patient to monitor pain and request assistance  - Assess pain using appropriate pain scale  - Administer analgesics based on type and severity of pain and evaluate response  - Implement non-pharmacological measures as appropriate and evaluate response  - Consider cultural and social influences on pain and pain management  - Notify physician/advanced practitioner if interventions unsuccessful or patient reports new pain  7/12/2023 1304 by Agnes Todd RN  Outcome: Adequate for Discharge  7/12/2023 1145 by Agnes Todd RN  Outcome: Progressing     Problem: INFECTION - ADULT  Goal: Absence or prevention of progression during hospitalization  Description: INTERVENTIONS:  - Assess and monitor for signs and symptoms of infection  - Monitor lab/diagnostic results  - Monitor all insertion sites, i.e. indwelling lines, tubes, and drains  - Monitor endotracheal if appropriate and nasal secretions for changes in amount and color  - [place] appropriate cooling/warming therapies per order  - Administer medications as ordered  - Instruct and encourage patient and family to use good hand hygiene technique  - Identify and instruct in appropriate isolation precautions for identified infection/condition  7/12/2023 1304 by Agnse Todd RN  Outcome: Adequate for Discharge  7/12/2023 1145 by Agnes Todd RN  Outcome: Progressing  Goal: Absence of fever/infection during neutropenic period  Description: INTERVENTIONS:  - Monitor WBC    7/12/2023 1304 by Agnes Todd RN  Outcome: Adequate for Discharge  7/12/2023 1145 by Agnes Todd RN  Outcome: Progressing     Problem: SAFETY ADULT  Goal: Maintain or return to baseline ADL function  Description: INTERVENTIONS:  -  Assess patient's ability to carry out ADLs; assess patient's baseline for ADL function and identify physical deficits which impact ability to perform ADLs (bathing, care of mouth/teeth, toileting, grooming, dressing, etc.)  - Assess/evaluate cause of self-care deficits   - Assess range of motion  - Assess patient's mobility; develop plan if impaired  - Assess patient's need for assistive devices and provide as appropriate  - Encourage maximum independence but intervene and supervise when necessary  - Involve family in performance of ADLs  - Assess for home care needs following discharge   - Consider OT consult to assist with ADL evaluation and planning for discharge  - Provide patient education as appropriate  7/12/2023 1304 by Bri Erickson RN  Outcome: Adequate for Discharge  7/12/2023 1145 by Bri Erickson RN  Outcome: Progressing  Goal: Maintains/Returns to pre admission functional level  Description: INTERVENTIONS:  - Perform BMAT or MOVE assessment daily.   - Set and communicate daily mobility goal to care team and patient/family/caregiver. - Collaborate with rehabilitation services on mobility goals if consulted  - Perform Range of Motion  times a day. - Reposition patient every  hours.   - Dangle patient  times a day  - Stand patient  times a day  - Ambulate patient  times a day  - Out of bed to chair  times a day   - Out of bed for meals times a day  - Out of bed for toileting  - Record patient progress and toleration of activity level   7/12/2023 1304 by Bri Erickson RN  Outcome: Adequate for Discharge  7/12/2023 1145 by Bri Erickson RN  Outcome: Progressing  Goal: Patient will remain free of falls  Description: INTERVENTIONS:  - Educate patient/family on patient safety including physical limitations  - Instruct patient to call for assistance with activity   - Consult OT/PT to assist with strengthening/mobility   - Keep Call bell within reach  - Keep bed low and locked with side rails adjusted as appropriate  - Keep care items and personal belongings within reach  - Initiate and maintain comfort rounds  - Make Fall Risk Sign visible to staff  - Offer Toileting every  Hours, in advance of need  - Initiate/Maintain alarm  - Obtain necessary fall risk management equipment: - Apply yellow socks and bracelet for high fall risk patients  - Consider moving patient to room near nurses station  7/12/2023 1304 by Felecia Rosado RN  Outcome: Adequate for Discharge  7/12/2023 1145 by Felecia Rosado RN  Outcome: Progressing     Problem: DISCHARGE PLANNING  Goal: Discharge to home or other facility with appropriate resources  Description: INTERVENTIONS:  - Identify barriers to discharge w/patient and caregiver  - Arrange for needed discharge resources and transportation as appropriate  - Identify discharge learning needs (meds, wound care, etc.)  - Arrange for interpretive services to assist at discharge as needed  - Refer to Case Management Department for coordinating discharge planning if the patient needs post-hospital services based on physician/advanced practitioner order or complex needs related to functional status, cognitive ability, or social support system  7/12/2023 1304 by Felecia Rosado RN  Outcome: Adequate for Discharge  7/12/2023 1145 by Felecia Rosado RN  Outcome: Progressing     Problem: Knowledge Deficit  Goal: Patient/family/caregiver demonstrates understanding of disease process, treatment plan, medications, and discharge instructions  Description: Complete learning assessment and assess knowledge base.   Interventions:  - Provide teaching at level of understanding  - Provide teaching via preferred learning methods  7/12/2023 1304 by Felecia Rosado RN  Outcome: Adequate for Discharge  7/12/2023 1145 by Felecia Rosado RN  Outcome: Progressing     Problem: Prexisting or High Potential for Compromised Skin Integrity  Goal: Skin integrity is maintained or improved  Description: INTERVENTIONS:  - Identify patients at risk for skin breakdown  - Assess and monitor skin integrity  - Assess and monitor nutrition and hydration status  - Monitor labs   - Assess for incontinence   - Turn and reposition patient  - Assist with mobility/ambulation  - Relieve pressure over bony prominences  - Avoid friction and shearing  - Provide appropriate hygiene as needed including keeping skin clean and dry  - Evaluate need for skin moisturizer/barrier cream  - Collaborate with interdisciplinary team   - Patient/family teaching  - Consider wound care consult   7/12/2023 1304 by Anastasiia Crane RN  Outcome: Adequate for Discharge  7/12/2023 1145 by Anastasiia Crane RN  Outcome: Progressing     Problem: OCCUPATIONAL THERAPY ADULT  Goal: Performs self-care activities at highest level of function for planned discharge setting. See evaluation for individualized goals. Description: Treatment Interventions: ADL retraining, Functional transfer training, UE strengthening/ROM, Endurance training, Cognitive reorientation, Patient/family training, Equipment evaluation/education, Compensatory technique education, Energy conservation, Activityengagement          See flowsheet documentation for full assessment, interventions and recommendations.    Outcome: Adequate for Discharge

## 2023-07-12 NOTE — DISCHARGE INSTR - AVS FIRST PAGE
Dear Salvador Draper,     It was our pleasure to care for you here at [facility]. It is our hope that we were always able to exceed the expected standards for your care during your stay. You were hospitalized due to a behavioral disturbances. You were cared for on the third floor by Greta Black MD under the service of Elba Up MD with the [facility] who covers for your primary care physician (PCP), Antonio Nieves MD, while you were hospitalized. If you have any questions or concerns related to this hospitalization, you may contact us at [phone]. For follow up as well as any medication refills, we recommend that you follow up with your primary care physician. A registered nurse will reach out to you by phone within a few days after your discharge to answer any additional questions that you may have after going home. However, at this time we provide for you here, the most important instructions / recommendations at discharge:     Notable Medication Adjustments -   Aripiprazole 2 mg daily  Lorazepam 0.5 mg at nighttime as needed rather than every day in the morning  Olanzapine 2.5 mg every 6 as needed as needed for agitation  Testing Required after Discharge -   None  Important follow up information -   None  Please follow up with your primary care physician after discharge  Other Instructions -   Should your symptoms return, or you develop new concerning symptoms, please call your doctor and/or go to your nearest Emergency Department. Please review this entire after visit summary as additional general instructions including medication list, appointments, activity, diet, any pertinent wound care, and other additional recommendations from your care team that may be provided for you.       Sincerely,     Greta Black MD

## 2023-07-12 NOTE — CASE MANAGEMENT
Case Management Progress Note    Patient name Edelmira Mancia  Location S /S -01 MRN [id number]  : 10/8/1930 Date 2023       LOS (days): 3  Geometric Mean LOS (GMLOS) (days): 4.70  Days to GMLOS:1.5        OBJECTIVE:        Current admission status: Inpatient  Preferred Pharmacy:   [facility] ([address] [address]  Phone: [phone] Fax: [address] [facility], [place] [address]  Phone: [phone] Fax: [phone]    Primary Care Provider: Zoila Thurston MD    Primary Insurance: MEDICARE  [facility]    PROGRESS NOTE:    AVS faxed to [facility] ([phone]) for their records. Also forwarded to [address] [place] [address] in [address] [facility].

## 2023-07-12 NOTE — ASSESSMENT & PLAN NOTE
· Presents on admission due to changes in behavior that happened lately. Patient coming from [facility] for a care facility. Patient aggressive to staff, refusing care, walking naked outside the room.   · Patient scored a 12/30 on my MOCA today indicating moderate impairment  · Vitamin B12 low normal  · Folate normal  Progression of the dementia versus polypharmacy    Plan:  · Continue with Abilify 2 mg QD  · Discontinued wellbutrin  · Continue with BuSpar 10 milligrams twice daily  · C/w Lexapro 10 milligrams daily  · C/w Lorazepam 0.5 mg PRN at nighttime rather than scheduled daily

## 2023-07-12 NOTE — PROGRESS NOTES
Progress Note - Behavioral Health   Jose Maria Search 80 y.o. male MRN: [id number]  Unit/Bed#: S -01 Encounter: [id number]    Principal Psychiatric Problem:  1. Unspecified mood disorder  a. 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 Recommend no changes to psychiatric medications at this time, will defer to inpatient psychiatry management  2. Patient may return to supervised living facility per SLIM. 3. Please reach out to on-call Psychiatry team via [address] [place] [address] [address] [place] [place], or if after hours/Fri/Sat/Sunday contact on-call psychiatric service via [address] [place] [address] [phone] ([phone]) with any questions or concerns. ------------------------------------------------------------    Subjective:     Patient was seen and evaluated for continuity of care. Patient was seen seated in chair looking over a Do[employer]el[employer]anger paper with less psychomotor retardation and more spontaneous speech. He was perseverative on the paper and if he needed to sign the paper or if it would be sent to his home address. He reports "I'm very concerned" and "I don't wanna get myself in trouble with the hospital". Upon asking if patient was still feeling guilt, he stated yes. He continued to be tangential in thought process, though improving slowly and is less labile, less tearful in affect. He reports "I did too many wrong things". "I probably am the dumbest patient in the world because when I filled out my will I didn't know what I was doing". "I tried very hard to treat my girls right". He reports speaking with youngest daughter Cristy Flores today.  He reports he did eat some of his breakfast but "not too much" and was not still hungry at time of evaluation. He was unsure whether he slept adequately. Patient denies HI. When asked about SI he shared he did have an attempt years ago where he used a small knife and "stupidly tried to stick myself". Patient reports "I don't ever Cindy Lorena hurt myself" but "you just don't think sometimes". When asked if patient would notify staff if he developed thoughts to hurt himself, he stated "Ma'am I wouldn't do it here - it's the safest place". When asked where he would, he stated "I don't know. Patient denies AVH. He denies medication side effects at time of evaluation. Psychiatric Review of Systems:  Behavior over the last 24 hours: improving slowly  Sleep: patient reports he is unsure if he slept adequately  Appetite: adequate  Medication side effects: none verbalized  ROS: Complete review of systems is negative except as noted above.     Vital signs in last 24 hours:  Temp:  [97.6 °F (36.4 °C)-98 °F (36.7 °C)] 97.6 °F (36.4 °C)  HR:  [61-71] 61  Resp:  [16-18] 18  BP: (123)/(66-92) 123/68    Mental Status Exam:  Appearance:  alert, minimal eye contact, appears stated age, marginal grooming/hygiene, overweight, bearded and dressed in hospital attire   Behavior:  calm, more cooperative and sitting comfortably   Motor: Less psychomotor retardation and Unable to assess gait/balance as patient was sitting in bed   Speech:  More spontaneous, slow, normal volume and coherent   Mood:  "okay"   Affect:  less labile, less tearful, less dysphoric; slightly anxious    Thought Process:  Less disorganized, less tangential   Thought Content: mild paranoia, mood congruent delusions, though less overtly preoccupied with them, perseverative on medicare insurance paper   Perceptual disturbances: no reported hallucinations and does not appear to be responding to internal stimuli at this time   Risk Potential: No active suicidal ideation, No active homicidal ideation, Low potential for aggression based on previous behavior, possible passive death wishes   Cognition: appears to be of average intelligence, cognition not formally tested and oriented to self, place, month/year, city/state   Insight:  Limited   Judgment: Limited     Current Medications: All current medications have been reviewed. Current Facility-Administered Medications   Medication Dose Route Frequency Provider Last Rate   • acetaminophen  650 mg Oral Q6H PRN Claudeen Harris, MD     • ARIPiprazole  2 mg Oral Daily Juna Pablo Barber DO     • busPIRone  10 mg Oral BID Claudeen Harris, MD     • cyanocobalamin  1,000 mcg Intramuscular Q30 Days Valerio Garcia MD     • docusate sodium  100 mg Oral Daily Claudeen Harris, MD     • enoxaparin  40 mg Subcutaneous Daily Claudeen Harris, MD     • escitalopram  10 mg Oral Daily Claudeen Harris, MD     • famotidine  20 mg Oral Daily Claudeen Harris, MD     • fluticasone  1 spray Nasal Daily Claudeen Harris, MD     • LORazepam  0.5 mg Oral HS PRN Valerio Garcia MD     • OLANZapine  2.5 mg Oral Q6H PRN Miriam Li MD         Laboratory results:  I have personally reviewed all pertinent laboratory/tests results.   Recent Results (from the past 48 hour(s))   Basic metabolic panel    Collection Time: 07/12/23  6:15 AM   Result Value Ref Range    Sodium 141 135 - 147 mmol/L    Potassium 4.3 3.5 - 5.3 mmol/L    Chloride 107 96 - 108 mmol/L    CO2 31 21 - 32 mmol/L    ANION GAP 3 mmol/L    BUN 13 5 - 25 mg/dL    Creatinine 0.85 0.60 - 1.30 mg/dL    Glucose 88 65 - 140 mg/dL    Calcium 9.1 8.4 - 10.2 mg/dL    eGFR 75 ml/min/1.73sq m   CBC and differential    Collection Time: 07/12/23  6:15 AM   Result Value Ref Range    WBC 5.31 4.31 - 10.16 Thousand/uL    RBC 4.33 3.88 - 5.62 Million/uL    Hemoglobin 14.3 12.0 - 17.0 g/dL    Hematocrit 43.4 36.5 - 49.3 %     (H) 82 - 98 fL    MCH 33.0 26.8 - 34.3 pg    MCHC 32.9 31.4 - 37.4 g/dL    RDW 13.6 11.6 - 15.1 %    MPV 9.7 8.9 - 12.7 fL    Platelets 221 149 - 390 Thousands/uL    nRBC 0 /100 WBCs    Neutrophils Relative 50 43 - 75 %    Immat GRANS % 1 0 - 2 %    Lymphocytes Relative 29 14 - 44 %    Monocytes Relative 14 (H) 4 - 12 %    Eosinophils Relative 5 0 - 6 %    Basophils Relative 1 0 - 1 %    Neutrophils Absolute 2.70 1.85 - 7.62 Thousands/µL    Immature Grans Absolute 0.03 0.00 - 0.20 Thousand/uL    Lymphocytes Absolute 1.52 0.60 - 4.47 Thousands/µL    Monocytes Absolute 0.76 0.17 - 1.22 Thousand/µL    Eosinophils Absolute 0.27 0.00 - 0.61 Thousand/µL    Basophils Absolute 0.03 0.00 - 0.10 Thousands/µL   Fingerstick Glucose (POCT)    Collection Time: 07/12/23  7:48 AM   Result Value Ref Range    POC Glucose 92 65 - 140 mg/dl        Edson Whitley DO  Psychiatry Residency, PGY-2

## 2023-07-12 NOTE — PLAN OF CARE
Problem: INFECTION - ADULT  Goal: Absence or prevention of progression during hospitalization  Description: INTERVENTIONS:  - Assess and monitor for signs and symptoms of infection  - Monitor lab/diagnostic results  - Monitor all insertion sites, i.e. indwelling lines, tubes, and drains  - Monitor endotracheal if appropriate and nasal secretions for changes in amount and color  - [place] appropriate cooling/warming therapies per order  - Administer medications as ordered  - Instruct and encourage patient and family to use good hand hygiene technique  - Identify and instruct in appropriate isolation precautions for identified infection/condition  Outcome: Progressing  Goal: Absence of fever/infection during neutropenic period  Description: INTERVENTIONS:  - Monitor WBC    Outcome: Progressing     Problem: SAFETY ADULT  Goal: Maintain or return to baseline ADL function  Description: INTERVENTIONS:  -  Assess patient's ability to carry out ADLs; assess patient's baseline for ADL function and identify physical deficits which impact ability to perform ADLs (bathing, care of mouth/teeth, toileting, grooming, dressing, etc.)  - Assess/evaluate cause of self-care deficits   - Assess range of motion  - Assess patient's mobility; develop plan if impaired  - Assess patient's need for assistive devices and provide as appropriate  - Encourage maximum independence but intervene and supervise when necessary  - Involve family in performance of ADLs  - Assess for home care needs following discharge   - Consider OT consult to assist with ADL evaluation and planning for discharge  - Provide patient education as appropriate  Outcome: Progressing  Goal: Maintains/Returns to pre admission functional level  Description: INTERVENTIONS:  - Perform BMAT or MOVE assessment daily.   - Set and communicate daily mobility goal to care team and patient/family/caregiver.    - Collaborate with rehabilitation services on mobility goals if consulted  - Perform Range of Motion times a day. - Reposition patient every  hours.   - Dangle patient  times a day  - Stand patient  times a day  - Ambulate patient  times a day  - Out of bed to chair times a day   - Out of bed for meals  times a day  - Out of bed for toileting  - Record patient progress and toleration of activity level   Outcome: Progressing  Goal: Patient will remain free of falls  Description: INTERVENTIONS:  -  Assess patient's ability to carry out ADLs; assess patient's baseline for ADL function and identify physical deficits which impact ability to perform ADLs (bathing, care of mouth/teeth, toileting, grooming, dressing, etc.)  - Assess/evaluate cause of self-care deficits   - Assess range of motion  - Assess patient's mobility; develop plan if impaired  - Assess patient's need for assistive devices and provide as appropriate  - Encourage maximum independence but intervene and supervise when necessary  - Involve family in performance of ADLs  - Assess for home care needs following discharge   - Consider OT consult to assist with ADL evaluation and planning for discharge  - Provide patient education as appropriate  Outcome: Progressing

## 2023-07-12 NOTE — DISCHARGE SUMMARY
[facility]  Discharge- [facility] Levels 10/8/1930, 80 y.o. male MRN: [id number]  Unit/Bed#: S -01 Encounter: [id number]  Primary Care Provider: Rebecca Figueroa MD   Date and time admitted to hospital: 7/9/2023 12:43 AM    * Moderate vascular dementia with psychotic disturbance [facility])  Assessment & Plan  · Presents on admission due to changes in behavior that happened lately. Patient coming from [facility] for a care facility. Patient aggressive to staff, refusing care, walking naked outside the room. · Patient scored a 12/30 on my [address] today indicating moderate impairment  · Vitamin B12 low normal  · Folate normal  Progression of the dementia versus polypharmacy    Plan:  · Continue with Abilify 2 mg QD  · Discontinued wellbutrin  · Continue with BuSpar 10 milligrams twice daily  · C/w Lexapro 10 milligrams daily  · C/w Lorazepam 0.5 mg PRN at nighttime rather than scheduled daily           Anxiety with depression  Assessment & Plan  Medication adjustments described above.       Frailty  Assessment & Plan  Frailty  · Returning to [facility]   · They will manage this with PT and OT    Gastroesophageal reflux disease without esophagitis  Assessment & Plan    · Continue with famotidine 20 mg daily       Slow transit constipation  Assessment & Plan  Continue with Colace 100 mg daily after discharge    Insomnia  Assessment & Plan  First-line treatment is behavior modification  · Continue with lorazepam 0.5 mg  as needed at night    Ambulatory dysfunction  Assessment & Plan      • Return to [facility] today   • continue with PT/OT for continued strength and balance training at nursing home    Medical Problems     Resolved Problems  Date Reviewed: 7/10/2023   None       Discharging Resident: Zoë Mcghee MD  Discharging Attending: Mindi Slater MD  PCP: Rebecca Figueroa MD  Admission Date:   Admission Orders (From admission, onward)     Ordered        07/09/23 7434 INPATIENT ADMISSION  Once                      Discharge Date: 07/12/23    Consultations During Hospital Stay:  · None    Procedures Performed:   · None    Significant Findings / Test Results:   · [facility] 12/30    Incidental Findings:   · None       Test Results Pending at Discharge (will require follow up): · None     Outpatient Tests Requested:  · None    Complications: None    Reason for Admission: Altered mental status in the setting of previous history of dementia     Hospital Course:   Jeanette Parikh is a 80 y.o. male patient who was hospitalized from 7/9/2023 through 7/12/2023 with the admitting diagnosis of altered mental status in the setting of dementia. He was found to be disoriented in his nursing home and aggressive towards staff and at times walking around naked. However, in the hospital after a one-time dose of Zyprexa 10 mg IV he was very well behaved and presented no problems to hospital staff. His MoCA score was a 12 out of 30 indicating moderate cognitive impairment. Geriatrics and psychiatry were consulted and provided recommendations. He was suggested that we add Abilify 2 mg daily to his regimen and discontinue the Wellbutrin 100 mg twice daily along with changing his from 0.5 mg in the morning to 0.5 mg as needed at night. He was discharged back to his nursing home at [facility]. Please see above list of diagnoses and related plan for additional information. Condition at Discharge: good    Discharge Day Visit / Exam:   Subjective: Patient examined at bedside. Had no complaints for me.   Feels well; however, "states that he thinks that bad things are coming and that he does not like how people have to take care of him"    Vitals: Blood Pressure: 123/68 (07/12/23 0747)  Pulse: 61 (07/12/23 0747)  Temperature: 97.6 °F (36.4 °C) (07/12/23 0747)  Temp Source: Oral (07/12/23 0747)  Respirations: 18 (07/12/23 0747)  SpO2: 97 % (07/12/23 0747)  Exam:   Physical Exam  Vitals and nursing note reviewed. Constitutional:       General: He is not in acute distress. Appearance: He is not ill-appearing or toxic-appearing. HENT:      Head: Normocephalic. Nose: Nose normal. No congestion or rhinorrhea. Mouth/Throat:      Mouth: Mucous membranes are moist.      Pharynx: Oropharynx is clear. No oropharyngeal exudate or posterior oropharyngeal erythema. Eyes:      Extraocular Movements: Extraocular movements intact. Conjunctiva/sclera: Conjunctivae normal.   Cardiovascular:      Rate and Rhythm: Normal rate and regular rhythm. Pulses: Normal pulses. Heart sounds: Normal heart sounds. No murmur heard. No gallop. Pulmonary:      Effort: Pulmonary effort is normal.      Breath sounds: Normal breath sounds. No wheezing, rhonchi or rales. Abdominal:      General: Bowel sounds are normal.      Palpations: Abdomen is soft. Tenderness: There is no abdominal tenderness. There is no guarding or rebound. Musculoskeletal:         General: Normal range of motion. Cervical back: Normal range of motion. Right lower leg: No edema. Left lower leg: No edema. Skin:     General: Skin is warm. Capillary Refill: Capillary refill takes less than 2 seconds. Neurological:      Mental Status: He is alert. Comments: Oriented to person and place   Psychiatric:      Comments: Demented          Discussion with Family: Updated  (daughter) via phone. Discharge instructions/Information to patient and family:   See after visit summary for information provided to patient and family. Provisions for Follow-Up Care:  See after visit summary for information related to follow-up care and any pertinent home health orders. Disposition:   Other [facility] Readmission: No    Discharge Medications:  See after visit summary for reconciled discharge medications provided to patient and/or family. **Please Note: This note may have been constructed using a voice recognition system**

## 2023-07-12 NOTE — ASSESSMENT & PLAN NOTE
• [facility]   • continue with PT/OT for continued strength and balance training at nursing home

## 2023-07-13 ENCOUNTER — TELEPHONE (OUTPATIENT)
Age: 88
End: 2023-07-13

## 2023-07-13 NOTE — TELEPHONE ENCOUNTER
Emily ABEBE with [address] ([phone]) called to advise case was opened today for [facility] home care nursing services.

## 2023-07-17 DIAGNOSIS — F41.9 ANXIETY: ICD-10-CM

## 2023-07-17 RX ORDER — LORAZEPAM 0.5 MG/1
0.5 TABLET ORAL DAILY
Qty: 30 TABLET | Refills: 0 | Status: SHIPPED | OUTPATIENT
Start: 2023-07-17 | End: 2023-07-22 | Stop reason: SDUPTHER

## 2023-07-22 ENCOUNTER — TELEPHONE (OUTPATIENT)
Dept: OTHER | Facility: OTHER | Age: 88
End: 2023-07-22

## 2023-07-22 DIAGNOSIS — F41.9 ANXIETY: ICD-10-CM

## 2023-07-22 RX ORDER — LORAZEPAM 0.5 MG/1
0.5 TABLET ORAL DAILY
Qty: 30 TABLET | Refills: 0 | Status: SHIPPED | OUTPATIENT
Start: 2023-07-22

## 2023-08-01 DIAGNOSIS — R46.89 BEHAVIORAL CHANGE: ICD-10-CM

## 2023-08-01 RX ORDER — ARIPIPRAZOLE 2 MG/1
TABLET ORAL
Qty: 30 TABLET | Refills: 11 | Status: SHIPPED | OUTPATIENT
Start: 2023-08-01

## 2023-08-18 DIAGNOSIS — F41.9 ANXIETY: ICD-10-CM

## 2023-08-18 RX ORDER — LORAZEPAM 0.5 MG/1
0.5 TABLET ORAL DAILY
Qty: 30 TABLET | Refills: 0 | Status: SHIPPED | OUTPATIENT
Start: 2023-08-18 | End: 2023-09-17

## 2023-08-31 ENCOUNTER — NURSING HOME VISIT (OUTPATIENT)
Dept: GERIATRICS | Facility: OTHER | Age: 88
End: 2023-08-31
Payer: MEDICARE

## 2023-08-31 DIAGNOSIS — R54 FRAILTY: ICD-10-CM

## 2023-08-31 DIAGNOSIS — K21.9 GASTROESOPHAGEAL REFLUX DISEASE WITHOUT ESOPHAGITIS: ICD-10-CM

## 2023-08-31 DIAGNOSIS — F01.B2 MODERATE VASCULAR DEMENTIA WITH PSYCHOTIC DISTURBANCE (HCC): Primary | ICD-10-CM

## 2023-08-31 DIAGNOSIS — K59.01 SLOW TRANSIT CONSTIPATION: ICD-10-CM

## 2023-08-31 PROCEDURE — 99349 HOME/RES VST EST MOD MDM 40: CPT | Performed by: NURSE PRACTITIONER

## 2023-08-31 NOTE — PROGRESS NOTES
[address]  [phone]  [address]  [address]        NAME: Christina Carrion  AGE: 80 y.o. SEX: male CODE STATUS: CPR    DATE OF ENCOUNTER: 08/31/23    Assessment and Plan     1. Moderate vascular dementia with psychotic disturbance [facility])  Assessment & Plan:  · Recent [place] 7/12/2023 12 out of 30  · Mood stable on current regimen  · Continue Abilify 2 mg daily  · Continue BuSpar 10 mg daily  · Continue Lexapro 10 mg daily  · Continue lorazepam 0.5 mg daily    Provide redirection, reorientation, distraction techniques  Fall Precautions  Assist with ADLs/IADLs  Avoid deliriogenic medications such as tramadol, benzodiazepines, anticholinergics, benadryl  Encourage Hydration/ Nutrition  Implement sleep hygiene, limit night time interuptions   Encourage participation in group activities when appropriate       2. Gastroesophageal reflux disease without esophagitis  Assessment & Plan:  · Denies symptoms on exam  · Should patient develop symptoms can trial famotidine  · Offered Tums as needed      3. Slow transit constipation  Assessment & Plan:  · Patient denies any constipation on exam  · Continue Colace daily  · Encourage nutrition/hydration      4. Frailty  Assessment & Plan:  · In the setting of advanced age and Alzheimer's dementia  · Continue restorative PT  · Encourage nutrition/hydration  · Fall precautions         All medications and routine orders were reviewed and updated as needed. Chief Complaint     LTC follow up visit     History of Present Illness     Christina Carrion is a 80-year-old male, he is a LTC resident of [address] [place] [address]. Past Medical Hx including but not limited to Dementia Anxiety/Depression, Ambulatory dysfuncion, Insomnia  seen in collaboration with nursing for medical mgmt and LTC follow up. Seen and examined at [facility]. Patient is seen in health center today. Patient is complaining of right eye being blurry.   Right [facility] was [facility] with improvement. Patient offers no other complaints at this time except for chronic right leg pain. Staff state patient is eating and drinking well. Patient was previously started on Abilify and staff stated his mood has improved significantly and he is back to his baseline. Patient continues to ambulate without assistive device. Per staff/patient - No bowel or bladder concerns. Staff have no other concerns at this time. The patient's allergies, past medical, surgical, social and family history were reviewed and unchanged. Review of Systems     Review of Systems   Unable to perform ROS: Dementia         Objective     Vitals:   Vitals:    08/31/23 1611   BP: 107/50   Pulse: 78   Resp: 20   Temp: (!) 97.2 °F (36.2 °C)   SpO2: 97%         Physical Exam  Vitals and nursing note reviewed. Constitutional:       General: He is not in acute distress. Appearance: Normal appearance. HENT:      Head: Normocephalic and atraumatic. Nose: No congestion or rhinorrhea. Mouth/Throat:      Mouth: Mucous membranes are moist.   Eyes:      General: No scleral icterus. Extraocular Movements: Extraocular movements intact. Conjunctiva/sclera: Conjunctivae normal.      Pupils: Pupils are equal, round, and reactive to light. Cardiovascular:      Rate and Rhythm: Normal rate and regular rhythm. Pulses: Normal pulses. Heart sounds: Normal heart sounds. No murmur heard. Pulmonary:      Effort: Pulmonary effort is normal.      Breath sounds: Normal breath sounds. No wheezing, rhonchi or rales. Abdominal:      General: Bowel sounds are normal. There is no distension. Palpations: Abdomen is soft. Tenderness: There is no abdominal tenderness. Musculoskeletal:         General: No swelling or signs of injury. Comments: Right foot drop  Bilateral hand weakness   Lymphadenopathy:      Cervical: No cervical adenopathy.    Skin:     General: Skin is warm and dry.      Findings: No erythema. Neurological:      Mental Status: He is alert. Mental status is at baseline. He is disoriented. Sensory: No sensory deficit. Motor: Weakness present. Gait: Gait abnormal.      Comments: AAOx person and place - clear speech. Forgetful. Psychiatric:         Mood and Affect: Mood normal.         Behavior: Behavior normal.         Pertinent Laboratory/Diagnostic Studies:  Reviewed in facility chart-stable     Current Medications   Medications reviewed and updated see facility [facility] for details. Current Outpatient Medications:   •  ARIPiprazole (ABILIFY) 2 mg tablet, TAKE 1 TABLET BY MOUTH DAILY . DO NOT START BEFORE JULY 13, 2023, Disp: 30 tablet, Rfl: 11  •  busPIRone (BUSPAR) 10 mg tablet, Take 1 tablet (10 mg total) by mouth 2 (two) times a day, Disp: 180 tablet, Rfl: 3  •  docusate sodium (COLACE) 100 mg capsule, Take 1 capsule (100 mg total) by mouth in the morning, Disp: 90 capsule, Rfl: 3  •  escitalopram (LEXAPRO) 10 mg tablet, Take 1 tablet (10 mg total) by mouth daily, Disp: 90 tablet, Rfl: 3  •  famotidine (PEPCID) 20 mg tablet, Take 20 mg by mouth in the morning, Disp: , Rfl:   •  fluticasone (FLONASE) 50 mcg/act nasal spray, 1 spray into each nostril daily, Disp: , Rfl:   •  LORazepam (ATIVAN) 0.5 mg tablet, Take 1 tablet (0.5 mg total) by mouth in the morning, Disp: 30 tablet, Rfl: 0  •  OLANZapine (ZyPREXA) 2.5 mg tablet, Take 1 tablet (2.5 mg total) by mouth every 6 (six) hours as needed (For agitation) for up to 5 days, Disp: 15 tablet, Rfl: 0       Please note:  Voice-recognition software may have been used in the preparation of this document. Occasional wrong word or "sound-alike" substitutions may have occurred due to the inherent limitations of voice recognition software. Interpretation should be guided by context.        PREETI Chance  08/31/23

## 2023-09-12 VITALS
SYSTOLIC BLOOD PRESSURE: 107 MMHG | OXYGEN SATURATION: 97 % | TEMPERATURE: 97.2 F | BODY MASS INDEX: 26.45 KG/M2 | RESPIRATION RATE: 20 BRPM | HEART RATE: 78 BPM | WEIGHT: 140 LBS | DIASTOLIC BLOOD PRESSURE: 50 MMHG

## 2023-09-12 NOTE — ASSESSMENT & PLAN NOTE
· Denies symptoms on exam  · Should patient develop symptoms can trial famotidine  · Offered Tums as needed

## 2023-09-12 NOTE — ASSESSMENT & PLAN NOTE
· Recent [facility] 7/12/2023 12 out of 30  · Mood stable on current regimen  · Continue Abilify 2 mg daily  · Continue BuSpar 10 mg daily  · Continue Lexapro 10 mg daily  · Continue lorazepam 0.5 mg daily    Provide redirection, reorientation, distraction techniques  Fall Precautions  Assist with ADLs/IADLs  Avoid deliriogenic medications such as tramadol, benzodiazepines, anticholinergics, benadryl  Encourage Hydration/ Nutrition  Implement sleep hygiene, limit night time interuptions   Encourage participation in group activities when appropriate

## 2023-09-12 NOTE — ASSESSMENT & PLAN NOTE
· In the setting of advanced age and Alzheimer's dementia  · Continue restorative PT  · Encourage nutrition/hydration  · Fall precautions

## 2023-09-22 DIAGNOSIS — F41.9 ANXIETY: ICD-10-CM

## 2023-09-22 RX ORDER — LORAZEPAM 0.5 MG/1
0.5 TABLET ORAL DAILY
Qty: 60 TABLET | Refills: 0 | Status: SHIPPED | OUTPATIENT
Start: 2023-09-22 | End: 2023-11-21

## 2023-10-26 ENCOUNTER — NURSING HOME VISIT (OUTPATIENT)
Dept: GERIATRICS | Facility: OTHER | Age: 88
End: 2023-10-26
Payer: MEDICARE

## 2023-10-26 DIAGNOSIS — M19.012 PRIMARY OSTEOARTHRITIS OF BOTH SHOULDERS: ICD-10-CM

## 2023-10-26 DIAGNOSIS — M19.011 PRIMARY OSTEOARTHRITIS OF BOTH SHOULDERS: ICD-10-CM

## 2023-10-26 DIAGNOSIS — F01.B2 MODERATE VASCULAR DEMENTIA WITH PSYCHOTIC DISTURBANCE (HCC): Primary | ICD-10-CM

## 2023-10-26 DIAGNOSIS — F41.8 ANXIETY WITH DEPRESSION: ICD-10-CM

## 2023-10-26 PROCEDURE — 99349 HOME/RES VST EST MOD MDM 40: CPT | Performed by: FAMILY MEDICINE

## 2023-10-26 NOTE — PROGRESS NOTES
[address]  [phone]  [address]  [address]      NAME: Amadeo Concepcion  AGE: 80 y.o. SEX: male [id number]    DATE OF ENCOUNTER: 11/16/2023    Assessment and Plan     1. Moderate vascular dementia with psychotic disturbance (HCC)  - redirection, reorientation  - staff says his behaviors got better  - mod assistance with ADLs  - cont Aripiprazole 2 mg po qd    2. Anxiety with depression  - stable  - cont Buspirone 10 mg po bid  - cont Escitalopram 20 mg po qd  - cont Lorazepam 0.5 mg qd (may d/c)    3. Primary osteoarthritis of both shoulders  - cont Tylenol as ordered. - Counseling Documentation: patient was counseled regarding: prognosis    Chief Complaint     Routine Long term follow-up visit. History of Present Illness     HPI  Amadeo Concepcion, a 81 y/o male is a [facility]. He found sitting at the activities table. He is verbal with intermittent confusion. He needs mod assistance with ADLs. He doesnot use any assistive devices. Staff have no concerns at this time. Denies any recent falls or hospitalizations. The following portions of the patient's history were reviewed and updated as appropriate: allergies, current medications, past family history, past medical history, past social history, past surgical history and problem list.    Review of Systems     Review of Systems   Unable to perform ROS: Dementia   As in HPI.     Active Problem List     Patient Active Problem List   Diagnosis   • Moderate vascular dementia with psychotic disturbance (HCC)   • Depression   • Anxiety with depression   • Ambulatory dysfunction   • Insomnia   • Primary osteoarthritis of both shoulders   • Thigh pain, musculoskeletal, right   • Furuncle of scrotum   • Slow transit constipation   • Anxiety   • Fall   • Right foot drop   • Dysphagia   • Bilateral wrist pain   • Gastroesophageal reflux disease without esophagitis   • Frailty Objective     Vitals: T: 98.2, P: 90, R: 16, BP: 134/80, 96% on RA, Wt: 141 lbs    Physical Exam  Vitals and nursing note reviewed. Constitutional:       General: He is not in acute distress. Appearance: Normal appearance. He is well-developed. He is not diaphoretic. HENT:      Head: Normocephalic and atraumatic. Nose: Nose normal.      Mouth/Throat:      Mouth: Mucous membranes are moist.      Pharynx: Oropharynx is clear. No oropharyngeal exudate. Eyes:      General: No scleral icterus. Right eye: No discharge. Left eye: No discharge. Extraocular Movements: Extraocular movements intact. Conjunctiva/sclera: Conjunctivae normal.   Cardiovascular:      Rate and Rhythm: Normal rate and regular rhythm. Heart sounds: Normal heart sounds. No murmur heard. Pulmonary:      Effort: Pulmonary effort is normal. No respiratory distress. Breath sounds: Normal breath sounds. No wheezing. Chest:      Chest wall: No tenderness. Abdominal:      General: Bowel sounds are normal. There is no distension. Palpations: Abdomen is soft. Tenderness: There is no abdominal tenderness. There is no guarding. Musculoskeletal:         General: No tenderness or deformity. Normal range of motion. Cervical back: Normal range of motion. Right lower leg: No edema. Left lower leg: No edema. Skin:     General: Skin is warm and dry. Neurological:      Mental Status: He is alert. Cranial Nerves: No cranial nerve deficit. Motor: No abnormal muscle tone. Coordination: Coordination normal.      Comments: oriented to self  Verbal  Able to follow simple commands  Interacts well. Psychiatric:         Mood and Affect: Mood normal.         Behavior: Behavior normal.      Comments: With intermittent confusion. Pertinent Laboratory/Diagnostic Studies:  Refer to facility chart.   Labs done on 6/8/23, WNL    Current Medications   Medications reviewed and updated in facility chart.

## 2023-11-24 DIAGNOSIS — F41.9 ANXIETY: ICD-10-CM

## 2023-11-24 RX ORDER — LORAZEPAM 0.5 MG/1
0.5 TABLET ORAL DAILY
Qty: 30 TABLET | Refills: 0 | Status: SHIPPED | OUTPATIENT
Start: 2023-11-24 | End: 2023-12-24

## 2023-12-27 DIAGNOSIS — F41.9 ANXIETY: ICD-10-CM

## 2023-12-27 RX ORDER — LORAZEPAM 0.5 MG/1
0.5 TABLET ORAL DAILY
Qty: 30 TABLET | Refills: 0 | Status: SHIPPED | OUTPATIENT
Start: 2023-12-27 | End: 2024-01-26

## 2023-12-30 ENCOUNTER — NURSING HOME VISIT (OUTPATIENT)
Dept: GERIATRICS | Facility: OTHER | Age: 88
End: 2023-12-30

## 2023-12-30 DIAGNOSIS — R26.2 AMBULATORY DYSFUNCTION: ICD-10-CM

## 2023-12-30 DIAGNOSIS — F41.8 ANXIETY WITH DEPRESSION: Primary | ICD-10-CM

## 2023-12-30 DIAGNOSIS — F01.B2 MODERATE VASCULAR DEMENTIA WITH PSYCHOTIC DISTURBANCE (HCC): ICD-10-CM

## 2023-12-30 DIAGNOSIS — K59.01 SLOW TRANSIT CONSTIPATION: ICD-10-CM

## 2023-12-31 NOTE — PROGRESS NOTES
[facility]  [address] [id number]  [phone]  [facility]  POS [address]      NAME: Adilson Lopez  AGE: 89 y.o. SEX: male [id number]    DATE OF ENCOUNTER: 12/30/2023    Assessment and Plan     1. Anxiety with depression  - stable  - cont Abilify 2 mg po qd  - cont Buspirone 10 mg po bid  - cont Escitalopram 20 mg po qd  - cont Lorazepam 0.5 mg po qd    2. Moderate vascular dementia with psychotic disturbance (HCC)  - Redirection, reorientation  - assistance with ADLs  - cont Abilify and Buspirone    3. Slow transit constipation  - stable  - encourage po hydration    4. Ambulatory dysfunction  - fall precautions in place  - No assistive devices used      - Counseling Documentation: patient was counseled regarding: risk factor reductions    Chief Complaint     Routine Long term follow-up visit.    History of Present Illness     HPI  Adilson Lopez, a 89 y/o male is a long term resident at [facility], seen and examined, stable. He found walking in the dining room, helping the staff to setup tables. He have no c/o at this time. He doesnot use any assistive devices. He needs mod assistance with ADLs. He is eating and sleeping well.   Staff have no c/o at this time.    The following portions of the patient's history were reviewed and updated as appropriate: allergies, current medications, past family history, past medical history, past social history, past surgical history and problem list.    Review of Systems     Review of Systems   Constitutional:  Negative for activity change and fatigue.   HENT:  Positive for hearing loss.    Eyes: Negative.    Respiratory: Negative.     Cardiovascular: Negative.    Gastrointestinal: Negative.    Genitourinary: Negative.    Musculoskeletal:  Negative for arthralgias and gait problem.   Neurological:  Positive for weakness.   Psychiatric/Behavioral: Negative.     As in HPI.    Active Problem List     Patient Active Problem List   Diagnosis   • Moderate  vascular dementia with psychotic disturbance (HCC)   • Depression   • Anxiety with depression   • Ambulatory dysfunction   • Insomnia   • Primary osteoarthritis of both shoulders   • Thigh pain, musculoskeletal, right   • Furuncle of scrotum   • Slow transit constipation   • Anxiety   • Fall   • Right foot drop   • Dysphagia   • Bilateral wrist pain   • Gastroesophageal reflux disease without esophagitis   • Frailty       Objective     Vitals: T: 97.5, P: 65, R: 16, BP: 102/55, 95% on RA, Wt: 138.4 lbs    Physical Exam  Vitals and nursing note reviewed.   Constitutional:       General: He is not in acute distress.     Appearance: Normal appearance. He is well-developed. He is not diaphoretic.   HENT:      Head: Normocephalic and atraumatic.      Nose: Nose normal.      Mouth/Throat:      Mouth: Mucous membranes are moist.      Pharynx: Oropharynx is clear. No oropharyngeal exudate.   Eyes:      General: No scleral icterus.        Right eye: No discharge.         Left eye: No discharge.      Extraocular Movements: Extraocular movements intact.      Conjunctiva/sclera: Conjunctivae normal.   Cardiovascular:      Rate and Rhythm: Normal rate and regular rhythm.      Heart sounds: Normal heart sounds. No murmur heard.  Pulmonary:      Effort: Pulmonary effort is normal. No respiratory distress.      Breath sounds: Normal breath sounds. No wheezing.   Chest:      Chest wall: No tenderness.   Abdominal:      General: Bowel sounds are normal. There is no distension.      Palpations: Abdomen is soft.      Tenderness: There is no abdominal tenderness. There is no guarding.   Musculoskeletal:         General: No tenderness or deformity. Normal range of motion.      Cervical back: Normal range of motion.      Right lower leg: No edema.      Left lower leg: No edema.   Skin:     General: Skin is warm and dry.   Neurological:      Mental Status: He is alert.      Cranial Nerves: No cranial nerve deficit.      Motor: No abnormal  muscle tone.      Coordination: Coordination normal.      Comments: Oriented to self  Verbal  Able to follow commands   Psychiatric:         Behavior: Behavior normal.      Comments: Intermittent confusion.         Pertinent Laboratory/Diagnostic Studies:  Refer to facility chart.    Current Medications   Medications reviewed and updated in facility chart.

## 2024-01-16 DIAGNOSIS — F41.9 ANXIETY: ICD-10-CM

## 2024-01-16 RX ORDER — LORAZEPAM 0.5 MG/1
0.5 TABLET ORAL DAILY
Qty: 30 TABLET | Refills: 0 | Status: SHIPPED | OUTPATIENT
Start: 2024-01-22 | End: 2024-02-21

## 2024-01-29 ENCOUNTER — TELEPHONE (OUTPATIENT)
Age: 89
End: 2024-01-29

## 2024-01-29 NOTE — TELEPHONE ENCOUNTER
[facility] ([phone]) needs patient's insurance information and face sheet in order to start services.     Please fax to [phone]; Attention: Fred

## 2024-01-29 NOTE — TELEPHONE ENCOUNTER
Radha with [facility] ([phone]) would like to know if it will be okay to start the patient on services this Friday, 2/2/24.

## 2024-01-29 NOTE — TELEPHONE ENCOUNTER
Contacted  at [facility] to please fax face sheet and insurance information to Nidia with [facility] at [phone], so that patient can be scheduled for services.

## 2024-02-15 ENCOUNTER — NURSING HOME VISIT (OUTPATIENT)
Dept: GERIATRICS | Facility: OTHER | Age: 89
End: 2024-02-15
Payer: MEDICARE

## 2024-02-15 DIAGNOSIS — F41.8 ANXIETY WITH DEPRESSION: ICD-10-CM

## 2024-02-15 DIAGNOSIS — M19.012 PRIMARY OSTEOARTHRITIS OF BOTH SHOULDERS: ICD-10-CM

## 2024-02-15 DIAGNOSIS — M21.371 RIGHT FOOT DROP: ICD-10-CM

## 2024-02-15 DIAGNOSIS — F01.B2 MODERATE VASCULAR DEMENTIA WITH PSYCHOTIC DISTURBANCE (HCC): Primary | ICD-10-CM

## 2024-02-15 DIAGNOSIS — M19.011 PRIMARY OSTEOARTHRITIS OF BOTH SHOULDERS: ICD-10-CM

## 2024-02-15 PROCEDURE — 99349 HOME/RES VST EST MOD MDM 40: CPT | Performed by: NURSE PRACTITIONER

## 2024-02-15 NOTE — PROGRESS NOTES
[facility]  [address] [address]  [phone]  [employer]  Code 13        NAME: Adilson Lopez  AGE: 89 y.o. SEX: male CODE STATUS: CPR    DATE OF ENCOUNTER: 02/15/24    Assessment and Plan     1. 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 Anxiety with depression  Assessment & Plan:  In the setting of Alzheimer's dementia  Mood Stable   Continue BusPIRone (Buspar) 10mg BID  Lorazepam 0.5mg Daily  Lexapro 20 mg daily  Abilify 2 mg daily for bipolar d/o        3. Primary osteoarthritis of both shoulders  Assessment & Plan:  Denies pain on exam  Continue Tylenol PRN   Can use Lidocaine 4% cream PRN       4. Right foot drop  Assessment & Plan:  Noted in September 2022  Unknown etiology - no focal deficits   Does have a history of right thigh musculoskeletal pain beginning in July 2021 prior to experiencing right foot drop -patient had denied any falls or trauma however is a poor historian  Continue PT  Continue Brace while walking   Reviewed x-ray of right foot/ankle, right hip sacrum and right knee - all WNL except some degenerative changes to sacrum and stable right hip arthroplasty              All medications and routine orders were reviewed and updated as needed.    Chief Complaint     LTC follow up visit     History of Present Illness     Adilson Lopez is a 89-year-old male, he is a LTC resident of [employer]  [facility]. Past Medical Hx including but not limited to Dementia Anxiety/Depression, Ambulatory dysfuncion, Insomnia  seen in collaboration with nursing for medical mgmt and LTC follow up.     Seen and examined at [facility]. Patient is seen in dining room. Staff state he enjoys helping set up for meals. Patient is pleasant and cooperative. Patient offers no complaints on exam. Staff have no concerns. Staff state patient is eating and drinking well.  Patient continues to ambulate without assistive device. Per staff/patient - No bowel or bladder concerns.  Staff have no other concerns at this time.          The patient's allergies, past medical, surgical, social and family history were reviewed and unchanged.    Review of Systems     Review of Systems   Unable to perform ROS: Dementia         Objective     Vitals:   Vitals:    02/15/24 1505   BP: 134/66   Pulse: 67   Resp: 20   Temp: 97.7 °F (36.5 °C)   SpO2: 95%           Physical Exam  Vitals and nursing note reviewed.   Constitutional:       General: He is not in acute distress.     Appearance: Normal appearance.   HENT:      Head: Normocephalic and atraumatic.      Nose: No congestion or rhinorrhea.      Mouth/Throat:      Mouth: Mucous membranes are moist.   Eyes:      General: No scleral icterus.     Extraocular Movements: Extraocular movements intact.      Conjunctiva/sclera: Conjunctivae normal.      Pupils: Pupils are equal, round, and reactive to light.   Cardiovascular:      Rate and Rhythm: Normal rate and regular rhythm.      Pulses: Normal pulses.      Heart sounds: Normal heart sounds. No murmur heard.  Pulmonary:      Effort: Pulmonary effort is normal.      Breath sounds: Normal breath sounds. No wheezing, rhonchi or rales.   Abdominal:      General: Bowel sounds are normal. There is no distension.      Palpations: Abdomen is soft.      Tenderness: There is no abdominal tenderness.   Musculoskeletal:         General: No swelling or signs of injury.     "  Comments: Right foot drop  Bilateral hand weakness   Lymphadenopathy:      Cervical: No cervical adenopathy.   Skin:     General: Skin is warm and dry.      Findings: No erythema.   Neurological:      Mental Status: He is alert. Mental status is at baseline. He is disoriented.      Sensory: No sensory deficit.      Motor: Weakness present.      Gait: Gait abnormal.      Comments: AAOx person and place - clear speech. Forgetful.    Psychiatric:         Mood and Affect: Mood normal.         Behavior: Behavior normal.       Pertinent Laboratory/Diagnostic Studies:  Reviewed in facility chart-stable     Current Medications   Medications reviewed and updated see facility MAR for details.      Current Outpatient Medications:     Acetaminophen 325 MG CAPS, Take 975 mg by mouth every 8 (eight) hours as needed (pain), Disp: , Rfl:     ARIPiprazole (ABILIFY) 2 mg tablet, TAKE 1 TABLET BY MOUTH DAILY .  DO NOT START BEFORE JULY 13, 2023, Disp: 30 tablet, Rfl: 11    busPIRone (BUSPAR) 10 mg tablet, Take 1 tablet (10 mg total) by mouth 2 (two) times a day, Disp: 180 tablet, Rfl: 3    escitalopram (LEXAPRO) 10 mg tablet, Take 1 tablet (10 mg total) by mouth daily (Patient taking differently: Take 20 mg by mouth daily), Disp: 90 tablet, Rfl: 3    LORazepam (ATIVAN) 0.5 mg tablet, Take 1 tablet (0.5 mg total) by mouth in the morning Do not start before January 22, 2024., Disp: 30 tablet, Rfl: 0    mometasone (ELOCON) 0.1 % cream, Apply to affected areas below the neck once a day as needed., Disp: 45 g, Rfl: 0       Please note:  Voice-recognition software may have been used in the preparation of this document.  Occasional wrong word or \"sound-alike\" substitutions may have occurred due to the inherent limitations of voice recognition software.  Interpretation should be guided by context.       PREETI William  02/15/24    "

## 2024-02-22 VITALS
DIASTOLIC BLOOD PRESSURE: 66 MMHG | RESPIRATION RATE: 20 BRPM | SYSTOLIC BLOOD PRESSURE: 134 MMHG | OXYGEN SATURATION: 95 % | HEART RATE: 67 BPM | TEMPERATURE: 97.7 F

## 2024-02-22 RX ORDER — ESCITALOPRAM OXALATE 20 MG/1
20 TABLET ORAL DAILY
Start: 2024-02-22

## 2024-02-22 NOTE — ASSESSMENT & PLAN NOTE
Noted in September 2022  Unknown etiology - no focal deficits   Does have a history of right thigh musculoskeletal pain beginning in July 2021 prior to experiencing right foot drop -patient had denied any falls or trauma however is a poor historian  Continue PT  Continue Brace while walking   Reviewed x-ray of right foot/ankle, right hip sacrum and right knee - all WNL except some degenerative changes to sacrum and stable right hip arthroplasty

## 2024-02-22 NOTE — ASSESSMENT & PLAN NOTE
In the setting of Alzheimer's dementia  Mood Stable   Continue BusPIRone (Buspar) 10mg BID  Lorazepam 0.5mg Daily  Lexapro 20 mg daily  Abilify 2 mg daily for bipolar d/o

## 2024-02-22 NOTE — ASSESSMENT & PLAN NOTE
Recent [place] 7/12/2023 12 out of 30  Mood stable on current regimen  Continue Abilify 2 mg daily  Continue BuSpar 10 mg daily  Continue Lexapro 10 mg daily  Continue lorazepam 0.5 mg daily    Provide redirection, reorientation, distraction techniques  Fall Precautions  Assist with ADLs/IADLs  Avoid deliriogenic medications such as tramadol, benzodiazepines, anticholinergics, benadryl  Encourage Hydration/ Nutrition  Implement sleep hygiene, limit night time interuptions   Encourage participation in group activities when appropriate

## 2024-02-23 ENCOUNTER — TELEPHONE (OUTPATIENT)
Dept: OTHER | Facility: OTHER | Age: 89
End: 2024-02-23

## 2024-02-23 DIAGNOSIS — F41.9 ANXIETY: ICD-10-CM

## 2024-02-23 RX ORDER — LORAZEPAM 0.5 MG/1
0.5 TABLET ORAL DAILY
Qty: 30 TABLET | Refills: 5 | Status: SHIPPED | OUTPATIENT
Start: 2024-02-23 | End: 2024-02-26 | Stop reason: SDUPTHER

## 2024-02-26 DIAGNOSIS — F41.8 ANXIETY WITH DEPRESSION: ICD-10-CM

## 2024-02-26 DIAGNOSIS — F41.9 ANXIETY: ICD-10-CM

## 2024-02-26 RX ORDER — BUSPIRONE HYDROCHLORIDE 10 MG/1
10 TABLET ORAL 3 TIMES DAILY
Qty: 180 TABLET | Refills: 3 | Status: SHIPPED | OUTPATIENT
Start: 2024-02-26

## 2024-02-26 RX ORDER — LORAZEPAM 0.5 MG/1
0.5 TABLET ORAL DAILY
Qty: 90 TABLET | Refills: 0 | Status: SHIPPED | OUTPATIENT
Start: 2024-02-26

## 2024-03-19 DIAGNOSIS — F41.8 ANXIETY WITH DEPRESSION: ICD-10-CM

## 2024-03-19 RX ORDER — ESCITALOPRAM OXALATE 20 MG/1
20 TABLET ORAL DAILY
Qty: 90 TABLET | Refills: 3 | Status: SHIPPED | OUTPATIENT
Start: 2024-03-19

## 2024-03-25 DIAGNOSIS — F41.9 ANXIETY: ICD-10-CM

## 2024-03-25 RX ORDER — LORAZEPAM 0.5 MG/1
0.5 TABLET ORAL DAILY
Qty: 90 TABLET | Refills: 0 | Status: SHIPPED | OUTPATIENT
Start: 2024-03-25

## 2024-04-17 DIAGNOSIS — F41.8 ANXIETY WITH DEPRESSION: ICD-10-CM

## 2024-04-17 DIAGNOSIS — R46.89 BEHAVIORAL CHANGE: ICD-10-CM

## 2024-04-17 RX ORDER — BUSPIRONE HYDROCHLORIDE 10 MG/1
10 TABLET ORAL 3 TIMES DAILY
Qty: 180 TABLET | Refills: 3 | Status: CANCELLED | OUTPATIENT
Start: 2024-04-17

## 2024-04-17 RX ORDER — ARIPIPRAZOLE 2 MG/1
TABLET ORAL
Qty: 90 TABLET | Refills: 3 | Status: CANCELLED | OUTPATIENT
Start: 2024-04-17

## 2024-04-17 NOTE — TELEPHONE ENCOUNTER
Received fax from [facility] requesting clarification of QTY and an 90 day Rx for:    Apripiprazole 2mg qd  Busprione 10mg tid    Sending Rx for :  Apriprazole Qty 90, Refill 3  Busprione Qty 180, Refill 3  Provider to review/adjust/sign if appropriate. Sending to Johanna ÁLVAREZ because PREETI Mcleod is not available at this time.

## 2024-04-18 ENCOUNTER — NURSING HOME VISIT (OUTPATIENT)
Dept: GERIATRICS | Facility: OTHER | Age: 89
End: 2024-04-18

## 2024-04-18 DIAGNOSIS — R26.2 AMBULATORY DYSFUNCTION: ICD-10-CM

## 2024-04-18 DIAGNOSIS — M21.371 RIGHT FOOT DROP: ICD-10-CM

## 2024-04-18 DIAGNOSIS — F01.B2 MODERATE VASCULAR DEMENTIA WITH PSYCHOTIC DISTURBANCE (HCC): Primary | ICD-10-CM

## 2024-04-18 DIAGNOSIS — F41.8 ANXIETY WITH DEPRESSION: ICD-10-CM

## 2024-04-18 DIAGNOSIS — M19.049 ARTHRITIS OF FINGER: ICD-10-CM

## 2024-04-18 DIAGNOSIS — F41.9 ANXIETY: ICD-10-CM

## 2024-04-18 NOTE — PROGRESS NOTES
[facility]  [address] [address] [phone]  [place]  [address]      NAME: Adilson Lopez  AGE: 89 y.o. SEX: male [id number]    DATE OF ENCOUNTER: 4/18/2024    Assessment and Plan     1. 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  Continue lorazepam 0.5 mg po qAM  Continue aripiprazole 2 mg po qd with cross-overage for (2)  Continue escitalopram 20 mg po qd with cross-coverage for (2)  Continue buspirone 10 mg po tid with cross-coverage for (2)    2. Anxiety with depression  Mood stable.  Management as above.    3. Ambulatory dysfunction  No recent falls reported.    4. Right foot drop  Consider PT/OT, orthotic brace.    5. Arthritis of fingers  Resident reports intermittent swelling, without significant pain, of bilateral 2nd and 3rd digits primarily.  Consider adding voltaren 1% gel to affected digits bid prn      - Plan discussed with nursing staff    Chief Complaint     Routine Long term follow-up visit.    History of Present Illness     Mr. Lopez was interviewed today at the nursing station, accompanied by nurse Deepthi, for a confidential encounter. The resident states that he has occasional swelling at his bilateral 2nd and 3rd digit proximal interphalangeal joints, which he states makes it difficult to grasp items occasionally, but without pain. Aside from this, he endorses no other health or social issues. There have been no concerns voiced by nursing staff.    The following portions of the patient's history were reviewed and updated as appropriate: allergies, current medications, past family history, past medical history, past social history, past surgical history and problem list.    Review of Systems     Review of Systems   Constitutional:  Negative for appetite change.   HENT:  Negative for trouble swallowing.    Eyes:  Negative for visual disturbance.   Respiratory:  Negative for shortness of breath.     Cardiovascular:  Negative for chest pain.   Gastrointestinal:  Negative for abdominal pain, constipation, diarrhea, nausea and vomiting.   Genitourinary:  Negative for difficulty urinating.   Musculoskeletal:  Positive for arthralgias. Negative for myalgias.   Skin:  Negative for color change.   Neurological:  Negative for dizziness and headaches.   Psychiatric/Behavioral:  Negative for agitation and behavioral problems.      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   HENT:      Head: Normocephalic and atraumatic.      Mouth/Throat:      Mouth: Mucous membranes are moist.   Eyes:      Extraocular Movements: Extraocular movements intact.   Cardiovascular:      Rate and Rhythm: Normal rate and regular rhythm.   Pulmonary:      Effort: Pulmonary effort is normal.      Breath sounds: Normal breath sounds.   Abdominal:      Palpations: Abdomen is soft.   Musculoskeletal:      Right lower leg: No edema.      Left lower leg: No edema.   Skin:     General: Skin is warm.   Neurological:      Mental Status: He is alert. Mental status is at baseline.   Psychiatric:         Mood and Affect: Mood normal.         Behavior: Behavior normal.     Pertinent Laboratory/Diagnostic Studies:  Refer to facility chart.    Current Medications   Medications reviewed and updated in facility chart.

## 2024-04-19 DIAGNOSIS — F41.9 ANXIETY: ICD-10-CM

## 2024-04-19 RX ORDER — LORAZEPAM 0.5 MG/1
0.5 TABLET ORAL DAILY
Qty: 90 TABLET | Refills: 0 | Status: SHIPPED | OUTPATIENT
Start: 2024-04-19

## 2024-04-19 RX ORDER — LORAZEPAM 0.5 MG/1
0.5 TABLET ORAL DAILY
Qty: 30 TABLET | Refills: 0 | Status: CANCELLED | OUTPATIENT
Start: 2024-04-19 | End: 2024-05-19

## 2024-04-19 NOTE — ADDENDUM NOTE
Addended by: CRISTINA RODRIGUEZ on: 4/19/2024 12:05 PM     Modules accepted: Orders, Level of Service

## 2024-04-29 DIAGNOSIS — F41.9 ANXIETY: ICD-10-CM

## 2024-04-29 RX ORDER — LORAZEPAM 0.5 MG/1
0.5 TABLET ORAL DAILY
Qty: 30 TABLET | Refills: 0 | Status: SHIPPED | OUTPATIENT
Start: 2024-04-29

## 2024-04-29 NOTE — TELEPHONE ENCOUNTER
Hi Dr. Jacobs,    Just wanted to pass this along, as I know we spoke about this patients medication last week.     Thanks,  Charlotte

## 2024-05-10 DIAGNOSIS — F41.9 ANXIETY: ICD-10-CM

## 2024-05-10 RX ORDER — LORAZEPAM 0.5 MG/1
0.5 TABLET ORAL DAILY
Qty: 30 TABLET | Refills: 0 | Status: SHIPPED | OUTPATIENT
Start: 2024-05-10

## 2024-05-24 DIAGNOSIS — F41.9 ANXIETY: ICD-10-CM

## 2024-05-24 RX ORDER — LORAZEPAM 0.5 MG/1
0.5 TABLET ORAL DAILY
Qty: 30 TABLET | Refills: 0 | Status: SHIPPED | OUTPATIENT
Start: 2024-05-24

## 2024-06-13 ENCOUNTER — NURSING HOME VISIT (OUTPATIENT)
Dept: GERIATRICS | Facility: OTHER | Age: 89
End: 2024-06-13
Payer: MEDICARE

## 2024-06-13 DIAGNOSIS — M21.371 RIGHT FOOT DROP: ICD-10-CM

## 2024-06-13 DIAGNOSIS — R26.2 AMBULATORY DYSFUNCTION: ICD-10-CM

## 2024-06-13 DIAGNOSIS — F01.B2 MODERATE VASCULAR DEMENTIA WITH PSYCHOTIC DISTURBANCE (HCC): Primary | ICD-10-CM

## 2024-06-13 DIAGNOSIS — F41.8 ANXIETY WITH DEPRESSION: ICD-10-CM

## 2024-06-13 DIAGNOSIS — M19.049 ARTHRITIS OF FINGER: ICD-10-CM

## 2024-06-13 PROCEDURE — 99349 HOME/RES VST EST MOD MDM 40: CPT | Performed by: FAMILY MEDICINE

## 2024-06-13 NOTE — PROGRESS NOTES
[facility]  [address] [address] [phone]  [place] [address]  [address]      NAME: Adilson Lopez  AGE: 89 y.o. SEX: male [id number]    DATE OF ENCOUNTER: 6/13/2024    Assessment and Plan     1. 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  Continue lorazepam 0.5 mg po qAM  Continue aripiprazole 2 mg po qd with cross-overage for (2)  Continue escitalopram 20 mg po qd with cross-coverage for (2)  Continue buspirone 10 mg po tid with cross-coverage for (2)     2. Anxiety with depression  Mood stable.  Management as above.     3. Ambulatory dysfunction  No recent falls reported.     4. Right foot drop  Consider PT/OT, orthotic brace.     5. Arthritis of fingers  Resident reports intermittent swelling, without significant pain, of bilateral 2nd and 3rd digits primarily.  Continue aspercreme.      - POC discussed with nursing staff    Chief Complaint     Routine Long term follow-up visit.    History of Present Illness     Seen today in dormitory. Reports occasional hip discomfort, but aside from this does not endorse any other issues. Remains social in the facility with preservation of diet and sleep-wake cycle. No acute issues reported by nursing/ancillary staff.    The following portions of the patient's history were reviewed and updated as appropriate: allergies, current medications, past family history, past medical history, past social history, past surgical history and problem list.    Review of Systems     Review of Systems   Constitutional:  Negative for appetite change and unexpected weight change.   HENT:  Negative for trouble swallowing.    Eyes:  Negative for visual disturbance.   Respiratory:  Negative for shortness of breath.    Cardiovascular:  Negative for chest pain and leg swelling.   Gastrointestinal:  Negative for constipation and diarrhea.   Genitourinary:  Negative for difficulty urinating.   Musculoskeletal:  Positive  for arthralgias.   Neurological:  Negative for headaches.   Psychiatric/Behavioral:  Negative for agitation and behavioral problems.      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    Physical Exam  Constitutional:       Appearance: Normal appearance.   HENT:      Head: Normocephalic and atraumatic.   Eyes:      Extraocular Movements: Extraocular movements intact.   Cardiovascular:      Rate and Rhythm: Normal rate and regular rhythm.   Pulmonary:      Breath sounds: Normal breath sounds.   Abdominal:      Palpations: Abdomen is soft.   Musculoskeletal:      Right lower leg: No edema.      Left lower leg: No edema.   Skin:     General: Skin is warm and dry.   Neurological:      Mental Status: He is alert. Mental status is at baseline.   Psychiatric:         Mood and Affect: Mood normal.         Behavior: Behavior normal.     Pertinent Laboratory/Diagnostic Studies:  Refer to facility chart.    Current Medications   Medications reviewed and updated in facility chart.

## 2024-06-20 DIAGNOSIS — F41.9 ANXIETY: ICD-10-CM

## 2024-06-20 RX ORDER — LORAZEPAM 0.5 MG/1
0.5 TABLET ORAL DAILY
Qty: 30 TABLET | Refills: 0 | Status: SHIPPED | OUTPATIENT
Start: 2024-06-20

## 2024-07-19 DIAGNOSIS — T63.461D: ICD-10-CM

## 2024-07-19 DIAGNOSIS — F41.9 ANXIETY: ICD-10-CM

## 2024-07-19 DIAGNOSIS — T63.451D TOXIC EFFECT OF VENOM OF HORNETS, UNINTENTIONAL, SUBSEQUENT ENCOUNTER: ICD-10-CM

## 2024-07-19 DIAGNOSIS — T63.461D TOXIC EFFECT OF VENOM OF WASPS, UNINTENTIONAL, SUBSEQUENT ENCOUNTER: ICD-10-CM

## 2024-07-19 DIAGNOSIS — T63.481D: ICD-10-CM

## 2024-07-19 DIAGNOSIS — T63.461D WASP STING, ACCIDENTAL OR UNINTENTIONAL, SUBSEQUENT ENCOUNTER: ICD-10-CM

## 2024-07-19 RX ORDER — LORAZEPAM 0.5 MG/1
0.5 TABLET ORAL DAILY
Qty: 30 TABLET | Refills: 0 | Status: SHIPPED | OUTPATIENT
Start: 2024-07-19

## 2024-07-22 DIAGNOSIS — R46.89 BEHAVIORAL CHANGE: ICD-10-CM

## 2024-07-30 RX ORDER — ARIPIPRAZOLE 2 MG/1
2 TABLET ORAL DAILY
Qty: 30 TABLET | Refills: 11 | Status: SHIPPED | OUTPATIENT
Start: 2024-07-30

## 2024-08-08 DIAGNOSIS — F41.8 ANXIETY WITH DEPRESSION: ICD-10-CM

## 2024-08-15 ENCOUNTER — NURSING HOME VISIT (OUTPATIENT)
Dept: GERIATRICS | Facility: OTHER | Age: 89
End: 2024-08-15
Payer: MEDICARE

## 2024-08-15 VITALS
OXYGEN SATURATION: 93 % | SYSTOLIC BLOOD PRESSURE: 122 MMHG | WEIGHT: 135.8 LBS | RESPIRATION RATE: 16 BRPM | BODY MASS INDEX: 25.66 KG/M2 | HEART RATE: 67 BPM | DIASTOLIC BLOOD PRESSURE: 54 MMHG | TEMPERATURE: 97.1 F

## 2024-08-15 DIAGNOSIS — F01.B2 MODERATE VASCULAR DEMENTIA WITH PSYCHOTIC DISTURBANCE (HCC): Primary | ICD-10-CM

## 2024-08-15 DIAGNOSIS — F41.9 ANXIETY: ICD-10-CM

## 2024-08-15 DIAGNOSIS — R54 FRAILTY: ICD-10-CM

## 2024-08-15 DIAGNOSIS — F41.8 ANXIETY WITH DEPRESSION: ICD-10-CM

## 2024-08-15 DIAGNOSIS — K21.9 GASTROESOPHAGEAL REFLUX DISEASE WITHOUT ESOPHAGITIS: ICD-10-CM

## 2024-08-15 PROCEDURE — 99349 HOME/RES VST EST MOD MDM 40: CPT | Performed by: NURSE PRACTITIONER

## 2024-08-15 NOTE — PROGRESS NOTES
[facility]  [address] [address]  [phone]  [place]  Code 13        NAME: Adilson Lopez  AGE: 89 y.o. SEX: male CODE STATUS: CPR    DATE OF ENCOUNTER: 08/15/24    Assessment and Plan     1. 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 Anxiety  -     LORazepam (ATIVAN) 0.5 mg tablet; Take 1 tablet (0.5 mg total) by mouth in the morning  3. Frailty  Assessment & Plan:  In the setting of advanced age and Alzheimer's dementia  Continue restorative PT  Encourage nutrition/hydration  Fall precautions  4. Anxiety with depression  Assessment & Plan:  In the setting of Alzheimer's dementia  Mood Stable   Continue BusPIRone (Buspar) 10mg BID  Lorazepam 0.5mg Daily  Lexapro 20 mg daily  Abilify 2 mg daily for bipolar d/o    5. Gastroesophageal reflux disease without esophagitis  Assessment & Plan:  Denies symptoms on exam  Should patient develop symptoms can trial famotidine  Offered Tums as needed           All medications and routine orders were reviewed and updated as needed.    Chief Complaint     LTC follow up visit     History of Present Illness     Adilson Lopez is a 89-year-old male, he is a LTC resident of [facility]. Past Medical Hx including but not limited to Dementia Anxiety/Depression, Ambulatory dysfuncion, Insomnia  seen in collaboration with nursing for medical mgmt and LTC follow up.      Seen and examined at [facility]. Patient is seen in dining room. Staff state he enjoys helping set up for meals. Patient is pleasant and cooperative. Patient offers no complaints on exam. Staff have no concerns. Staff state patient is eating and drinking well.  Patient continues to ambulate without assistive device. Per staff/patient - No bowel or bladder concerns.  Staff have no other concerns at this time.        The patient's allergies, past medical, surgical, social and family history were reviewed and unchanged.    Review of Systems     Review of Systems   Unable to perform ROS: Dementia         Objective     Vitals:   Vitals:    08/15/24 1424   BP: 122/54   Pulse: 67   Resp: 16   Temp: (!) 97.1 °F (36.2 °C)   SpO2: 93%           Physical Exam  Vitals and nursing note reviewed.   Constitutional:       General: He is not in acute distress.     Appearance: Normal appearance.   HENT:      Head: Normocephalic and atraumatic.      Nose: No congestion or rhinorrhea.      Mouth/Throat:      Mouth: Mucous membranes are moist.   Eyes:      General: No scleral icterus.     Extraocular Movements: Extraocular movements intact.      Conjunctiva/sclera: Conjunctivae normal.      Pupils: Pupils are equal, round, and reactive to light.   Cardiovascular:      Rate and Rhythm: Normal rate and regular rhythm.      Pulses: Normal pulses.      Heart sounds: Normal heart sounds. No murmur heard.  Pulmonary:      Effort: Pulmonary effort is normal.      Breath sounds: Normal breath sounds. No wheezing, rhonchi or rales.   Abdominal:      General: Bowel sounds are normal. There is no distension.      Palpations: Abdomen is soft.      Tenderness: There is no abdominal tenderness.   Musculoskeletal:         General: No swelling or signs of injury.      Comments: Right foot drop  Bilateral hand weakness   Lymphadenopathy:      Cervical: No cervical adenopathy.   Skin:     General: Skin is warm and dry.      Findings: No erythema.  "  Neurological:      Mental Status: He is alert. Mental status is at baseline. He is disoriented.      Sensory: No sensory deficit.      Motor: Weakness present.      Gait: Gait abnormal.      Comments: AAOx person and place - clear speech. Forgetful.    Psychiatric:         Mood and Affect: Mood normal.         Behavior: Behavior normal.       Pertinent Laboratory/Diagnostic Studies:  Reviewed in facility chart-stable     Current Medications   Medications reviewed and updated see facility MAR for details.      Current Outpatient Medications:     Acetaminophen 325 MG CAPS, Take 975 mg by mouth every 8 (eight) hours as needed (pain), Disp: , Rfl:     ARIPiprazole (ABILIFY) 2 mg tablet, TAKE 1 TABLET BY MOUTH DAILY, Disp: 30 tablet, Rfl: 11    busPIRone (BUSPAR) 10 mg tablet, Take 1 tablet (10 mg total) by mouth 3 (three) times a day, Disp: 180 tablet, Rfl: 3    escitalopram (LEXAPRO) 20 mg tablet, Take 1 tablet (20 mg total) by mouth daily, Disp: 90 tablet, Rfl: 3    LORazepam (ATIVAN) 0.5 mg tablet, Take 1 tablet (0.5 mg total) by mouth in the morning, Disp: 30 tablet, Rfl: 0    mometasone (ELOCON) 0.1 % cream, Apply to affected areas below the neck once a day as needed., Disp: 45 g, Rfl: 0       Please note:  Voice-recognition software may have been used in the preparation of this document.  Occasional wrong word or \"sound-alike\" substitutions may have occurred due to the inherent limitations of voice recognition software.  Interpretation should be guided by context.       PREETI William  08/15/24    "

## 2024-08-23 RX ORDER — LORAZEPAM 0.5 MG/1
0.5 TABLET ORAL DAILY
Qty: 30 TABLET | Refills: 0 | Status: SHIPPED | OUTPATIENT
Start: 2024-08-23

## 2024-08-23 NOTE — ASSESSMENT & PLAN NOTE
In the setting of advanced age and Alzheimer's dementia  Continue restorative PT  Encourage nutrition/hydration  Fall precautions

## 2024-08-23 NOTE — ASSESSMENT & PLAN NOTE
Denies symptoms on exam  Should patient develop symptoms can trial famotidine  Offered Tums as needed

## 2024-08-23 NOTE — ASSESSMENT & PLAN NOTE
Recent [place] 7/12/2023 12 out of 30  Mood stable on current regimen  Continue Abilify 2 mg daily  Continue BuSpar 10 mg daily  Continue Lexapro 10 mg daily  Continue lorazepam 0.5 mg daily    Provide redirection, reorientation, distraction techniques  Fall Precautions  Assist with ADLs/IADLs  Avoid deliriogenic medications such as tramadol, benzodiazepines, anticholinergics, benadryl  Encourage Hydration/ Nutrition  Implement sleep hygiene, limit night time interuptions   Encourage participation in group activities when appropriate    prescriptions sent

## 2024-09-19 DIAGNOSIS — F41.9 ANXIETY: ICD-10-CM

## 2024-09-20 RX ORDER — LORAZEPAM 0.5 MG/1
0.5 TABLET ORAL DAILY
Qty: 30 TABLET | Refills: 0 | Status: SHIPPED | OUTPATIENT
Start: 2024-09-20

## 2024-10-07 RX ORDER — BUSPIRONE HYDROCHLORIDE 10 MG/1
10 TABLET ORAL 3 TIMES DAILY
Qty: 120 TABLET | OUTPATIENT
Start: 2024-10-07

## 2024-10-11 ENCOUNTER — NURSING HOME VISIT (OUTPATIENT)
Dept: GERIATRICS | Facility: OTHER | Age: 89
End: 2024-10-11
Payer: MEDICARE

## 2024-10-11 DIAGNOSIS — M19.012 PRIMARY OSTEOARTHRITIS OF BOTH SHOULDERS: ICD-10-CM

## 2024-10-11 DIAGNOSIS — F41.8 ANXIETY WITH DEPRESSION: ICD-10-CM

## 2024-10-11 DIAGNOSIS — M19.011 PRIMARY OSTEOARTHRITIS OF BOTH SHOULDERS: ICD-10-CM

## 2024-10-11 DIAGNOSIS — M25.531 BILATERAL WRIST PAIN: ICD-10-CM

## 2024-10-11 DIAGNOSIS — F01.B2 MODERATE VASCULAR DEMENTIA WITH PSYCHOTIC DISTURBANCE (HCC): Primary | ICD-10-CM

## 2024-10-11 DIAGNOSIS — M25.532 BILATERAL WRIST PAIN: ICD-10-CM

## 2024-10-11 PROCEDURE — 99349 HOME/RES VST EST MOD MDM 40: CPT | Performed by: FAMILY MEDICINE

## 2024-10-11 NOTE — PROGRESS NOTES
[facility]  [address] [address] [phone]  [facility]  POS 13      NAME: Adilson Lopez  AGE: 89 y.o. SEX: male [phone]    DATE OF ENCOUNTER: 10/11/2024    Assessment and Plan     1. 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 Anxiety with depression  - stable  - cont Buspirone 10 mg po tid  - cont citalopram 20 mg po qd    3. Primary osteoarthritis of both shoulders  - stable  - cont Tylenol 975 mg po q8  - cont Lidocaine 4% cream to knees    4. Bilateral wrist pain  - may use wrist splints  - cont Tylenol 975 mg po q8  - cont Lidocaine 4% cream to wrists      - Counseling Documentation: patient was counseled regarding: prognosis    Chief Complaint     Routine Long term follow-up visit.    History of Present Illness     HPI  Adilson Lpoez, a 89 y/o male is a long term resident at [facility], seen and examined, stable. He found sitting in the activities. He is able to answer simple questions. He is c/o b/l hand and wrist pain. He walks independently.   Staff have no concerns at this time. He is eating and sleeping well, denies recent falls or hospitalizations. He helps with setting up the tables.     The following portions of the patient's history were reviewed and updated as appropriate: allergies, current medications, past family history, past medical history, past social history, past surgical history and problem list.    Review of Systems     Review of Systems   Constitutional:  Negative for activity change and fatigue.   HENT:  Positive for hearing loss.    Respiratory: Negative.     Cardiovascular: Negative.    Gastrointestinal: Negative.    Genitourinary: Negative.    Musculoskeletal:  Positive for arthralgias and gait problem.   Neurological:  Positive for weakness.   Psychiatric/Behavioral: Negative.     As in HPI.    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   Constitutional:       General: He is not in acute distress.     Appearance: Normal appearance. He is well-developed. He is not diaphoretic.   HENT:      Head: Normocephalic and atraumatic.      Nose: Nose normal.      Mouth/Throat:      Mouth: Mucous membranes are moist.      Pharynx: Oropharynx is clear. No oropharyngeal exudate.   Eyes:      General: No scleral icterus.        Right eye: No discharge.         Left eye: No discharge.      Extraocular Movements: Extraocular movements intact.      Conjunctiva/sclera: Conjunctivae normal.   Cardiovascular:      Rate and Rhythm: Normal rate and regular rhythm.      Heart sounds: Normal heart sounds. No murmur heard.  Pulmonary:      Effort: Pulmonary effort is normal. No respiratory distress.      Breath sounds: Normal breath sounds. No wheezing.   Chest:      Chest wall: No tenderness.   Abdominal:      General: Bowel sounds are normal. There is no distension.      Palpations: Abdomen is soft.      Tenderness: There is no abdominal tenderness. There is no guarding.   Musculoskeletal:         General: No tenderness or deformity.      Right lower leg: No edema.      Left lower leg: No edema.      Comments: Decreased ROM in b/l wrists   Skin:     General: Skin is warm and dry.   Neurological:      Mental Status: He is alert.      Cranial Nerves:  No cranial nerve deficit.      Motor: No abnormal muscle tone.      Coordination: Coordination normal.      Comments: Oriented to self  Verbal, clear speech  Able to follow commands   Psychiatric:         Mood and Affect: Mood normal.         Behavior: Behavior normal.      Comments: Intermittent confusion         Pertinent Laboratory/Diagnostic Studies:  Refer to facility chart.    Current Medications   Medications reviewed and updated in facility chart.

## 2024-10-25 DIAGNOSIS — F41.9 ANXIETY: ICD-10-CM

## 2024-10-25 RX ORDER — LORAZEPAM 0.5 MG/1
0.5 TABLET ORAL DAILY
Qty: 30 TABLET | Refills: 0 | Status: SHIPPED | OUTPATIENT
Start: 2024-10-25

## 2024-11-22 DIAGNOSIS — F41.9 ANXIETY: ICD-10-CM

## 2024-11-22 RX ORDER — LORAZEPAM 0.5 MG/1
0.5 TABLET ORAL DAILY
Qty: 30 TABLET | Refills: 0 | Status: SHIPPED | OUTPATIENT
Start: 2024-11-22

## 2024-11-29 ENCOUNTER — NURSING HOME VISIT (OUTPATIENT)
Dept: GERIATRICS | Facility: OTHER | Age: 89
End: 2024-11-29
Payer: MEDICARE

## 2024-11-29 DIAGNOSIS — H61.23 CERUMEN DEBRIS ON TYMPANIC MEMBRANE OF BOTH EARS: Primary | ICD-10-CM

## 2024-11-29 PROCEDURE — 69210 REMOVE IMPACTED EAR WAX UNI: CPT | Performed by: NURSE PRACTITIONER

## 2024-11-29 NOTE — ASSESSMENT & PLAN NOTE
B/L ears washed out today  He did received 5 days of debrox prior  Able to visualize TM bilaterally after cleaning   Tolerated procedure well

## 2024-11-29 NOTE — PROGRESS NOTES
[facility]  [address] [place] [id number]  [phone]  FACILITY: [facility]   Code 13  ACUTE VISIT    Assessment/Plan:    1. Cerumen debris on tympanic membrane of both ears  Assessment & Plan:  B/L ears washed out today  He did received 5 days of debrox prior  Able to visualize TM bilaterally after cleaning   Tolerated procedure well   Orders:  -     Ear cerumen removal         Subjective:      Patient ID: Adilson Lopez is a 89 y.o. male. CODE STATUS: No CPR   Patient is a LT resident of [facility]  Seen and evaluated at bedside today for Acute visit per request of nursing for c/o ear cleaning.     PAST MEDICAL HISTORY:  History reviewed. No pertinent past medical history.  Past Surgical History:   Procedure Laterality Date    HIP SURGERY           Adilson Lopez is a 89-year-old male, he is a LT resident of [facility]. Past Medical Hx including but not limited to Dementia Anxiety/Depression, Ambulatory dysfuncion, Insomnia  seen in collaboration with nursing for ear cleaning.     Seen and examined at [facility]. Patient is seen in health center. B/L TM were obstructed by wax upon exam. Flushed with Otoscope/waterpik, revisulized by TM were pearly gray. No bulging or redness noted.  Patient is pleasant and cooperative. Patient tolerated procedure well.            Review of Systems   HENT:  Positive for hearing loss.          Objective:    Ear cerumen removal    Date/Time: 11/29/2024 3:43 PM    Performed by: PREETI William  Authorized by: PREETI William  Universal Protocol:  procedure performed by consultantConsent: Verbal consent obtained.  Consent given by: patient  Patient understanding: patient states understanding of the procedure being performed  Patient identity confirmed: verbally with patient    Patient location:  Clinic  Procedure details:     Local anesthetic:  None    Location:  Both ears    Procedure type: irrigation with instrumentation       "Instrumentation: curette      Approach:  Natural orifice  Post-procedure details:     Complication:  None    Hearing quality:  Improved    Patient tolerance of procedure:  Tolerated well, no immediate complications           Physical Exam  HENT:      Right Ear: Tympanic membrane, ear canal and external ear normal.      Left Ear: Tympanic membrane, ear canal and external ear normal.             Current Outpatient Medications:     Acetaminophen 325 MG CAPS, Take 975 mg by mouth every 8 (eight) hours as needed (pain), Disp: , Rfl:     ARIPiprazole (ABILIFY) 2 mg tablet, TAKE 1 TABLET BY MOUTH DAILY, Disp: 30 tablet, Rfl: 11    busPIRone (BUSPAR) 10 mg tablet, Take 1 tablet (10 mg total) by mouth 3 (three) times a day, Disp: 180 tablet, Rfl: 3    escitalopram (LEXAPRO) 20 mg tablet, Take 1 tablet (20 mg total) by mouth daily (Patient not taking: Reported on 9/23/2024), Disp: 90 tablet, Rfl: 3    LORazepam (ATIVAN) 0.5 mg tablet, Take 1 tablet (0.5 mg total) by mouth in the morning, Disp: 30 tablet, Rfl: 0    mometasone (ELOCON) 0.1 % cream, Apply to affected areas below the neck once a day as needed. (Patient not taking: Reported on 9/23/2024), Disp: 45 g, Rfl: 0      Please note:  Voice-recognition software may have been used in the preparation of this document.  Occasional wrong word or \"sound-alike\" substitutions may have occurred due to the inherent limitations of voice recognition software.  Interpretation should be guided by context.       PREETI William  11/29/24  3:44 PM      "

## 2024-12-06 ENCOUNTER — NURSING HOME VISIT (OUTPATIENT)
Dept: GERIATRICS | Facility: OTHER | Age: 89
End: 2024-12-06
Payer: MEDICARE

## 2024-12-06 DIAGNOSIS — F01.B2 MODERATE VASCULAR DEMENTIA WITH PSYCHOTIC DISTURBANCE (HCC): ICD-10-CM

## 2024-12-06 DIAGNOSIS — F41.8 ANXIETY WITH DEPRESSION: Primary | ICD-10-CM

## 2024-12-06 DIAGNOSIS — M21.371 RIGHT FOOT DROP: ICD-10-CM

## 2024-12-06 DIAGNOSIS — M25.531 BILATERAL WRIST PAIN: ICD-10-CM

## 2024-12-06 DIAGNOSIS — M25.532 BILATERAL WRIST PAIN: ICD-10-CM

## 2024-12-06 PROCEDURE — 99349 HOME/RES VST EST MOD MDM 40: CPT | Performed by: FAMILY MEDICINE

## 2024-12-06 NOTE — PROGRESS NOTES
[facility]  [address] [address] [phone]  [facility]  POS 13      NAME: Adilson Lopez  AGE: 89 y.o. SEX: male [phone]    DATE OF ENCOUNTER: 12/27/2024    Assessment and Plan     1. Anxiety with depression (Primary)  - stable  - decrease Buspirone to 10 mg bid  - cont Aripiprazole 2 mg po qd  - cont Citalopram 20 mg po qd  - cont Lorazepam 0.5 mg qd (wean off)    2. Bilateral wrist pain  - wears braces  - cont Tylenol 975 mg po q6 as needed  - cont Aspercreme 4% as needed     3. 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 Right foot drop  - improving  - able to walk  - no recent falls  - Fall precautions in place      - Counseling Documentation: patient was counseled regarding: instructions for management, risk factor reductions, and prognosis    Chief Complaint     Routine Long term follow-up visit.    History of Present Illness     HPI  Adilson Lopez, a 89 y/o male is a long term resident at [facility], seen and examined, stable. He found sitting at the coffee table, drinking coffee and participating in morning activities. He is c/o pain (chronic) in his hands and wrists. He is able to use his hands. He walks independently, no recent falls.  Staff have no concerns at this time, except for progressive decline, he manages his ADLs with little help/supervision.     The following portions of the patient's history were reviewed and updated as appropriate: allergies, current medications, past family history, past medical history, past social history, past surgical history and problem list.    Review of Systems     Review of Systems   Constitutional:  Positive for activity change and fatigue.   HENT:  Positive for hearing loss. Negative for dental problem and trouble swallowing.    Eyes: Negative.    Respiratory: Negative.     Cardiovascular: Negative.     Gastrointestinal: Negative.    Genitourinary: Negative.    Musculoskeletal:  Positive for arthralgias, gait problem and myalgias.   Neurological:  Positive for weakness.   Psychiatric/Behavioral: Negative.     As in HPI.    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   Constitutional:       General: He is not in acute distress.     Appearance: Normal appearance. He is well-developed. He is not diaphoretic.   HENT:      Head: Normocephalic and atraumatic.      Nose: Nose normal.      Mouth/Throat:      Mouth: Mucous membranes are moist.      Pharynx: Oropharynx is clear. No oropharyngeal exudate.   Eyes:      General: No scleral icterus.        Right eye: No discharge.         Left eye: No discharge.      Extraocular Movements: Extraocular movements intact.      Conjunctiva/sclera: Conjunctivae normal.      Pupils: Pupils are equal, round, and reactive to light.   Cardiovascular:      Rate and Rhythm: Normal rate and regular rhythm.      Heart sounds: Normal heart sounds. No murmur heard.  Pulmonary:      Effort: Pulmonary effort is normal. No respiratory distress.      Breath sounds: Normal breath sounds. No wheezing.   Chest:      Chest wall: No tenderness.   Abdominal:      General: Bowel sounds are normal. There is no distension.      Palpations: Abdomen is  soft.      Tenderness: There is no abdominal tenderness. There is no guarding.   Musculoskeletal:         General: Tenderness present. No deformity. Normal range of motion.      Cervical back: Normal range of motion and neck supple.      Right lower leg: No edema.      Left lower leg: No edema.   Skin:     General: Skin is warm and dry.   Neurological:      Mental Status: He is alert.      Cranial Nerves: No cranial nerve deficit.      Motor: No abnormal muscle tone.      Coordination: Coordination normal.      Comments: Oriented to person and place  Verbal, clear speech  Able to follow commands   Psychiatric:         Mood and Affect: Mood normal.         Behavior: Behavior normal.         Pertinent Laboratory/Diagnostic Studies:  Refer to facility chart.    Current Medications   Medications reviewed and updated in facility chart.

## 2024-12-18 DIAGNOSIS — F41.9 ANXIETY: ICD-10-CM

## 2024-12-18 RX ORDER — LORAZEPAM 0.5 MG/1
0.5 TABLET ORAL DAILY
Qty: 30 TABLET | Refills: 0 | Status: SHIPPED | OUTPATIENT
Start: 2024-12-18

## 2024-12-27 RX ORDER — BUSPIRONE HYDROCHLORIDE 10 MG/1
10 TABLET ORAL 2 TIMES DAILY
COMMUNITY

## 2024-12-29 PROBLEM — H61.23 CERUMEN DEBRIS ON TYMPANIC MEMBRANE OF BOTH EARS: Status: RESOLVED | Noted: 2024-11-29 | Resolved: 2024-12-29

## 2025-01-17 DIAGNOSIS — F41.9 ANXIETY: ICD-10-CM

## 2025-01-17 RX ORDER — LORAZEPAM 0.5 MG/1
0.5 TABLET ORAL DAILY
Qty: 60 TABLET | Refills: 0 | Status: SHIPPED | OUTPATIENT
Start: 2025-01-17

## 2025-01-30 ENCOUNTER — NURSING HOME VISIT (OUTPATIENT)
Dept: GERIATRICS | Facility: OTHER | Age: OVER 89
End: 2025-01-30
Payer: MEDICARE

## 2025-01-30 DIAGNOSIS — M25.532 BILATERAL WRIST PAIN: ICD-10-CM

## 2025-01-30 DIAGNOSIS — R54 FRAILTY: ICD-10-CM

## 2025-01-30 DIAGNOSIS — M25.531 BILATERAL WRIST PAIN: ICD-10-CM

## 2025-01-30 DIAGNOSIS — M21.371 RIGHT FOOT DROP: ICD-10-CM

## 2025-01-30 DIAGNOSIS — F01.B2 MODERATE VASCULAR DEMENTIA WITH PSYCHOTIC DISTURBANCE (HCC): Primary | ICD-10-CM

## 2025-01-30 PROCEDURE — 99349 HOME/RES VST EST MOD MDM 40: CPT | Performed by: NURSE PRACTITIONER

## 2025-01-31 VITALS
OXYGEN SATURATION: 98 % | SYSTOLIC BLOOD PRESSURE: 100 MMHG | WEIGHT: 134.2 LBS | BODY MASS INDEX: 25.36 KG/M2 | HEART RATE: 66 BPM | DIASTOLIC BLOOD PRESSURE: 44 MMHG | TEMPERATURE: 97.7 F | RESPIRATION RATE: 16 BRPM

## 2025-01-31 NOTE — ASSESSMENT & PLAN NOTE
"Noted in September 2022  Unknown etiology - no focal deficits   Continue Brace while walking   Reviewed x-ray of right foot/ankle, right hip sacrum and right knee - all WNL except some degenerative changes to sacrum and stable right hip arthroplasty   Patient often walking around unit with limp--> patient states, \"my right leg is shorter than my left\"   No recent falls   "

## 2025-01-31 NOTE — ASSESSMENT & PLAN NOTE
Patient continues to c/o b/l wrist aching pain associated with numbness in his hands   Continue wrist splints   Continue pain management with tylenol, Aspercreme

## 2025-01-31 NOTE — PROGRESS NOTES
"[facility]  [address] [address]  [phone]  [place]  Code 13        NAME: Adilson Lopez  AGE: 89 y.o. SEX: male CODE STATUS: CPR    DATE OF ENCOUNTER: 1/30/2025    Assessment and Plan     1. 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 Bilateral wrist pain  Assessment & Plan:  Patient continues to c/o b/l wrist aching pain associated with numbness in his hands   Continue wrist splints   Continue pain management with tylenol, Aspercreme   3. Right foot drop  Assessment & Plan:  Noted in September 2022  Unknown etiology - no focal deficits   Continue Brace while walking   Reviewed x-ray of right foot/ankle, right hip sacrum and right knee - all WNL except some degenerative changes to sacrum and stable right hip arthroplasty   Patient often walking around unit with limp--> patient states, \"my right leg is shorter than my left\"   No recent falls   4. Frailty  Assessment & Plan:  In the setting of advanced age and Alzheimer's dementia  Continue restorative PT  Encourage nutrition/hydration  Fall precautions           All medications and routine orders were reviewed and updated as needed.    Chief Complaint     LTC follow up visit     History of Present Illness     Adilson Lopez is a 89-year-old male, he is a LTC resident of [facility]. Past Medical Hx including but not limited to Dementia " Anxiety/Depression, Ambulatory dysfuncion, Insomnia  seen in collaboration with nursing for medical mgmt and LTC follow up.     Seen and examined at [facility]. Patient is seen in health center of [facility]. Staff state he enjoys helping set up for meals. Patient is pleasant and cooperative. Patient states he limps because his one leg is shorter than the other and he states his b/l wrists still hurt at times, not sure if splints help. Staff states he sometimes does not like to wear splints, otherwise he offers no other complaints on exam. Staff have no concerns. Staff state patient is eating and drinking well.  Patient continues to ambulate without assistive device. Per staff/patient - No bowel or bladder concerns.          The patient's allergies, past medical, surgical, social and family history were reviewed and unchanged.    Review of Systems     Review of Systems   Unable to perform ROS: Dementia         Objective     Vitals:   Vitals:    01/30/25 1619   BP: (!) 100/44   Pulse: 66   Resp: 16   Temp: 97.7 °F (36.5 °C)   SpO2: 98%           Physical Exam  Vitals and nursing note reviewed.   Constitutional:       General: He is not in acute distress.     Appearance: Normal appearance.   HENT:      Head: Normocephalic and atraumatic.      Nose: No congestion or rhinorrhea.      Mouth/Throat:      Mouth: Mucous membranes are moist.   Eyes:      General: No scleral icterus.     Extraocular Movements: Extraocular movements intact.      Conjunctiva/sclera: Conjunctivae normal.      Pupils: Pupils are equal, round, and reactive to light.   Cardiovascular:      Rate and Rhythm: Normal rate and regular rhythm.      Pulses: Normal pulses.      Heart sounds: Normal heart sounds. No murmur heard.  Pulmonary:      Effort: Pulmonary effort is normal.      Breath sounds: Normal breath sounds. No wheezing, rhonchi or rales.   Abdominal:      General: Bowel sounds are normal. There is no distension.      Palpations: Abdomen is soft.       "Tenderness: There is no abdominal tenderness.   Musculoskeletal:         General: No swelling or signs of injury.      Comments: Right foot drop  Bilateral hand weakness   Lymphadenopathy:      Cervical: No cervical adenopathy.   Skin:     General: Skin is warm and dry.      Findings: No erythema.   Neurological:      Mental Status: He is alert. Mental status is at baseline. He is disoriented.      Sensory: No sensory deficit.      Motor: Weakness present.      Gait: Gait abnormal.      Comments: AAOx person and place - clear speech. Forgetful.    Psychiatric:         Mood and Affect: Mood normal.         Behavior: Behavior normal.         Pertinent Laboratory/Diagnostic Studies:  Reviewed in facility chart-stable     Current Medications   Medications reviewed and updated see facility MAR for details.      Current Outpatient Medications:     Acetaminophen 325 MG CAPS, Take 975 mg by mouth every 8 (eight) hours as needed (pain), Disp: , Rfl:     ARIPiprazole (ABILIFY) 2 mg tablet, TAKE 1 TABLET BY MOUTH DAILY, Disp: 30 tablet, Rfl: 11    busPIRone (BUSPAR) 10 mg tablet, Take 10 mg by mouth 2 (two) times a day, Disp: , Rfl:     escitalopram (LEXAPRO) 20 mg tablet, Take 1 tablet (20 mg total) by mouth daily (Patient not taking: Reported on 9/23/2024), Disp: 90 tablet, Rfl: 3    LORazepam (ATIVAN) 0.5 mg tablet, Take 1 tablet (0.5 mg total) by mouth in the morning, Disp: 60 tablet, Rfl: 0    mometasone (ELOCON) 0.1 % cream, Apply to affected areas below the neck once a day as needed. (Patient not taking: Reported on 9/23/2024), Disp: 45 g, Rfl: 0       Please note:  Voice-recognition software may have been used in the preparation of this document.  Occasional wrong word or \"sound-alike\" substitutions may have occurred due to the inherent limitations of voice recognition software.  Interpretation should be guided by context.       PREETI William  01/31/25    "

## 2025-01-31 NOTE — ASSESSMENT & PLAN NOTE
Recent [place] 7/12/2023 12 out of 30  Mood stable on current regimen  Continue Abilify 2 mg daily  Continue BuSpar 10 mg bid  Continue citalopram 20 mg daily   Continue lorazepam 0.5 mg daily    Provide redirection, reorientation, distraction techniques  Fall Precautions  Assist with ADLs/IADLs  Avoid deliriogenic medications such as tramadol, benzodiazepines, anticholinergics, benadryl  Encourage Hydration/ Nutrition  Implement sleep hygiene, limit night time interuptions   Encourage participation in group activities when appropriate

## 2025-03-18 DIAGNOSIS — F41.9 ANXIETY: ICD-10-CM

## 2025-03-18 RX ORDER — LORAZEPAM 0.5 MG/1
0.5 TABLET ORAL DAILY
Qty: 30 TABLET | Refills: 0 | Status: SHIPPED | OUTPATIENT
Start: 2025-03-18

## 2025-03-27 ENCOUNTER — NURSING HOME VISIT (OUTPATIENT)
Dept: GERIATRICS | Facility: OTHER | Age: OVER 89
End: 2025-03-27
Payer: MEDICARE

## 2025-03-27 DIAGNOSIS — F01.B2 MODERATE VASCULAR DEMENTIA WITH PSYCHOTIC DISTURBANCE (HCC): ICD-10-CM

## 2025-03-27 DIAGNOSIS — M21.371 RIGHT FOOT DROP: ICD-10-CM

## 2025-03-27 DIAGNOSIS — M19.011 PRIMARY OSTEOARTHRITIS OF BOTH SHOULDERS: ICD-10-CM

## 2025-03-27 DIAGNOSIS — M19.012 PRIMARY OSTEOARTHRITIS OF BOTH SHOULDERS: ICD-10-CM

## 2025-03-27 DIAGNOSIS — F41.8 ANXIETY WITH DEPRESSION: Primary | ICD-10-CM

## 2025-03-27 DIAGNOSIS — G56.03 BILATERAL CARPAL TUNNEL SYNDROME: ICD-10-CM

## 2025-03-27 PROCEDURE — 99349 HOME/RES VST EST MOD MDM 40: CPT | Performed by: FAMILY MEDICINE

## 2025-03-27 NOTE — PROGRESS NOTES
[facility]  [address] [address] [phone]  [facility]  POS [address]      NAME: Adilson Lopez  AGE: 89 y.o. SEX: male [phone]    DATE OF ENCOUNTER: 4/2/2025    Assessment and Plan     1. Anxiety with depression (Primary)  - stable  - cont Buspirone 10 mg po bid  - cont Citalopram 20 mg po qd  - cont Lorazepam 0.5 mg po qd    2. Bilateral carpal tunnel syndrome  - still c/o pain b/l wrists  - cont to wear braces  - cont Aspercreme topical as needed    3. 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 Primary osteoarthritis of both shoulders  - stable  - cont Tylenol 975 mg po q8 prn    5. Right foot drop  - stable  - Fall precautions in place       - Counseling Documentation: patient was counseled regarding: risk factor reductions and prognosis    Chief Complaint     Routine Long term follow-up visit.    History of Present Illness     HPI  Adilson Lopez, a 89 y/o male is a long term resident at [facility], seen and examined, stable. He is in the dining room, setting up the tables (placing spoons and forks) for dinner. He is c/o pain b/l wrists. He is eating and sleeping okay. He walks independent, doesnot use any assistive devices.  Staff have no concerns at this time.     The following portions of the patient's history were reviewed and updated as appropriate: allergies, current medications, past family history, past medical history, past social history, past surgical history and problem list.    Review of Systems     Review of Systems   Unable to perform ROS: Dementia   As in HPI.    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   Constitutional:       General: He is not in acute distress.     Appearance: Normal appearance. He is well-developed. He is not diaphoretic.   HENT:      Head: Normocephalic and atraumatic.      Nose: Nose normal.      Mouth/Throat:      Mouth: Mucous membranes are moist.      Pharynx: Oropharynx is clear. No oropharyngeal exudate.   Eyes:      General: No scleral icterus.        Right eye: No discharge.         Left eye: No discharge.      Extraocular Movements: Extraocular movements intact.      Conjunctiva/sclera: Conjunctivae normal.      Pupils: Pupils are equal, round, and reactive to light.   Cardiovascular:      Rate and Rhythm: Normal rate and regular rhythm.      Heart sounds: Normal heart sounds. No murmur heard.  Pulmonary:      Effort: Pulmonary effort is normal. No respiratory distress.      Breath sounds: Normal breath sounds. No wheezing.   Chest:      Chest wall: No tenderness.   Abdominal:      General: Bowel sounds are normal. There is no distension.      Palpations: Abdomen is soft.      Tenderness: There is no abdominal tenderness. There is no guarding.   Musculoskeletal:         General: No tenderness or deformity. Normal range of motion.      Right lower leg: No edema.      Left lower leg: No edema.   Skin:     General: Skin is warm and dry.   Neurological:      Mental Status: He is alert.      Cranial Nerves: No cranial nerve deficit.      Motor: No abnormal muscle tone.      Coordination: Coordination normal.      Comments: Oriented to person and place  Verbal  Able to follow  commands   Psychiatric:         Mood and Affect: Mood normal.         Behavior: Behavior normal.         Pertinent Laboratory/Diagnostic Studies:  Refer to facility chart.    Current Medications   Medications reviewed and updated in facility chart.

## 2025-04-18 DIAGNOSIS — F41.9 ANXIETY: ICD-10-CM

## 2025-04-18 RX ORDER — LORAZEPAM 0.5 MG/1
0.5 TABLET ORAL DAILY
Qty: 30 TABLET | Refills: 1 | Status: SHIPPED | OUTPATIENT
Start: 2025-04-18

## 2025-04-21 RX ORDER — CITALOPRAM HYDROBROMIDE 20 MG/1
20 TABLET ORAL DAILY
Qty: 30 TABLET | Refills: 11 | OUTPATIENT
Start: 2025-04-21

## 2025-05-18 DIAGNOSIS — R46.89 BEHAVIORAL CHANGE: ICD-10-CM

## 2025-05-21 RX ORDER — ARIPIPRAZOLE 2 MG/1
2 TABLET ORAL DAILY
Qty: 30 TABLET | Refills: 11 | Status: SHIPPED | OUTPATIENT
Start: 2025-05-21

## 2025-05-23 ENCOUNTER — NURSING HOME VISIT (OUTPATIENT)
Dept: GERIATRICS | Facility: OTHER | Age: OVER 89
End: 2025-05-23
Payer: MEDICARE

## 2025-05-23 DIAGNOSIS — F01.B2 MODERATE VASCULAR DEMENTIA WITH PSYCHOTIC DISTURBANCE (HCC): ICD-10-CM

## 2025-05-23 DIAGNOSIS — M19.011 PRIMARY OSTEOARTHRITIS OF BOTH SHOULDERS: ICD-10-CM

## 2025-05-23 DIAGNOSIS — R54 FRAILTY: ICD-10-CM

## 2025-05-23 DIAGNOSIS — M19.012 PRIMARY OSTEOARTHRITIS OF BOTH SHOULDERS: ICD-10-CM

## 2025-05-23 DIAGNOSIS — K59.01 SLOW TRANSIT CONSTIPATION: Primary | ICD-10-CM

## 2025-05-23 PROCEDURE — 99349 HOME/RES VST EST MOD MDM 40: CPT | Performed by: NURSE PRACTITIONER

## 2025-05-27 NOTE — PROGRESS NOTES
[facility]  [address] [address]  [phone]  [facility]  Code 13        NAME: Adilson Lopez  AGE: 89 y.o. SEX: male CODE STATUS: CPR    DATE OF ENCOUNTER: 5/23/2025    Assessment and Plan     1. Slow transit constipation  Assessment & Plan:  Patient denies any constipation on exam  Continue Colace daily  Encourage nutrition/hydration  2. 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 Primary osteoarthritis of both shoulders  Assessment & Plan:  Denies pain on exam  Continue Tylenol PRN   Can use Lidocaine 4% cream PRN   4. Frailty  Assessment & Plan:  In the setting of advanced age and Alzheimer's dementia  Continue restorative PT  Encourage nutrition/hydration  Fall precautions             All medications and routine orders were reviewed and updated as needed.    Chief Complaint     LTC follow up visit     History of Present Illness     Adilson Lopez is a 89-year-old male, he is a LTC resident of [facility] [place], [place]. Past Medical Hx including but not limited to Dementia Anxiety/Depression, Ambulatory dysfuncion, Insomnia  seen in collaboration with nursing for medical mgmt and LTC follow up.     Seen and examined at [facility]. Patient is seen in health center of [facility]. Staff state he enjoys helping set up for meals. Patient is pleasant and cooperative. Patient states he limps because his one leg is shorter than the  other and he states his b/l wrists still hurt at times, not sure if splints help. Staff states he sometimes does not like to wear splints, otherwise he offers no other complaints on exam. Staff have no concerns. Staff state patient is eating and drinking well.  Patient continues to ambulate without assistive device. Per staff/patient - No bowel or bladder concerns.          The patient's allergies, past medical, surgical, social and family history were reviewed and unchanged.    Review of Systems     Review of Systems   Unable to perform ROS: Dementia         Objective     Vitals:   There were no vitals filed for this visit.          Physical Exam  Vitals and nursing note reviewed.   Constitutional:       General: He is not in acute distress.     Appearance: Normal appearance.   HENT:      Head: Normocephalic and atraumatic.      Nose: No congestion or rhinorrhea.      Mouth/Throat:      Mouth: Mucous membranes are moist.     Eyes:      General: No scleral icterus.     Extraocular Movements: Extraocular movements intact.      Conjunctiva/sclera: Conjunctivae normal.      Pupils: Pupils are equal, round, and reactive to light.       Cardiovascular:      Rate and Rhythm: Normal rate and regular rhythm.      Pulses: Normal pulses.      Heart sounds: Normal heart sounds. No murmur heard.  Pulmonary:      Effort: Pulmonary effort is normal.      Breath sounds: Normal breath sounds. No wheezing, rhonchi or rales.   Abdominal:      General: Bowel sounds are normal. There is no distension.      Palpations: Abdomen is soft.      Tenderness: There is no abdominal tenderness.     Musculoskeletal:         General: No swelling or signs of injury.      Comments: Right foot drop  Bilateral hand weakness   Lymphadenopathy:      Cervical: No cervical adenopathy.     Skin:     General: Skin is warm and dry.      Findings: No erythema.     Neurological:      Mental Status: He is alert. Mental status is at baseline. He is disoriented.  "     Sensory: No sensory deficit.      Motor: Weakness present.      Gait: Gait abnormal.      Comments: AAOx person and place - clear speech. Forgetful.    Psychiatric:         Mood and Affect: Mood normal.         Behavior: Behavior normal.         Pertinent Laboratory/Diagnostic Studies:  Reviewed in facility chart-stable     Current Medications   Medications reviewed and updated see facility MAR for details.      Current Outpatient Medications:     Acetaminophen 325 MG CAPS, Take 975 mg by mouth every 8 (eight) hours as needed (pain), Disp: , Rfl:     ARIPiprazole (ABILIFY) 2 mg tablet, TAKE 1 TABLET BY MOUTH DAILY, Disp: 30 tablet, Rfl: 11    busPIRone (BUSPAR) 10 mg tablet, Take 10 mg by mouth 2 (two) times a day, Disp: , Rfl:     escitalopram (LEXAPRO) 20 mg tablet, Take 1 tablet (20 mg total) by mouth daily (Patient not taking: Reported on 9/23/2024), Disp: 90 tablet, Rfl: 3    LORazepam (ATIVAN) 0.5 mg tablet, Take 1 tablet (0.5 mg total) by mouth in the morning, Disp: 30 tablet, Rfl: 1    mometasone (ELOCON) 0.1 % cream, Apply to affected areas below the neck once a day as needed. (Patient not taking: Reported on 9/23/2024), Disp: 45 g, Rfl: 0       Please note:  Voice-recognition software may have been used in the preparation of this document.  Occasional wrong word or \"sound-alike\" substitutions may have occurred due to the inherent limitations of voice recognition software.  Interpretation should be guided by context.       PREETI William    "

## 2025-05-29 NOTE — ASSESSMENT & PLAN NOTE
Recent [place] 7/12/2023 12 out of 30  Mood stable on current regimen  Continue Abilify 2 mg daily  Continue BuSpar 10 mg bid  Continue citalopram 20 mg daily   Continue lorazepam 0.5 mg daily    Provide redirection, reorientation, distraction techniques  Fall Precautions  Assist with ADLs/IADLs  Avoid deliriogenic medications such as tramadol, benzodiazepines, anticholinergics, benadryl  Encourage Hydration/ Nutrition  Implement sleep hygiene, limit night time interuptions   Encourage participation in group activities when appropriate

## 2025-06-23 DIAGNOSIS — F41.9 ANXIETY: ICD-10-CM

## 2025-06-24 RX ORDER — LORAZEPAM 0.5 MG/1
0.5 TABLET ORAL DAILY
Qty: 30 TABLET | Refills: 0 | Status: SHIPPED | OUTPATIENT
Start: 2025-06-24

## 2025-07-16 DIAGNOSIS — F41.9 ANXIETY: ICD-10-CM

## 2025-07-16 RX ORDER — LORAZEPAM 0.5 MG/1
0.5 TABLET ORAL DAILY
Qty: 30 TABLET | Refills: 0 | Status: SHIPPED | OUTPATIENT
Start: 2025-07-16

## 2025-07-17 ENCOUNTER — NURSING HOME VISIT (OUTPATIENT)
Dept: GERIATRICS | Facility: OTHER | Age: OVER 89
End: 2025-07-17
Payer: MEDICARE

## 2025-07-17 DIAGNOSIS — F41.8 ANXIETY WITH DEPRESSION: ICD-10-CM

## 2025-07-17 DIAGNOSIS — M21.371 RIGHT FOOT DROP: ICD-10-CM

## 2025-07-17 DIAGNOSIS — G56.03 BILATERAL CARPAL TUNNEL SYNDROME: Primary | ICD-10-CM

## 2025-07-17 DIAGNOSIS — F01.B2 MODERATE VASCULAR DEMENTIA WITH PSYCHOTIC DISTURBANCE (HCC): ICD-10-CM

## 2025-07-17 DIAGNOSIS — M19.012 PRIMARY OSTEOARTHRITIS OF BOTH SHOULDERS: ICD-10-CM

## 2025-07-17 DIAGNOSIS — M19.011 PRIMARY OSTEOARTHRITIS OF BOTH SHOULDERS: ICD-10-CM

## 2025-07-17 PROCEDURE — 99349 HOME/RES VST EST MOD MDM 40: CPT | Performed by: FAMILY MEDICINE

## 2025-07-17 NOTE — PROGRESS NOTES
[facility]  [address] [address]  [phone]  [facility]  POS [address]      NAME: Adilson Lopez  AGE: 89 y.o. 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 Bilateral carpal tunnel syndrome  - still c/o pain b/l wrists  - cont to wear braces  - cont Aspercreme lidocaine 4% topical as needed  - Continue Tylenol 975 mg p.o. every 6 as needed     2. 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 Primary osteoarthritis of both shoulders  - stable  - cont Tylenol 975 mg po q6 prn     4. Right foot drop  - stable  - Fall precautions in place      5.. Anxiety with depression   - stable  - cont Buspirone 10 mg po bid  - cont Citalopram 20 mg po qd  - cont Lorazepam 0.5 mg po qd      - Counseling Documentation: patient was counseled regarding: risk factor reductions and prognosis    Chief Complaint     Routine Long term follow-up visit.    History of Present Illness     HPI  Adilson Lopez, a 89 y/o male is a long-term resident at [facility], seen and examined, stable.  He found walking in the kitchen and trying to set up the tables, which is his usual he looks a little upset.  Staff said other residents are messing up his table set up.  He offers no complaints at this time other than chronic pain in hands.  Staff have no concerns at this time.  He needed min assistance with ADLs, does not use any assistive devices, denies any falls or hospitalizations.    The following portions of the patient's history were reviewed and updated as appropriate:  allergies, current medications, past family history, past medical history, past social history, past surgical history and problem list.    Review of Systems     Review of Systems   Unable to perform ROS: Dementia   As in HPI.     Active Problem List   Problem List[1]    Objective     Vitals:T: 98.4, P: 81, R: 16, BP: 120/56, 99% on RA, Wt: 126.4 lbs    Physical Exam  Vitals and nursing note reviewed.   Constitutional:       General: He is not in acute distress.     Appearance: Normal appearance. He is well-developed. He is not diaphoretic.   HENT:      Head: Normocephalic and atraumatic.      Nose: Nose normal.      Mouth/Throat:      Mouth: Mucous membranes are moist.      Pharynx: Oropharynx is clear. No oropharyngeal exudate.     Eyes:      General: No scleral icterus.        Right eye: No discharge.         Left eye: No discharge.      Extraocular Movements: Extraocular movements intact.      Conjunctiva/sclera: Conjunctivae normal.       Cardiovascular:      Rate and Rhythm: Normal rate and regular rhythm.      Heart sounds: Normal heart sounds. No murmur heard.  Pulmonary:      Effort: Pulmonary effort is normal. No respiratory distress.      Breath sounds: Normal breath sounds. No wheezing.   Chest:      Chest wall: No tenderness.   Abdominal:      General: Bowel sounds are normal. There is no distension.      Palpations: Abdomen is soft.      Tenderness: There is no abdominal tenderness. There is no guarding.     Musculoskeletal:         General: No tenderness or deformity.      Cervical back: Normal range of motion.      Right lower leg: No edema.      Left lower leg: No edema.      Comments: Impaired ROM in b/l hands     Skin:     General: Skin is warm and dry.     Neurological:      Mental Status: He is alert.      Cranial Nerves: No cranial nerve deficit.      Motor: No abnormal muscle tone.      Coordination: Coordination normal.      Comments: Oriented to self  Verbal  Able to follow simple commands    Psychiatric:         Mood and Affect: Mood normal.         Behavior: Behavior normal.      Comments: Intermittent confusion/forgetful         Pertinent Laboratory/Diagnostic Studies:  Refer to facility chart.    Current Medications   Medications reviewed and updated in facility chart.            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

## 2025-08-08 DIAGNOSIS — F41.9 ANXIETY: ICD-10-CM

## 2025-08-08 RX ORDER — LORAZEPAM 0.5 MG/1
0.5 TABLET ORAL DAILY
Qty: 30 TABLET | Refills: 0 | Status: SHIPPED | OUTPATIENT
Start: 2025-08-13